# Patient Record
Sex: MALE | Race: WHITE | Employment: OTHER | URBAN - METROPOLITAN AREA
[De-identification: names, ages, dates, MRNs, and addresses within clinical notes are randomized per-mention and may not be internally consistent; named-entity substitution may affect disease eponyms.]

---

## 2020-01-01 ENCOUNTER — APPOINTMENT (OUTPATIENT)
Dept: GENERAL RADIOLOGY | Age: 70
DRG: 207 | End: 2020-01-01
Attending: INTERNAL MEDICINE
Payer: MEDICARE

## 2020-01-01 ENCOUNTER — APPOINTMENT (OUTPATIENT)
Dept: ULTRASOUND IMAGING | Age: 70
DRG: 207 | End: 2020-01-01
Attending: INTERNAL MEDICINE
Payer: MEDICARE

## 2020-01-01 ENCOUNTER — APPOINTMENT (OUTPATIENT)
Dept: CT IMAGING | Age: 70
End: 2020-01-01
Attending: EMERGENCY MEDICINE
Payer: COMMERCIAL

## 2020-01-01 ENCOUNTER — APPOINTMENT (OUTPATIENT)
Dept: GENERAL RADIOLOGY | Age: 70
DRG: 207 | End: 2020-01-01
Attending: EMERGENCY MEDICINE
Payer: MEDICARE

## 2020-01-01 ENCOUNTER — HOSPITAL ENCOUNTER (INPATIENT)
Age: 70
LOS: 13 days | DRG: 207 | End: 2020-12-15
Attending: EMERGENCY MEDICINE | Admitting: INTERNAL MEDICINE
Payer: MEDICARE

## 2020-01-01 ENCOUNTER — HOSPITAL ENCOUNTER (EMERGENCY)
Age: 70
Discharge: HOME | End: 2020-11-27
Attending: EMERGENCY MEDICINE
Payer: COMMERCIAL

## 2020-01-01 VITALS
TEMPERATURE: 99.6 F | BODY MASS INDEX: 29.12 KG/M2 | DIASTOLIC BLOOD PRESSURE: 81 MMHG | HEIGHT: 72 IN | SYSTOLIC BLOOD PRESSURE: 154 MMHG | OXYGEN SATURATION: 94 % | RESPIRATION RATE: 18 BRPM | HEART RATE: 78 BPM | WEIGHT: 215 LBS

## 2020-01-01 VITALS
OXYGEN SATURATION: 23 % | TEMPERATURE: 102.1 F | DIASTOLIC BLOOD PRESSURE: 10 MMHG | BODY MASS INDEX: 29.5 KG/M2 | SYSTOLIC BLOOD PRESSURE: 26 MMHG | WEIGHT: 217.81 LBS | RESPIRATION RATE: 125 BRPM | HEIGHT: 72 IN

## 2020-01-01 DIAGNOSIS — U07.1 ACUTE RESPIRATORY DISTRESS SYNDROME (ARDS) DUE TO COVID-19 VIRUS (HCC): ICD-10-CM

## 2020-01-01 DIAGNOSIS — E87.0 HYPERNATREMIA: ICD-10-CM

## 2020-01-01 DIAGNOSIS — J98.2 PNEUMOMEDIASTINUM (HCC): ICD-10-CM

## 2020-01-01 DIAGNOSIS — N17.9 AKI (ACUTE KIDNEY INJURY) (HCC): ICD-10-CM

## 2020-01-01 DIAGNOSIS — J96.01 ACUTE HYPOXEMIC RESPIRATORY FAILURE DUE TO COVID-19 (HCC): ICD-10-CM

## 2020-01-01 DIAGNOSIS — D69.6 THROMBOCYTOPENIA (HCC): ICD-10-CM

## 2020-01-01 DIAGNOSIS — U07.1 COVID-19: ICD-10-CM

## 2020-01-01 DIAGNOSIS — R91.8 MASS OF LEFT LUNG: ICD-10-CM

## 2020-01-01 DIAGNOSIS — T79.7XXD SUBCUTANEOUS EMPHYSEMA, SUBSEQUENT ENCOUNTER: ICD-10-CM

## 2020-01-01 DIAGNOSIS — J80 ACUTE RESPIRATORY DISTRESS SYNDROME (ARDS) DUE TO COVID-19 VIRUS (HCC): ICD-10-CM

## 2020-01-01 DIAGNOSIS — J93.9 PNEUMOTHORAX ON LEFT: ICD-10-CM

## 2020-01-01 DIAGNOSIS — J12.82 PNEUMONIA DUE TO COVID-19 VIRUS: ICD-10-CM

## 2020-01-01 DIAGNOSIS — R26.9 ABNORMAL GAIT: Primary | ICD-10-CM

## 2020-01-01 DIAGNOSIS — U07.1 PNEUMONIA DUE TO COVID-19 VIRUS: ICD-10-CM

## 2020-01-01 DIAGNOSIS — R41.0 DELIRIUM: ICD-10-CM

## 2020-01-01 DIAGNOSIS — J96.01 ACUTE RESPIRATORY FAILURE WITH HYPOXIA (HCC): Primary | ICD-10-CM

## 2020-01-01 DIAGNOSIS — J93.9 PNEUMOTHORAX ON RIGHT: ICD-10-CM

## 2020-01-01 DIAGNOSIS — J90 PLEURAL EFFUSION, LEFT: ICD-10-CM

## 2020-01-01 DIAGNOSIS — U07.1 ACUTE HYPOXEMIC RESPIRATORY FAILURE DUE TO COVID-19 (HCC): ICD-10-CM

## 2020-01-01 LAB
ABO + RH BLD: NORMAL
ALBUMIN SERPL-MCNC: 1.3 G/DL (ref 3.2–4.6)
ALBUMIN SERPL-MCNC: 1.6 G/DL (ref 3.2–4.6)
ALBUMIN SERPL-MCNC: 2 G/DL (ref 3.2–4.6)
ALBUMIN SERPL-MCNC: 2.8 G/DL (ref 3.2–4.6)
ALBUMIN SERPL-MCNC: 3.6 G/DL (ref 3.2–4.6)
ALBUMIN/GLOB SERPL: 0.4 {RATIO} (ref 1.2–3.5)
ALBUMIN/GLOB SERPL: 0.7 {RATIO} (ref 1.2–3.5)
ALBUMIN/GLOB SERPL: 0.9 {RATIO} (ref 1.2–3.5)
ALP SERPL-CCNC: 57 U/L (ref 50–136)
ALP SERPL-CCNC: 61 U/L (ref 50–136)
ALP SERPL-CCNC: 77 U/L (ref 50–136)
ALT SERPL-CCNC: 19 U/L (ref 12–65)
ALT SERPL-CCNC: 28 U/L (ref 12–65)
ALT SERPL-CCNC: 30 U/L (ref 12–65)
AMORPH CRY URNS QL MICRO: ABNORMAL
ANION GAP SERPL CALC-SCNC: 11 MMOL/L (ref 7–16)
ANION GAP SERPL CALC-SCNC: 2 MMOL/L (ref 7–16)
ANION GAP SERPL CALC-SCNC: 5 MMOL/L (ref 7–16)
ANION GAP SERPL CALC-SCNC: 6 MMOL/L (ref 7–16)
ANION GAP SERPL CALC-SCNC: 7 MMOL/L (ref 7–16)
ANION GAP SERPL CALC-SCNC: 8 MMOL/L (ref 7–16)
ANION GAP SERPL CALC-SCNC: 9 MMOL/L (ref 7–16)
APPEARANCE UR: ABNORMAL
APTT PPP: 32.7 SEC (ref 24.1–35.1)
APTT PPP: 59.2 SEC (ref 24.1–35.1)
APTT PPP: 64.6 SEC (ref 24.1–35.1)
APTT PPP: 65.9 SEC (ref 24.1–35.1)
APTT PPP: 69.5 SEC (ref 24.1–35.1)
APTT PPP: 77.8 SEC (ref 24.1–35.1)
ARTERIAL PATENCY WRIST A: ABNORMAL
ARTERIAL PATENCY WRIST A: YES
AST SERPL-CCNC: 23 U/L (ref 15–37)
AST SERPL-CCNC: 53 U/L (ref 15–37)
AST SERPL-CCNC: 75 U/L (ref 15–37)
ATRIAL RATE: 66 BPM
BACTERIA SPEC CULT: NORMAL
BACTERIA SPEC CULT: NORMAL
BACTERIA URNS QL MICRO: 0 /HPF
BACTERIA URNS QL MICRO: ABNORMAL /HPF
BASE DEFICIT BLD-SCNC: 1 MMOL/L
BASE DEFICIT BLD-SCNC: 2 MMOL/L
BASE DEFICIT BLD-SCNC: 3 MMOL/L
BASE DEFICIT BLD-SCNC: 4 MMOL/L
BASE DEFICIT BLD-SCNC: 4 MMOL/L
BASE DEFICIT BLD-SCNC: 6 MMOL/L
BASE DEFICIT BLD-SCNC: 7 MMOL/L
BASE DEFICIT BLD-SCNC: 8 MMOL/L
BASE DEFICIT BLD-SCNC: 8 MMOL/L
BASE EXCESS BLD CALC-SCNC: 1 MMOL/L
BASOPHILS # BLD: 0 K/UL (ref 0–0.2)
BASOPHILS NFR BLD: 0 % (ref 0–2)
BASOPHILS NFR BLD: 1 % (ref 0–2)
BDY SITE: ABNORMAL
BILIRUB DIRECT SERPL-MCNC: 0.4 MG/DL
BILIRUB SERPL-MCNC: 0.5 MG/DL (ref 0.2–1.1)
BILIRUB SERPL-MCNC: 0.6 MG/DL (ref 0.2–1.1)
BILIRUB SERPL-MCNC: 0.6 MG/DL (ref 0.2–1.1)
BILIRUB UR QL: NEGATIVE
BLD PROD TYP BPU: NORMAL
BLOOD GROUP ANTIBODIES SERPL: NORMAL
BNP SERPL-MCNC: 525 PG/ML (ref 5–125)
BPU ID: NORMAL
BUN SERPL-MCNC: 110 MG/DL (ref 8–23)
BUN SERPL-MCNC: 120 MG/DL (ref 8–23)
BUN SERPL-MCNC: 142 MG/DL (ref 8–23)
BUN SERPL-MCNC: 24 MG/DL (ref 8–23)
BUN SERPL-MCNC: 28 MG/DL (ref 8–23)
BUN SERPL-MCNC: 28 MG/DL (ref 8–23)
BUN SERPL-MCNC: 30 MG/DL (ref 8–23)
BUN SERPL-MCNC: 32 MG/DL (ref 8–23)
BUN SERPL-MCNC: 33 MG/DL (ref 8–23)
BUN SERPL-MCNC: 36 MG/DL (ref 8–23)
BUN SERPL-MCNC: 37 MG/DL (ref 8–23)
BUN SERPL-MCNC: 64 MG/DL (ref 8–23)
BUN SERPL-MCNC: 73 MG/DL (ref 8–23)
BUN SERPL-MCNC: 85 MG/DL (ref 8–23)
BUN SERPL-MCNC: 88 MG/DL (ref 8–23)
BUN SERPL-MCNC: 98 MG/DL (ref 8–23)
CALCIUM SERPL-MCNC: 6.1 MG/DL (ref 8.3–10.4)
CALCIUM SERPL-MCNC: 7.1 MG/DL (ref 8.3–10.4)
CALCIUM SERPL-MCNC: 7.7 MG/DL (ref 8.3–10.4)
CALCIUM SERPL-MCNC: 7.7 MG/DL (ref 8.3–10.4)
CALCIUM SERPL-MCNC: 7.8 MG/DL (ref 8.3–10.4)
CALCIUM SERPL-MCNC: 7.9 MG/DL (ref 8.3–10.4)
CALCIUM SERPL-MCNC: 8 MG/DL (ref 8.3–10.4)
CALCIUM SERPL-MCNC: 8 MG/DL (ref 8.3–10.4)
CALCIUM SERPL-MCNC: 8.2 MG/DL (ref 8.3–10.4)
CALCIUM SERPL-MCNC: 8.2 MG/DL (ref 8.3–10.4)
CALCIUM SERPL-MCNC: 8.3 MG/DL (ref 8.3–10.4)
CALCIUM SERPL-MCNC: 8.4 MG/DL (ref 8.3–10.4)
CALCIUM SERPL-MCNC: 8.4 MG/DL (ref 8.3–10.4)
CALCIUM SERPL-MCNC: 8.6 MG/DL (ref 8.3–10.4)
CALCULATED P AXIS, ECG09: 57 DEGREES
CALCULATED R AXIS, ECG10: 53 DEGREES
CALCULATED T AXIS, ECG11: 58 DEGREES
CASTS URNS QL MICRO: ABNORMAL /LPF
CASTS URNS QL MICRO: NORMAL /LPF
CHLORIDE SERPL-SCNC: 100 MMOL/L (ref 98–107)
CHLORIDE SERPL-SCNC: 103 MMOL/L (ref 98–107)
CHLORIDE SERPL-SCNC: 105 MMOL/L (ref 98–107)
CHLORIDE SERPL-SCNC: 107 MMOL/L (ref 98–107)
CHLORIDE SERPL-SCNC: 111 MMOL/L (ref 98–107)
CHLORIDE SERPL-SCNC: 115 MMOL/L (ref 98–107)
CHLORIDE SERPL-SCNC: 117 MMOL/L (ref 98–107)
CHLORIDE SERPL-SCNC: 120 MMOL/L (ref 98–107)
CHLORIDE SERPL-SCNC: 122 MMOL/L (ref 98–107)
CHLORIDE SERPL-SCNC: 123 MMOL/L (ref 98–107)
CHLORIDE SERPL-SCNC: 123 MMOL/L (ref 98–107)
CHLORIDE SERPL-SCNC: 125 MMOL/L (ref 98–107)
CHLORIDE SERPL-SCNC: 126 MMOL/L (ref 98–107)
CHLORIDE SERPL-SCNC: 97 MMOL/L (ref 98–107)
CO2 BLD-SCNC: 20 MMOL/L
CO2 BLD-SCNC: 20 MMOL/L
CO2 BLD-SCNC: 21 MMOL/L
CO2 BLD-SCNC: 22 MMOL/L
CO2 BLD-SCNC: 23 MMOL/L
CO2 BLD-SCNC: 24 MMOL/L
CO2 BLD-SCNC: 24 MMOL/L
CO2 BLD-SCNC: 25 MMOL/L
CO2 BLD-SCNC: 26 MMOL/L
CO2 BLD-SCNC: 27 MMOL/L
CO2 BLD-SCNC: 27 MMOL/L
CO2 BLD-SCNC: 29 MMOL/L
CO2 BLD-SCNC: 29 MMOL/L
CO2 BLD-SCNC: 30 MMOL/L
CO2 BLD-SCNC: 31 MMOL/L
CO2 BLD-SCNC: 32 MMOL/L
CO2 BLD-SCNC: 32 MMOL/L
CO2 SERPL-SCNC: 20 MMOL/L (ref 21–32)
CO2 SERPL-SCNC: 21 MMOL/L (ref 21–32)
CO2 SERPL-SCNC: 23 MMOL/L (ref 21–32)
CO2 SERPL-SCNC: 23 MMOL/L (ref 21–32)
CO2 SERPL-SCNC: 24 MMOL/L (ref 21–32)
CO2 SERPL-SCNC: 25 MMOL/L (ref 21–32)
CO2 SERPL-SCNC: 28 MMOL/L (ref 21–32)
CO2 SERPL-SCNC: 29 MMOL/L (ref 21–32)
CO2 SERPL-SCNC: 29 MMOL/L (ref 21–32)
CO2 SERPL-SCNC: 31 MMOL/L (ref 21–32)
COLLECT TIME,HTIME: 1045
COLLECT TIME,HTIME: 1045
COLLECT TIME,HTIME: 1109
COLLECT TIME,HTIME: 1203
COLLECT TIME,HTIME: 1210
COLLECT TIME,HTIME: 140
COLLECT TIME,HTIME: 145
COLLECT TIME,HTIME: 1458
COLLECT TIME,HTIME: 200
COLLECT TIME,HTIME: 2004
COLLECT TIME,HTIME: 210
COLLECT TIME,HTIME: 233
COLLECT TIME,HTIME: 250
COLLECT TIME,HTIME: 250
COLLECT TIME,HTIME: 305
COLLECT TIME,HTIME: 306
COLLECT TIME,HTIME: 320
COLLECT TIME,HTIME: 340
COLLECT TIME,HTIME: 345
COLLECT TIME,HTIME: 400
COLLECT TIME,HTIME: 400
COLLECT TIME,HTIME: 405
COLLECT TIME,HTIME: 410
COLOR UR: YELLOW
COVID-19 RAPID TEST, COVR: DETECTED
CREAT SERPL-MCNC: 0.78 MG/DL (ref 0.8–1.5)
CREAT SERPL-MCNC: 0.93 MG/DL (ref 0.8–1.5)
CREAT SERPL-MCNC: 0.94 MG/DL (ref 0.8–1.5)
CREAT SERPL-MCNC: 0.94 MG/DL (ref 0.8–1.5)
CREAT SERPL-MCNC: 0.97 MG/DL (ref 0.8–1.5)
CREAT SERPL-MCNC: 1.03 MG/DL (ref 0.8–1.5)
CREAT SERPL-MCNC: 1.39 MG/DL (ref 0.8–1.5)
CREAT SERPL-MCNC: 2.23 MG/DL (ref 0.8–1.5)
CREAT SERPL-MCNC: 2.45 MG/DL (ref 0.8–1.5)
CREAT SERPL-MCNC: 2.49 MG/DL (ref 0.8–1.5)
CREAT SERPL-MCNC: 2.73 MG/DL (ref 0.8–1.5)
CREAT SERPL-MCNC: 3.06 MG/DL (ref 0.8–1.5)
CREAT SERPL-MCNC: 3.64 MG/DL (ref 0.8–1.5)
CREAT SERPL-MCNC: 3.7 MG/DL (ref 0.8–1.5)
CREAT SERPL-MCNC: 3.99 MG/DL (ref 0.8–1.5)
CREAT SERPL-MCNC: 4.41 MG/DL (ref 0.8–1.5)
CREAT UR-MCNC: 66 MG/DL
D DIMER PPP FEU-MCNC: 6.14 UG/ML(FEU)
DIAGNOSIS, 93000: NORMAL
DIFFERENTIAL METHOD BLD: ABNORMAL
EOSINOPHIL # BLD: 0 K/UL (ref 0–0.8)
EOSINOPHIL # BLD: 0.2 K/UL (ref 0–0.8)
EOSINOPHIL NFR BLD: 0 % (ref 0.5–7.8)
EOSINOPHIL NFR BLD: 1 % (ref 0.5–7.8)
EPI CELLS #/AREA URNS HPF: ABNORMAL /HPF
EPI CELLS #/AREA URNS HPF: NORMAL /HPF
ERYTHROCYTE [DISTWIDTH] IN BLOOD BY AUTOMATED COUNT: 14 % (ref 11.9–14.6)
ERYTHROCYTE [DISTWIDTH] IN BLOOD BY AUTOMATED COUNT: 14.3 % (ref 11.9–14.6)
ERYTHROCYTE [DISTWIDTH] IN BLOOD BY AUTOMATED COUNT: 14.4 % (ref 11.9–14.6)
ERYTHROCYTE [DISTWIDTH] IN BLOOD BY AUTOMATED COUNT: 14.6 % (ref 11.9–14.6)
ERYTHROCYTE [DISTWIDTH] IN BLOOD BY AUTOMATED COUNT: 14.6 % (ref 11.9–14.6)
ERYTHROCYTE [DISTWIDTH] IN BLOOD BY AUTOMATED COUNT: 15.3 % (ref 11.9–14.6)
ERYTHROCYTE [DISTWIDTH] IN BLOOD BY AUTOMATED COUNT: 15.5 % (ref 11.9–14.6)
ERYTHROCYTE [DISTWIDTH] IN BLOOD BY AUTOMATED COUNT: 15.5 % (ref 11.9–14.6)
ERYTHROCYTE [DISTWIDTH] IN BLOOD BY AUTOMATED COUNT: 15.6 % (ref 11.9–14.6)
ERYTHROCYTE [DISTWIDTH] IN BLOOD BY AUTOMATED COUNT: 15.8 % (ref 11.9–14.6)
ERYTHROCYTE [DISTWIDTH] IN BLOOD BY AUTOMATED COUNT: 17 % (ref 11.9–14.6)
ERYTHROCYTE [DISTWIDTH] IN BLOOD BY AUTOMATED COUNT: 17.3 % (ref 11.9–14.6)
ERYTHROCYTE [DISTWIDTH] IN BLOOD BY AUTOMATED COUNT: 17.5 % (ref 11.9–14.6)
ERYTHROCYTE [DISTWIDTH] IN BLOOD BY AUTOMATED COUNT: 17.5 % (ref 11.9–14.6)
EXHALED MINUTE VOLUME, VE: 12.8 L/MIN
EXHALED MINUTE VOLUME, VE: 12.9 L/MIN
EXHALED MINUTE VOLUME, VE: 13.6 L/MIN
EXHALED MINUTE VOLUME, VE: 14 L/MIN
EXHALED MINUTE VOLUME, VE: 15 L/MIN
EXHALED MINUTE VOLUME, VE: 15.5 L/MIN
EXHALED MINUTE VOLUME, VE: 15.7 L/MIN
EXHALED MINUTE VOLUME, VE: 16.3 L/MIN
EXHALED MINUTE VOLUME, VE: 16.5 L/MIN
EXHALED MINUTE VOLUME, VE: 17 L/MIN
EXHALED MINUTE VOLUME, VE: 17 L/MIN
EXHALED MINUTE VOLUME, VE: 17.3 L/MIN
EXHALED MINUTE VOLUME, VE: 17.7 L/MIN
EXHALED MINUTE VOLUME, VE: 20.8 L/MIN
EXHALED MINUTE VOLUME, VE: 21.2 L/MIN
EXHALED MINUTE VOLUME, VE: 28.2 L/MIN
EXHALED MINUTE VOLUME, VE: 30 L/MIN
EXHALED MINUTE VOLUME, VE: 35 L/MIN
FLOW RATE ISTAT,IFRATE: 15 L/MIN
GAS FLOW.O2 O2 DELIVERY SYS: ABNORMAL L/MIN
GAS FLOW.O2 SETTING OXYMISER: 12 BPM
GAS FLOW.O2 SETTING OXYMISER: 14 BPM
GAS FLOW.O2 SETTING OXYMISER: 24 BPM
GAS FLOW.O2 SETTING OXYMISER: 25 BPM
GAS FLOW.O2 SETTING OXYMISER: 26 BPM
GAS FLOW.O2 SETTING OXYMISER: 28 BPM
GAS FLOW.O2 SETTING OXYMISER: 30 BPM
GAS FLOW.O2 SETTING OXYMISER: 30 BPM
GAS FLOW.O2 SETTING OXYMISER: 31 BPM
GAS FLOW.O2 SETTING OXYMISER: 32 BPM
GLOBULIN SER CALC-MCNC: 4 G/DL (ref 2.3–3.5)
GLOBULIN SER CALC-MCNC: 4.2 G/DL (ref 2.3–3.5)
GLOBULIN SER CALC-MCNC: 4.4 G/DL (ref 2.3–3.5)
GLUCOSE BLD STRIP.AUTO-MCNC: 101 MG/DL (ref 65–100)
GLUCOSE BLD STRIP.AUTO-MCNC: 104 MG/DL (ref 65–100)
GLUCOSE BLD STRIP.AUTO-MCNC: 105 MG/DL (ref 65–100)
GLUCOSE BLD STRIP.AUTO-MCNC: 106 MG/DL (ref 65–100)
GLUCOSE BLD STRIP.AUTO-MCNC: 108 MG/DL (ref 65–100)
GLUCOSE BLD STRIP.AUTO-MCNC: 110 MG/DL (ref 65–100)
GLUCOSE BLD STRIP.AUTO-MCNC: 110 MG/DL (ref 65–100)
GLUCOSE BLD STRIP.AUTO-MCNC: 111 MG/DL (ref 65–100)
GLUCOSE BLD STRIP.AUTO-MCNC: 112 MG/DL (ref 65–100)
GLUCOSE BLD STRIP.AUTO-MCNC: 113 MG/DL (ref 65–100)
GLUCOSE BLD STRIP.AUTO-MCNC: 116 MG/DL (ref 65–100)
GLUCOSE BLD STRIP.AUTO-MCNC: 118 MG/DL (ref 65–100)
GLUCOSE BLD STRIP.AUTO-MCNC: 122 MG/DL (ref 65–100)
GLUCOSE BLD STRIP.AUTO-MCNC: 124 MG/DL (ref 65–100)
GLUCOSE BLD STRIP.AUTO-MCNC: 125 MG/DL (ref 65–100)
GLUCOSE BLD STRIP.AUTO-MCNC: 126 MG/DL (ref 65–100)
GLUCOSE BLD STRIP.AUTO-MCNC: 126 MG/DL (ref 65–100)
GLUCOSE BLD STRIP.AUTO-MCNC: 128 MG/DL (ref 65–100)
GLUCOSE BLD STRIP.AUTO-MCNC: 128 MG/DL (ref 65–100)
GLUCOSE BLD STRIP.AUTO-MCNC: 129 MG/DL (ref 65–100)
GLUCOSE BLD STRIP.AUTO-MCNC: 133 MG/DL (ref 65–100)
GLUCOSE BLD STRIP.AUTO-MCNC: 134 MG/DL (ref 65–100)
GLUCOSE BLD STRIP.AUTO-MCNC: 134 MG/DL (ref 65–100)
GLUCOSE BLD STRIP.AUTO-MCNC: 135 MG/DL (ref 65–100)
GLUCOSE BLD STRIP.AUTO-MCNC: 137 MG/DL (ref 65–100)
GLUCOSE BLD STRIP.AUTO-MCNC: 138 MG/DL (ref 65–100)
GLUCOSE BLD STRIP.AUTO-MCNC: 139 MG/DL (ref 65–100)
GLUCOSE BLD STRIP.AUTO-MCNC: 139 MG/DL (ref 65–100)
GLUCOSE BLD STRIP.AUTO-MCNC: 143 MG/DL (ref 65–100)
GLUCOSE BLD STRIP.AUTO-MCNC: 143 MG/DL (ref 65–100)
GLUCOSE BLD STRIP.AUTO-MCNC: 144 MG/DL (ref 65–100)
GLUCOSE BLD STRIP.AUTO-MCNC: 145 MG/DL (ref 65–100)
GLUCOSE BLD STRIP.AUTO-MCNC: 149 MG/DL (ref 65–100)
GLUCOSE BLD STRIP.AUTO-MCNC: 150 MG/DL (ref 65–100)
GLUCOSE BLD STRIP.AUTO-MCNC: 151 MG/DL (ref 65–100)
GLUCOSE BLD STRIP.AUTO-MCNC: 154 MG/DL (ref 65–100)
GLUCOSE BLD STRIP.AUTO-MCNC: 156 MG/DL (ref 65–100)
GLUCOSE BLD STRIP.AUTO-MCNC: 157 MG/DL (ref 65–100)
GLUCOSE BLD STRIP.AUTO-MCNC: 159 MG/DL (ref 65–100)
GLUCOSE BLD STRIP.AUTO-MCNC: 159 MG/DL (ref 65–100)
GLUCOSE BLD STRIP.AUTO-MCNC: 196 MG/DL (ref 65–100)
GLUCOSE BLD STRIP.AUTO-MCNC: 196 MG/DL (ref 65–100)
GLUCOSE BLD STRIP.AUTO-MCNC: 200 MG/DL (ref 65–100)
GLUCOSE BLD STRIP.AUTO-MCNC: 21 MG/DL (ref 65–100)
GLUCOSE BLD STRIP.AUTO-MCNC: 24 MG/DL (ref 65–100)
GLUCOSE BLD STRIP.AUTO-MCNC: 76 MG/DL (ref 65–100)
GLUCOSE BLD STRIP.AUTO-MCNC: 78 MG/DL (ref 65–100)
GLUCOSE BLD STRIP.AUTO-MCNC: 92 MG/DL (ref 65–100)
GLUCOSE BLD STRIP.AUTO-MCNC: 95 MG/DL (ref 65–100)
GLUCOSE BLD STRIP.AUTO-MCNC: 95 MG/DL (ref 65–100)
GLUCOSE BLD STRIP.AUTO-MCNC: 99 MG/DL (ref 65–100)
GLUCOSE SERPL-MCNC: 106 MG/DL (ref 65–100)
GLUCOSE SERPL-MCNC: 107 MG/DL (ref 65–100)
GLUCOSE SERPL-MCNC: 109 MG/DL (ref 65–100)
GLUCOSE SERPL-MCNC: 116 MG/DL (ref 65–100)
GLUCOSE SERPL-MCNC: 116 MG/DL (ref 65–100)
GLUCOSE SERPL-MCNC: 118 MG/DL (ref 65–100)
GLUCOSE SERPL-MCNC: 133 MG/DL (ref 65–100)
GLUCOSE SERPL-MCNC: 143 MG/DL (ref 65–100)
GLUCOSE SERPL-MCNC: 147 MG/DL (ref 65–100)
GLUCOSE SERPL-MCNC: 154 MG/DL (ref 65–100)
GLUCOSE SERPL-MCNC: 156 MG/DL (ref 65–100)
GLUCOSE SERPL-MCNC: 178 MG/DL (ref 65–100)
GLUCOSE SERPL-MCNC: 686 MG/DL (ref 65–100)
GLUCOSE SERPL-MCNC: 90 MG/DL (ref 65–100)
GLUCOSE SERPL-MCNC: 917 MG/DL (ref 65–100)
GLUCOSE SERPL-MCNC: 95 MG/DL (ref 65–100)
GLUCOSE UR STRIP.AUTO-MCNC: NEGATIVE MG/DL
HAV IGM SER QL: NONREACTIVE
HBV CORE IGM SER QL: NONREACTIVE
HBV SURFACE AG SER QL: NONREACTIVE
HCO3 BLD-SCNC: 18.5 MMOL/L (ref 22–26)
HCO3 BLD-SCNC: 18.6 MMOL/L (ref 22–26)
HCO3 BLD-SCNC: 20 MMOL/L (ref 22–26)
HCO3 BLD-SCNC: 20.9 MMOL/L (ref 22–26)
HCO3 BLD-SCNC: 21.4 MMOL/L (ref 22–26)
HCO3 BLD-SCNC: 21.6 MMOL/L (ref 22–26)
HCO3 BLD-SCNC: 21.7 MMOL/L (ref 22–26)
HCO3 BLD-SCNC: 22.3 MMOL/L (ref 22–26)
HCO3 BLD-SCNC: 22.4 MMOL/L (ref 22–26)
HCO3 BLD-SCNC: 22.5 MMOL/L (ref 22–26)
HCO3 BLD-SCNC: 23.4 MMOL/L (ref 22–26)
HCO3 BLD-SCNC: 23.5 MMOL/L (ref 22–26)
HCO3 BLD-SCNC: 23.6 MMOL/L (ref 22–26)
HCO3 BLD-SCNC: 24 MMOL/L (ref 22–26)
HCO3 BLD-SCNC: 24.4 MMOL/L (ref 22–26)
HCO3 BLD-SCNC: 25.2 MMOL/L (ref 22–26)
HCO3 BLD-SCNC: 25.5 MMOL/L (ref 22–26)
HCO3 BLD-SCNC: 26.7 MMOL/L (ref 22–26)
HCO3 BLD-SCNC: 27.3 MMOL/L (ref 22–26)
HCO3 BLD-SCNC: 27.4 MMOL/L (ref 22–26)
HCO3 BLD-SCNC: 29.2 MMOL/L (ref 22–26)
HCO3 BLD-SCNC: 29.2 MMOL/L (ref 22–26)
HCO3 BLD-SCNC: 29.6 MMOL/L (ref 22–26)
HCT VFR BLD AUTO: 22.5 % (ref 41.1–50.3)
HCT VFR BLD AUTO: 24.3 % (ref 41.1–50.3)
HCT VFR BLD AUTO: 26.4 % (ref 41.1–50.3)
HCT VFR BLD AUTO: 27.2 % (ref 41.1–50.3)
HCT VFR BLD AUTO: 29.9 % (ref 41.1–50.3)
HCT VFR BLD AUTO: 30.9 % (ref 41.1–50.3)
HCT VFR BLD AUTO: 33.4 % (ref 41.1–50.3)
HCT VFR BLD AUTO: 33.6 % (ref 41.1–50.3)
HCT VFR BLD AUTO: 34.1 % (ref 41.1–50.3)
HCT VFR BLD AUTO: 34.1 % (ref 41.1–50.3)
HCT VFR BLD AUTO: 34.4 % (ref 41.1–50.3)
HCT VFR BLD AUTO: 35.9 % (ref 41.1–50.3)
HCT VFR BLD AUTO: 39.1 % (ref 41.1–50.3)
HCT VFR BLD AUTO: 39.8 % (ref 41.1–50.3)
HCT VFR BLD AUTO: 41 % (ref 41.1–50.3)
HCT VFR BLD AUTO: 42.4 % (ref 41.1–50.3)
HCV AB SER QL: NONREACTIVE
HEMOCCULT STL QL: POSITIVE
HEPARIN INDUCED PLT,XHIPA: NEGATIVE
HGB BLD-MCNC: 10.5 G/DL (ref 13.6–17.2)
HGB BLD-MCNC: 10.6 G/DL (ref 13.6–17.2)
HGB BLD-MCNC: 10.8 G/DL (ref 13.6–17.2)
HGB BLD-MCNC: 10.9 G/DL (ref 13.6–17.2)
HGB BLD-MCNC: 11 G/DL (ref 13.6–17.2)
HGB BLD-MCNC: 11.4 G/DL (ref 13.6–17.2)
HGB BLD-MCNC: 12 G/DL (ref 13.6–17.2)
HGB BLD-MCNC: 12.6 G/DL (ref 13.6–17.2)
HGB BLD-MCNC: 13.1 G/DL (ref 13.6–17.2)
HGB BLD-MCNC: 13.6 G/DL (ref 13.6–17.2)
HGB BLD-MCNC: 13.9 G/DL (ref 13.6–17.2)
HGB BLD-MCNC: 7.1 G/DL (ref 13.6–17.2)
HGB BLD-MCNC: 7.4 G/DL (ref 13.6–17.2)
HGB BLD-MCNC: 8.1 G/DL (ref 13.6–17.2)
HGB BLD-MCNC: 8.7 G/DL (ref 13.6–17.2)
HGB BLD-MCNC: 9.9 G/DL (ref 13.6–17.2)
HGB UR QL STRIP: ABNORMAL
HISTORY CHECKED?,CKHIST: NORMAL
HIT INTERPRETATION,XINTPR: NEGATIVE
HIT PROFILE,XHITT: NORMAL
IMM GRANULOCYTES # BLD AUTO: 0 K/UL (ref 0–0.5)
IMM GRANULOCYTES # BLD AUTO: 0.1 K/UL (ref 0–0.5)
IMM GRANULOCYTES # BLD AUTO: 0.1 K/UL (ref 0–0.5)
IMM GRANULOCYTES # BLD AUTO: 0.2 K/UL (ref 0–0.5)
IMM GRANULOCYTES # BLD AUTO: 0.3 K/UL (ref 0–0.5)
IMM GRANULOCYTES # BLD AUTO: 0.4 K/UL (ref 0–0.5)
IMM GRANULOCYTES NFR BLD AUTO: 0 % (ref 0–5)
IMM GRANULOCYTES NFR BLD AUTO: 0 % (ref 0–5)
IMM GRANULOCYTES NFR BLD AUTO: 1 % (ref 0–5)
IMM GRANULOCYTES NFR BLD AUTO: 2 % (ref 0–5)
IMM GRANULOCYTES NFR BLD AUTO: 3 % (ref 0–5)
INR PPP: 1.5
INSPIRATION.DURATION SETTING TIME VENT: 0.8 SEC
INSPIRATION.DURATION SETTING TIME VENT: 0.8 SEC
INSPIRATION.DURATION SETTING TIME VENT: 0.9 SEC
INSPIRATION.DURATION SETTING TIME VENT: 1 SEC
INSPIRATION.DURATION SETTING TIME VENT: 1.05 SEC
INSPIRATION.DURATION SETTING TIME VENT: 1.05 SEC
INSPIRATION.DURATION SETTING TIME VENT: 1.1 SEC
INSPIRATION.DURATION SETTING TIME VENT: 1.1 SEC
KETONES UR QL STRIP.AUTO: NEGATIVE MG/DL
LACTATE SERPL-SCNC: 1 MMOL/L (ref 0.4–2)
LACTATE SERPL-SCNC: 1.3 MMOL/L (ref 0.4–2)
LEUKOCYTE ESTERASE UR QL STRIP.AUTO: NEGATIVE
LYMPHOCYTES # BLD: 0.3 K/UL (ref 0.5–4.6)
LYMPHOCYTES # BLD: 0.4 K/UL (ref 0.5–4.6)
LYMPHOCYTES # BLD: 0.5 K/UL (ref 0.5–4.6)
LYMPHOCYTES # BLD: 0.6 K/UL (ref 0.5–4.6)
LYMPHOCYTES # BLD: 0.7 K/UL (ref 0.5–4.6)
LYMPHOCYTES # BLD: 0.8 K/UL (ref 0.5–4.6)
LYMPHOCYTES # BLD: 0.8 K/UL (ref 0.5–4.6)
LYMPHOCYTES # BLD: 0.9 K/UL (ref 0.5–4.6)
LYMPHOCYTES NFR BLD: 10 % (ref 13–44)
LYMPHOCYTES NFR BLD: 12 % (ref 13–44)
LYMPHOCYTES NFR BLD: 18 % (ref 13–44)
LYMPHOCYTES NFR BLD: 2 % (ref 13–44)
LYMPHOCYTES NFR BLD: 3 % (ref 13–44)
LYMPHOCYTES NFR BLD: 3 % (ref 13–44)
LYMPHOCYTES NFR BLD: 4 % (ref 13–44)
LYMPHOCYTES NFR BLD: 6 % (ref 13–44)
LYMPHOCYTES NFR BLD: 7 % (ref 13–44)
MAGNESIUM SERPL-MCNC: 2.1 MG/DL (ref 1.8–2.4)
MAGNESIUM SERPL-MCNC: 2.2 MG/DL (ref 1.8–2.4)
MAGNESIUM SERPL-MCNC: 2.5 MG/DL (ref 1.8–2.4)
MAGNESIUM SERPL-MCNC: 3 MG/DL (ref 1.8–2.4)
MAGNESIUM SERPL-MCNC: 3.1 MG/DL (ref 1.8–2.4)
MAGNESIUM SERPL-MCNC: 3.7 MG/DL (ref 1.8–2.4)
MAGNESIUM SERPL-MCNC: 3.8 MG/DL (ref 1.8–2.4)
MCH RBC QN AUTO: 28.9 PG (ref 26.1–32.9)
MCH RBC QN AUTO: 29.4 PG (ref 26.1–32.9)
MCH RBC QN AUTO: 29.6 PG (ref 26.1–32.9)
MCH RBC QN AUTO: 29.6 PG (ref 26.1–32.9)
MCH RBC QN AUTO: 29.7 PG (ref 26.1–32.9)
MCH RBC QN AUTO: 29.9 PG (ref 26.1–32.9)
MCH RBC QN AUTO: 30 PG (ref 26.1–32.9)
MCH RBC QN AUTO: 30.1 PG (ref 26.1–32.9)
MCH RBC QN AUTO: 30.3 PG (ref 26.1–32.9)
MCH RBC QN AUTO: 30.5 PG (ref 26.1–32.9)
MCH RBC QN AUTO: 31.1 PG (ref 26.1–32.9)
MCH RBC QN AUTO: 32.7 PG (ref 26.1–32.9)
MCH RBC QN AUTO: 34.8 PG (ref 26.1–32.9)
MCH RBC QN AUTO: 36.6 PG (ref 26.1–32.9)
MCHC RBC AUTO-ENTMCNC: 30.5 G/DL (ref 31.4–35)
MCHC RBC AUTO-ENTMCNC: 30.8 G/DL (ref 31.4–35)
MCHC RBC AUTO-ENTMCNC: 31.5 G/DL (ref 31.4–35)
MCHC RBC AUTO-ENTMCNC: 31.7 G/DL (ref 31.4–35)
MCHC RBC AUTO-ENTMCNC: 31.8 G/DL (ref 31.4–35)
MCHC RBC AUTO-ENTMCNC: 32 G/DL (ref 31.4–35)
MCHC RBC AUTO-ENTMCNC: 32 G/DL (ref 31.4–35)
MCHC RBC AUTO-ENTMCNC: 32.2 G/DL (ref 31.4–35)
MCHC RBC AUTO-ENTMCNC: 32.8 G/DL (ref 31.4–35)
MCHC RBC AUTO-ENTMCNC: 32.9 G/DL (ref 31.4–35)
MCHC RBC AUTO-ENTMCNC: 32.9 G/DL (ref 31.4–35)
MCHC RBC AUTO-ENTMCNC: 33.2 G/DL (ref 31.4–35)
MCHC RBC AUTO-ENTMCNC: 34.9 G/DL (ref 31.4–35)
MCHC RBC AUTO-ENTMCNC: 36.5 G/DL (ref 31.4–35)
MCV RBC AUTO: 100.3 FL (ref 79.6–97.8)
MCV RBC AUTO: 89.5 FL (ref 79.6–97.8)
MCV RBC AUTO: 89.8 FL (ref 79.6–97.8)
MCV RBC AUTO: 91.8 FL (ref 79.6–97.8)
MCV RBC AUTO: 93.2 FL (ref 79.6–97.8)
MCV RBC AUTO: 93.8 FL (ref 79.6–97.8)
MCV RBC AUTO: 94.5 FL (ref 79.6–97.8)
MCV RBC AUTO: 94.6 FL (ref 79.6–97.8)
MCV RBC AUTO: 94.9 FL (ref 79.6–97.8)
MCV RBC AUTO: 95 FL (ref 79.6–97.8)
MCV RBC AUTO: 95.1 FL (ref 79.6–97.8)
MCV RBC AUTO: 98.3 FL (ref 79.6–97.8)
MCV RBC AUTO: 99.4 FL (ref 79.6–97.8)
MCV RBC AUTO: 99.7 FL (ref 79.6–97.8)
MONOCYTES # BLD: 0.1 K/UL (ref 0.1–1.3)
MONOCYTES # BLD: 0.2 K/UL (ref 0.1–1.3)
MONOCYTES # BLD: 0.2 K/UL (ref 0.1–1.3)
MONOCYTES # BLD: 0.3 K/UL (ref 0.1–1.3)
MONOCYTES # BLD: 0.4 K/UL (ref 0.1–1.3)
MONOCYTES # BLD: 0.4 K/UL (ref 0.1–1.3)
MONOCYTES # BLD: 0.5 K/UL (ref 0.1–1.3)
MONOCYTES # BLD: 0.6 K/UL (ref 0.1–1.3)
MONOCYTES NFR BLD: 1 % (ref 4–12)
MONOCYTES NFR BLD: 1 % (ref 4–12)
MONOCYTES NFR BLD: 3 % (ref 4–12)
MONOCYTES NFR BLD: 4 % (ref 4–12)
MONOCYTES NFR BLD: 9 % (ref 4–12)
MUCOUS THREADS URNS QL MICRO: ABNORMAL /LPF
NEUTS SEG # BLD: 10.7 K/UL (ref 1.7–8.2)
NEUTS SEG # BLD: 11.3 K/UL (ref 1.7–8.2)
NEUTS SEG # BLD: 11.5 K/UL (ref 1.7–8.2)
NEUTS SEG # BLD: 12.7 K/UL (ref 1.7–8.2)
NEUTS SEG # BLD: 13.1 K/UL (ref 1.7–8.2)
NEUTS SEG # BLD: 14.5 K/UL (ref 1.7–8.2)
NEUTS SEG # BLD: 15.9 K/UL (ref 1.7–8.2)
NEUTS SEG # BLD: 16.6 K/UL (ref 1.7–8.2)
NEUTS SEG # BLD: 16.8 K/UL (ref 1.7–8.2)
NEUTS SEG # BLD: 3.1 K/UL (ref 1.7–8.2)
NEUTS SEG # BLD: 3.8 K/UL (ref 1.7–8.2)
NEUTS SEG # BLD: 6 K/UL (ref 1.7–8.2)
NEUTS SEG # BLD: 8.6 K/UL (ref 1.7–8.2)
NEUTS SEG NFR BLD: 73 % (ref 43–78)
NEUTS SEG NFR BLD: 83 % (ref 43–78)
NEUTS SEG NFR BLD: 85 % (ref 43–78)
NEUTS SEG NFR BLD: 90 % (ref 43–78)
NEUTS SEG NFR BLD: 90 % (ref 43–78)
NEUTS SEG NFR BLD: 91 % (ref 43–78)
NEUTS SEG NFR BLD: 91 % (ref 43–78)
NEUTS SEG NFR BLD: 92 % (ref 43–78)
NEUTS SEG NFR BLD: 93 % (ref 43–78)
NITRITE UR QL STRIP.AUTO: NEGATIVE
NRBC # BLD: 0 K/UL (ref 0–0.2)
NRBC # BLD: 0.02 K/UL (ref 0–0.2)
NRBC # BLD: 0.02 K/UL (ref 0–0.2)
NRBC # BLD: 0.07 K/UL (ref 0–0.2)
O2/TOTAL GAS SETTING VFR VENT: 100 %
O2/TOTAL GAS SETTING VFR VENT: 60 %
O2/TOTAL GAS SETTING VFR VENT: 70 %
O2/TOTAL GAS SETTING VFR VENT: 70 %
O2/TOTAL GAS SETTING VFR VENT: 85 %
O2/TOTAL GAS SETTING VFR VENT: 90 %
O2/TOTAL GAS SETTING VFR VENT: 90 %
OPTICAL DENSITY READ,XHITAO: 0.21 ABS
OTHER OBSERVATIONS,UCOM: ABNORMAL
P-R INTERVAL, ECG05: 130 MS
PCO2 BLD: 31 MMHG (ref 35–45)
PCO2 BLD: 31.6 MMHG (ref 35–45)
PCO2 BLD: 32 MMHG (ref 35–45)
PCO2 BLD: 33.4 MMHG (ref 35–45)
PCO2 BLD: 33.6 MMHG (ref 35–45)
PCO2 BLD: 37.9 MMHG (ref 35–45)
PCO2 BLD: 40.4 MMHG (ref 35–45)
PCO2 BLD: 43.7 MMHG (ref 35–45)
PCO2 BLD: 43.8 MMHG (ref 35–45)
PCO2 BLD: 43.8 MMHG (ref 35–45)
PCO2 BLD: 44 MMHG (ref 35–45)
PCO2 BLD: 45.8 MMHG (ref 35–45)
PCO2 BLD: 46.9 MMHG (ref 35–45)
PCO2 BLD: 54.8 MMHG (ref 35–45)
PCO2 BLD: 56.3 MMHG (ref 35–45)
PCO2 BLD: 56.9 MMHG (ref 35–45)
PCO2 BLD: 58.7 MMHG (ref 35–45)
PCO2 BLD: 59.8 MMHG (ref 35–45)
PCO2 BLD: 66.4 MMHG (ref 35–45)
PCO2 BLD: 69.7 MMHG (ref 35–45)
PCO2 BLD: 86.5 MMHG (ref 35–45)
PCO2 BLD: 91.4 MMHG (ref 35–45)
PCO2 BLD: 94.3 MMHG (ref 35–45)
PEEP RESPIRATORY: 10 CMH2O
PEEP RESPIRATORY: 11 CMH2O
PEEP RESPIRATORY: 12 CMH2O
PEEP RESPIRATORY: 14 CMH2O
PEEP RESPIRATORY: 8 CMH2O
PH BLD: 7.11 [PH] (ref 7.35–7.45)
PH BLD: 7.19 [PH] (ref 7.35–7.45)
PH BLD: 7.21 [PH] (ref 7.35–7.45)
PH BLD: 7.24 [PH] (ref 7.35–7.45)
PH BLD: 7.25 [PH] (ref 7.35–7.45)
PH BLD: 7.25 [PH] (ref 7.35–7.45)
PH BLD: 7.26 [PH] (ref 7.35–7.45)
PH BLD: 7.27 [PH] (ref 7.35–7.45)
PH BLD: 7.33 [PH] (ref 7.35–7.45)
PH BLD: 7.33 [PH] (ref 7.35–7.45)
PH BLD: 7.34 [PH] (ref 7.35–7.45)
PH BLD: 7.35 [PH] (ref 7.35–7.45)
PH BLD: 7.4 [PH] (ref 7.35–7.45)
PH BLD: 7.4 [PH] (ref 7.35–7.45)
PH BLD: 7.44 [PH] (ref 7.35–7.45)
PH BLD: 7.44 [PH] (ref 7.35–7.45)
PH BLD: 7.45 [PH] (ref 7.35–7.45)
PH BLD: 7.46 [PH] (ref 7.35–7.45)
PH UR STRIP: 5 [PH] (ref 5–9)
PHOSPHATE SERPL-MCNC: 2.1 MG/DL (ref 2.3–3.7)
PHOSPHATE SERPL-MCNC: 2.6 MG/DL (ref 2.3–3.7)
PHOSPHATE SERPL-MCNC: 3.5 MG/DL (ref 2.3–3.7)
PHOSPHATE SERPL-MCNC: 4.6 MG/DL (ref 2.3–3.7)
PHOSPHATE SERPL-MCNC: 6.4 MG/DL (ref 2.3–3.7)
PHOSPHATE SERPL-MCNC: 7.1 MG/DL (ref 2.3–3.7)
PHOSPHATE SERPL-MCNC: >9 MG/DL (ref 2.3–3.7)
PIP ISTAT,IPIP: 12
PIP ISTAT,IPIP: 16
PIP ISTAT,IPIP: 16
PIP ISTAT,IPIP: 19
PLATELET # BLD AUTO: 109 K/UL (ref 150–450)
PLATELET # BLD AUTO: 110 K/UL (ref 150–450)
PLATELET # BLD AUTO: 119 K/UL (ref 150–450)
PLATELET # BLD AUTO: 120 K/UL (ref 150–450)
PLATELET # BLD AUTO: 120 K/UL (ref 150–450)
PLATELET # BLD AUTO: 138 K/UL (ref 150–450)
PLATELET # BLD AUTO: 155 K/UL (ref 150–450)
PLATELET # BLD AUTO: 164 K/UL (ref 150–450)
PLATELET # BLD AUTO: 169 K/UL (ref 150–450)
PLATELET # BLD AUTO: 194 K/UL (ref 150–450)
PLATELET # BLD AUTO: 209 K/UL (ref 150–450)
PLATELET # BLD AUTO: 84 K/UL (ref 150–450)
PLATELET # BLD AUTO: 97 K/UL (ref 150–450)
PLATELET # BLD AUTO: 97 K/UL (ref 150–450)
PLATELET COMMENTS,PCOM: ABNORMAL
PMV BLD AUTO: 10.8 FL (ref 9.4–12.3)
PMV BLD AUTO: 10.9 FL (ref 9.4–12.3)
PMV BLD AUTO: 11 FL (ref 9.4–12.3)
PMV BLD AUTO: 11.1 FL (ref 9.4–12.3)
PMV BLD AUTO: 12 FL (ref 9.4–12.3)
PMV BLD AUTO: 12.1 FL (ref 9.4–12.3)
PMV BLD AUTO: 12.4 FL (ref 9.4–12.3)
PMV BLD AUTO: 12.6 FL (ref 9.4–12.3)
PMV BLD AUTO: 12.9 FL (ref 9.4–12.3)
PMV BLD AUTO: 13.3 FL (ref 9.4–12.3)
PO2 BLD: 104 MMHG (ref 75–100)
PO2 BLD: 110 MMHG (ref 75–100)
PO2 BLD: 117 MMHG (ref 75–100)
PO2 BLD: 139 MMHG (ref 75–100)
PO2 BLD: 53 MMHG (ref 75–100)
PO2 BLD: 58 MMHG (ref 75–100)
PO2 BLD: 59 MMHG (ref 75–100)
PO2 BLD: 61 MMHG (ref 75–100)
PO2 BLD: 61 MMHG (ref 75–100)
PO2 BLD: 62 MMHG (ref 75–100)
PO2 BLD: 63 MMHG (ref 75–100)
PO2 BLD: 63 MMHG (ref 75–100)
PO2 BLD: 64 MMHG (ref 75–100)
PO2 BLD: 65 MMHG (ref 75–100)
PO2 BLD: 66 MMHG (ref 75–100)
PO2 BLD: 72 MMHG (ref 75–100)
PO2 BLD: 80 MMHG (ref 75–100)
PO2 BLD: 82 MMHG (ref 75–100)
PO2 BLD: 87 MMHG (ref 75–100)
PO2 BLD: 89 MMHG (ref 75–100)
PO2 BLD: 92 MMHG (ref 75–100)
PO2 BLD: 93 MMHG (ref 75–100)
PO2 BLD: 95 MMHG (ref 75–100)
POTASSIUM SERPL-SCNC: 2.9 MMOL/L (ref 3.5–5.1)
POTASSIUM SERPL-SCNC: 3.5 MMOL/L (ref 3.5–5.1)
POTASSIUM SERPL-SCNC: 4.1 MMOL/L (ref 3.5–5.1)
POTASSIUM SERPL-SCNC: 4.1 MMOL/L (ref 3.5–5.1)
POTASSIUM SERPL-SCNC: 4.3 MMOL/L (ref 3.5–5.1)
POTASSIUM SERPL-SCNC: 4.3 MMOL/L (ref 3.5–5.1)
POTASSIUM SERPL-SCNC: 4.7 MMOL/L (ref 3.5–5.1)
POTASSIUM SERPL-SCNC: 4.8 MMOL/L (ref 3.5–5.1)
POTASSIUM SERPL-SCNC: 4.9 MMOL/L (ref 3.5–5.1)
POTASSIUM SERPL-SCNC: 5.1 MMOL/L (ref 3.5–5.1)
POTASSIUM SERPL-SCNC: 5.1 MMOL/L (ref 3.5–5.1)
POTASSIUM SERPL-SCNC: 5.2 MMOL/L (ref 3.5–5.1)
POTASSIUM SERPL-SCNC: 5.3 MMOL/L (ref 3.5–5.1)
POTASSIUM SERPL-SCNC: 5.5 MMOL/L (ref 3.5–5.1)
POTASSIUM SERPL-SCNC: 5.6 MMOL/L (ref 3.5–5.1)
POTASSIUM SERPL-SCNC: 6.2 MMOL/L (ref 3.5–5.1)
PRESSURE CONTROL, IPC: 16
PRESSURE CONTROL, IPC: 18
PRESSURE CONTROL, IPC: 19
PRESSURE CONTROL, IPC: 20
PROCALCITONIN SERPL-MCNC: 0.28 NG/ML
PROT SERPL-MCNC: 6 G/DL (ref 6.3–8.2)
PROT SERPL-MCNC: 7 G/DL (ref 6.3–8.2)
PROT SERPL-MCNC: 7.6 G/DL (ref 6.3–8.2)
PROT UR STRIP-MCNC: 30 MG/DL
PROT UR-MCNC: 103 MG/DL
PROT/CREAT UR-RTO: 1.6
PROTHROMBIN TIME: 18.6 SEC (ref 12.5–14.7)
Q-T INTERVAL, ECG07: 414 MS
QRS DURATION, ECG06: 90 MS
QTC CALCULATION (BEZET), ECG08: 434 MS
RBC # BLD AUTO: 2.56 M/UL (ref 4.23–5.6)
RBC # BLD AUTO: 2.9 M/UL (ref 4.23–5.6)
RBC # BLD AUTO: 2.98 M/UL (ref 4.23–5.6)
RBC # BLD AUTO: 3.25 M/UL (ref 4.23–5.6)
RBC # BLD AUTO: 3.36 M/UL (ref 4.23–5.6)
RBC # BLD AUTO: 3.45 M/UL (ref 4.23–5.6)
RBC # BLD AUTO: 3.47 M/UL (ref 4.23–5.6)
RBC # BLD AUTO: 3.55 M/UL (ref 4.23–5.6)
RBC # BLD AUTO: 3.59 M/UL (ref 4.23–5.6)
RBC # BLD AUTO: 3.85 M/UL (ref 4.23–5.6)
RBC # BLD AUTO: 4.21 M/UL (ref 4.23–5.6)
RBC # BLD AUTO: 4.26 M/UL (ref 4.23–5.6)
RBC # BLD AUTO: 4.58 M/UL (ref 4.23–5.6)
RBC # BLD AUTO: 4.72 M/UL (ref 4.23–5.6)
RBC #/AREA URNS HPF: ABNORMAL /HPF
RBC #/AREA URNS HPF: NORMAL /HPF
RBC MORPH BLD: ABNORMAL
SAO2 % BLD: 79 % (ref 95–98)
SAO2 % BLD: 80 % (ref 95–98)
SAO2 % BLD: 81 % (ref 95–98)
SAO2 % BLD: 89 % (ref 95–98)
SAO2 % BLD: 89 % (ref 95–98)
SAO2 % BLD: 90 % (ref 95–98)
SAO2 % BLD: 90 % (ref 95–98)
SAO2 % BLD: 91 % (ref 95–98)
SAO2 % BLD: 91 % (ref 95–98)
SAO2 % BLD: 92 % (ref 95–98)
SAO2 % BLD: 93 % (ref 95–98)
SAO2 % BLD: 94 % (ref 95–98)
SAO2 % BLD: 95 % (ref 95–98)
SAO2 % BLD: 95 % (ref 95–98)
SAO2 % BLD: 96 % (ref 95–98)
SAO2 % BLD: 97 % (ref 95–98)
SAO2 % BLD: 97 % (ref 95–98)
SAO2 % BLD: 98 % (ref 95–98)
SAO2 % BLD: 99 % (ref 95–98)
SERVICE CMNT-IMP: ABNORMAL
SERVICE CMNT-IMP: NORMAL
SERVICE CMNT-IMP: NORMAL
SODIUM SERPL-SCNC: 131 MMOL/L (ref 136–145)
SODIUM SERPL-SCNC: 137 MMOL/L (ref 136–145)
SODIUM SERPL-SCNC: 137 MMOL/L (ref 136–145)
SODIUM SERPL-SCNC: 139 MMOL/L (ref 136–145)
SODIUM SERPL-SCNC: 141 MMOL/L (ref 136–145)
SODIUM SERPL-SCNC: 144 MMOL/L (ref 136–145)
SODIUM SERPL-SCNC: 146 MMOL/L (ref 136–145)
SODIUM SERPL-SCNC: 147 MMOL/L (ref 136–145)
SODIUM SERPL-SCNC: 150 MMOL/L (ref 138–145)
SODIUM SERPL-SCNC: 152 MMOL/L (ref 138–145)
SODIUM SERPL-SCNC: 153 MMOL/L (ref 136–145)
SODIUM SERPL-SCNC: 155 MMOL/L (ref 136–145)
SODIUM SERPL-SCNC: 155 MMOL/L (ref 138–145)
SODIUM SERPL-SCNC: 155 MMOL/L (ref 138–145)
SODIUM SERPL-SCNC: 159 MMOL/L (ref 136–145)
SODIUM UR-SCNC: 54 MMOL/L
SOURCE, COVRS: ABNORMAL
SP GR UR REFRACTOMETRY: 1.01 (ref 1–1.02)
SPECIMEN EXP DATE BLD: NORMAL
SPECIMEN TYPE: ABNORMAL
SPONTANEOUS TIMED, IST: 12
STATUS OF UNIT,%ST: NORMAL
UFH PPP CHRO-ACNC: 0.14 IU/ML (ref 0.3–0.7)
UFH PPP CHRO-ACNC: 0.24 IU/ML (ref 0.3–0.7)
UFH PPP CHRO-ACNC: <0 IU/ML (ref 0.3–0.7)
UNIT DIVISION, %UDIV: NORMAL
UROBILINOGEN UR QL STRIP.AUTO: 0.2 EU/DL (ref 0.2–1)
VENTILATION MODE VENT: ABNORMAL
VENTRICULAR RATE, ECG03: 66 BPM
VT SETTING VENT: 502 ML
VT SETTING VENT: 540 ML
VT SETTING VENT: 620 ML
VT SETTING VENT: 620 ML
VT SETTING VENT: 749 ML
VT SETTING VENT: 840 ML
WBC # BLD AUTO: 10.5 K/UL (ref 4.3–11.1)
WBC # BLD AUTO: 11.9 K/UL (ref 4.3–11.1)
WBC # BLD AUTO: 12.3 K/UL (ref 4.3–11.1)
WBC # BLD AUTO: 12.5 K/UL (ref 4.3–11.1)
WBC # BLD AUTO: 14 K/UL (ref 4.3–11.1)
WBC # BLD AUTO: 14.4 K/UL (ref 4.3–11.1)
WBC # BLD AUTO: 15.6 K/UL (ref 4.3–11.1)
WBC # BLD AUTO: 17.3 K/UL (ref 4.3–11.1)
WBC # BLD AUTO: 17.8 K/UL (ref 4.3–11.1)
WBC # BLD AUTO: 18.1 K/UL (ref 4.3–11.1)
WBC # BLD AUTO: 4.2 K/UL (ref 4.3–11.1)
WBC # BLD AUTO: 4.5 K/UL (ref 4.3–11.1)
WBC # BLD AUTO: 7.3 K/UL (ref 4.3–11.1)
WBC # BLD AUTO: 9.6 K/UL (ref 4.3–11.1)
WBC MORPH BLD: ABNORMAL
WBC URNS QL MICRO: ABNORMAL /HPF
WBC URNS QL MICRO: NORMAL /HPF

## 2020-01-01 PROCEDURE — 80053 COMPREHEN METABOLIC PANEL: CPT

## 2020-01-01 PROCEDURE — 74011250636 HC RX REV CODE- 250/636: Performed by: INTERNAL MEDICINE

## 2020-01-01 PROCEDURE — 90945 DIALYSIS ONE EVALUATION: CPT

## 2020-01-01 PROCEDURE — 85025 COMPLETE CBC W/AUTO DIFF WBC: CPT

## 2020-01-01 PROCEDURE — 36600 WITHDRAWAL OF ARTERIAL BLOOD: CPT

## 2020-01-01 PROCEDURE — 85520 HEPARIN ASSAY: CPT

## 2020-01-01 PROCEDURE — 74011000258 HC RX REV CODE- 258: Performed by: INTERNAL MEDICINE

## 2020-01-01 PROCEDURE — 99291 CRITICAL CARE FIRST HOUR: CPT | Performed by: INTERNAL MEDICINE

## 2020-01-01 PROCEDURE — 82803 BLOOD GASES ANY COMBINATION: CPT

## 2020-01-01 PROCEDURE — 74011636637 HC RX REV CODE- 636/637: Performed by: INTERNAL MEDICINE

## 2020-01-01 PROCEDURE — 71045 X-RAY EXAM CHEST 1 VIEW: CPT

## 2020-01-01 PROCEDURE — 94003 VENT MGMT INPAT SUBQ DAY: CPT

## 2020-01-01 PROCEDURE — 36415 COLL VENOUS BLD VENIPUNCTURE: CPT

## 2020-01-01 PROCEDURE — 85730 THROMBOPLASTIN TIME PARTIAL: CPT

## 2020-01-01 PROCEDURE — 0BH17EZ INSERTION OF ENDOTRACHEAL AIRWAY INTO TRACHEA, VIA NATURAL OR ARTIFICIAL OPENING: ICD-10-PCS | Performed by: INTERNAL MEDICINE

## 2020-01-01 PROCEDURE — 84295 ASSAY OF SERUM SODIUM: CPT

## 2020-01-01 PROCEDURE — C1729 CATH, DRAINAGE: HCPCS

## 2020-01-01 PROCEDURE — 5A09357 ASSISTANCE WITH RESPIRATORY VENTILATION, LESS THAN 24 CONSECUTIVE HOURS, CONTINUOUS POSITIVE AIRWAY PRESSURE: ICD-10-PCS | Performed by: INTERNAL MEDICINE

## 2020-01-01 PROCEDURE — 2709999900 HC NON-CHARGEABLE SUPPLY

## 2020-01-01 PROCEDURE — 32551 INSERTION OF CHEST TUBE: CPT | Performed by: INTERNAL MEDICINE

## 2020-01-01 PROCEDURE — 94660 CPAP INITIATION&MGMT: CPT

## 2020-01-01 PROCEDURE — 82962 GLUCOSE BLOOD TEST: CPT

## 2020-01-01 PROCEDURE — P9016 RBC LEUKOCYTES REDUCED: HCPCS

## 2020-01-01 PROCEDURE — 74011000250 HC RX REV CODE- 250: Performed by: INTERNAL MEDICINE

## 2020-01-01 PROCEDURE — 81003 URINALYSIS AUTO W/O SCOPE: CPT

## 2020-01-01 PROCEDURE — 36620 INSERTION CATHETER ARTERY: CPT

## 2020-01-01 PROCEDURE — 84145 PROCALCITONIN (PCT): CPT

## 2020-01-01 PROCEDURE — 77030012390 HC DRN CHST BTL GTNG -B

## 2020-01-01 PROCEDURE — 80048 BASIC METABOLIC PNL TOTAL CA: CPT

## 2020-01-01 PROCEDURE — 99284 EMERGENCY DEPT VISIT MOD MDM: CPT

## 2020-01-01 PROCEDURE — 84100 ASSAY OF PHOSPHORUS: CPT

## 2020-01-01 PROCEDURE — 83880 ASSAY OF NATRIURETIC PEPTIDE: CPT

## 2020-01-01 PROCEDURE — 36430 TRANSFUSION BLD/BLD COMPNT: CPT

## 2020-01-01 PROCEDURE — 77030018798 HC PMP KT ENTRL FED COVD -A

## 2020-01-01 PROCEDURE — 83735 ASSAY OF MAGNESIUM: CPT

## 2020-01-01 PROCEDURE — 77030041175 HC BAG DIGNSHLD BARD -A

## 2020-01-01 PROCEDURE — 77030005402 HC CATH RAD ART LN KT TELE -B

## 2020-01-01 PROCEDURE — 77030034850

## 2020-01-01 PROCEDURE — 99232 SBSQ HOSP IP/OBS MODERATE 35: CPT | Performed by: INTERNAL MEDICINE

## 2020-01-01 PROCEDURE — 81001 URINALYSIS AUTO W/SCOPE: CPT

## 2020-01-01 PROCEDURE — 80069 RENAL FUNCTION PANEL: CPT

## 2020-01-01 PROCEDURE — 77030040361 HC SLV COMPR DVT MDII -B

## 2020-01-01 PROCEDURE — 74018 RADEX ABDOMEN 1 VIEW: CPT

## 2020-01-01 PROCEDURE — 77030005513 HC CATH URETH FOL11 MDII -B

## 2020-01-01 PROCEDURE — 74011250636 HC RX REV CODE- 250/636: Performed by: EMERGENCY MEDICINE

## 2020-01-01 PROCEDURE — 74011250637 HC RX REV CODE- 250/637: Performed by: INTERNAL MEDICINE

## 2020-01-01 PROCEDURE — P9047 ALBUMIN (HUMAN), 25%, 50ML: HCPCS | Performed by: INTERNAL MEDICINE

## 2020-01-01 PROCEDURE — 82272 OCCULT BLD FECES 1-3 TESTS: CPT

## 2020-01-01 PROCEDURE — 5A12012 PERFORMANCE OF CARDIAC OUTPUT, SINGLE, MANUAL: ICD-10-PCS | Performed by: INTERNAL MEDICINE

## 2020-01-01 PROCEDURE — 87635 SARS-COV-2 COVID-19 AMP PRB: CPT

## 2020-01-01 PROCEDURE — C9113 INJ PANTOPRAZOLE SODIUM, VIA: HCPCS | Performed by: INTERNAL MEDICINE

## 2020-01-01 PROCEDURE — 83605 ASSAY OF LACTIC ACID: CPT

## 2020-01-01 PROCEDURE — C1752 CATH,HEMODIALYSIS,SHORT-TERM: HCPCS

## 2020-01-01 PROCEDURE — C1751 CATH, INF, PER/CENT/MIDLINE: HCPCS

## 2020-01-01 PROCEDURE — 77030041174 HC STOOL COL SYS DIGNSHLD BARD -C

## 2020-01-01 PROCEDURE — 99285 EMERGENCY DEPT VISIT HI MDM: CPT

## 2020-01-01 PROCEDURE — 86900 BLOOD TYPING SEROLOGIC ABO: CPT

## 2020-01-01 PROCEDURE — 65620000000 HC RM CCU GENERAL

## 2020-01-01 PROCEDURE — 31500 INSERT EMERGENCY AIRWAY: CPT | Performed by: INTERNAL MEDICINE

## 2020-01-01 PROCEDURE — 77030040393 HC DRSG OPTIFOAM GENT MDII -B

## 2020-01-01 PROCEDURE — 36573 INSJ PICC RS&I 5 YR+: CPT | Performed by: INTERNAL MEDICINE

## 2020-01-01 PROCEDURE — 85610 PROTHROMBIN TIME: CPT

## 2020-01-01 PROCEDURE — 70450 CT HEAD/BRAIN W/O DYE: CPT

## 2020-01-01 PROCEDURE — 77030031476 HC EXCH HEAT MOISTW FLTR HALY -A

## 2020-01-01 PROCEDURE — 74011000258 HC RX REV CODE- 258: Performed by: EMERGENCY MEDICINE

## 2020-01-01 PROCEDURE — 5A1D80Z PERFORMANCE OF URINARY FILTRATION, PROLONGED INTERMITTENT, 6-18 HOURS PER DAY: ICD-10-PCS | Performed by: INTERNAL MEDICINE

## 2020-01-01 PROCEDURE — XW13325 TRANSFUSION OF CONVALESCENT PLASMA (NONAUTOLOGOUS) INTO PERIPHERAL VEIN, PERCUTANEOUS APPROACH, NEW TECHNOLOGY GROUP 5: ICD-10-PCS | Performed by: INTERNAL MEDICINE

## 2020-01-01 PROCEDURE — 36556 INSERT NON-TUNNEL CV CATH: CPT

## 2020-01-01 PROCEDURE — 80076 HEPATIC FUNCTION PANEL: CPT

## 2020-01-01 PROCEDURE — 5A09457 ASSISTANCE WITH RESPIRATORY VENTILATION, 24-96 CONSECUTIVE HOURS, CONTINUOUS POSITIVE AIRWAY PRESSURE: ICD-10-PCS | Performed by: INTERNAL MEDICINE

## 2020-01-01 PROCEDURE — 96365 THER/PROPH/DIAG IV INF INIT: CPT

## 2020-01-01 PROCEDURE — 02HV33Z INSERTION OF INFUSION DEVICE INTO SUPERIOR VENA CAVA, PERCUTANEOUS APPROACH: ICD-10-PCS | Performed by: INTERNAL MEDICINE

## 2020-01-01 PROCEDURE — 77010033711 HC HIGH FLOW OXYGEN

## 2020-01-01 PROCEDURE — 99223 1ST HOSP IP/OBS HIGH 75: CPT | Performed by: INTERNAL MEDICINE

## 2020-01-01 PROCEDURE — 0W9930Z DRAINAGE OF RIGHT PLEURAL CAVITY WITH DRAINAGE DEVICE, PERCUTANEOUS APPROACH: ICD-10-PCS | Performed by: INTERNAL MEDICINE

## 2020-01-01 PROCEDURE — 36556 INSERT NON-TUNNEL CV CATH: CPT | Performed by: INTERNAL MEDICINE

## 2020-01-01 PROCEDURE — XW033E5 INTRODUCTION OF REMDESIVIR ANTI-INFECTIVE INTO PERIPHERAL VEIN, PERCUTANEOUS APPROACH, NEW TECHNOLOGY GROUP 5: ICD-10-PCS | Performed by: INTERNAL MEDICINE

## 2020-01-01 PROCEDURE — 65270000029 HC RM PRIVATE

## 2020-01-01 PROCEDURE — 74011000250 HC RX REV CODE- 250

## 2020-01-01 PROCEDURE — 84300 ASSAY OF URINE SODIUM: CPT

## 2020-01-01 PROCEDURE — 77030041247 HC PROTECTOR HEEL HEELMEDIX MDII -B

## 2020-01-01 PROCEDURE — 85018 HEMOGLOBIN: CPT

## 2020-01-01 PROCEDURE — 32551 INSERTION OF CHEST TUBE: CPT

## 2020-01-01 PROCEDURE — 96375 TX/PRO/DX INJ NEW DRUG ADDON: CPT

## 2020-01-01 PROCEDURE — 36620 INSERTION CATHETER ARTERY: CPT | Performed by: INTERNAL MEDICINE

## 2020-01-01 PROCEDURE — 36592 COLLECT BLOOD FROM PICC: CPT

## 2020-01-01 PROCEDURE — 93005 ELECTROCARDIOGRAM TRACING: CPT | Performed by: EMERGENCY MEDICINE

## 2020-01-01 PROCEDURE — 94761 N-INVAS EAR/PLS OXIMETRY MLT: CPT

## 2020-01-01 PROCEDURE — 77030013794 HC KT TRNSDUC BLD EDWD -B

## 2020-01-01 PROCEDURE — 85379 FIBRIN DEGRADATION QUANT: CPT

## 2020-01-01 PROCEDURE — 87040 BLOOD CULTURE FOR BACTERIA: CPT

## 2020-01-01 PROCEDURE — 86923 COMPATIBILITY TEST ELECTRIC: CPT

## 2020-01-01 PROCEDURE — 94002 VENT MGMT INPAT INIT DAY: CPT

## 2020-01-01 PROCEDURE — 5A1955Z RESPIRATORY VENTILATION, GREATER THAN 96 CONSECUTIVE HOURS: ICD-10-PCS | Performed by: INTERNAL MEDICINE

## 2020-01-01 PROCEDURE — 85027 COMPLETE CBC AUTOMATED: CPT

## 2020-01-01 PROCEDURE — 76775 US EXAM ABDO BACK WALL LIM: CPT

## 2020-01-01 PROCEDURE — 80074 ACUTE HEPATITIS PANEL: CPT

## 2020-01-01 PROCEDURE — 0W9B30Z DRAINAGE OF LEFT PLEURAL CAVITY WITH DRAINAGE DEVICE, PERCUTANEOUS APPROACH: ICD-10-PCS | Performed by: INTERNAL MEDICINE

## 2020-01-01 PROCEDURE — 84156 ASSAY OF PROTEIN URINE: CPT

## 2020-01-01 PROCEDURE — 81015 MICROSCOPIC EXAM OF URINE: CPT

## 2020-01-01 PROCEDURE — 86022 PLATELET ANTIBODIES: CPT

## 2020-01-01 PROCEDURE — 74011250636 HC RX REV CODE- 250/636

## 2020-01-01 RX ORDER — EPINEPHRINE 0.1 MG/ML
INJECTION INTRACARDIAC; INTRAVENOUS
Status: COMPLETED | OUTPATIENT
Start: 2020-01-01 | End: 2020-01-01

## 2020-01-01 RX ORDER — MORPHINE SULFATE 10 MG/ML
5 INJECTION, SOLUTION INTRAMUSCULAR; INTRAVENOUS
Status: DISCONTINUED | OUTPATIENT
Start: 2020-01-01 | End: 2020-12-16 | Stop reason: HOSPADM

## 2020-01-01 RX ORDER — AMIODARONE HYDROCHLORIDE 150 MG/3ML
INJECTION, SOLUTION INTRAVENOUS
Status: COMPLETED | OUTPATIENT
Start: 2020-01-01 | End: 2020-01-01

## 2020-01-01 RX ORDER — ACETAMINOPHEN 650 MG/1
650 SUPPOSITORY RECTAL
Status: DISCONTINUED | OUTPATIENT
Start: 2020-01-01 | End: 2020-01-01

## 2020-01-01 RX ORDER — SODIUM CHLORIDE 9 MG/ML
250 INJECTION, SOLUTION INTRAVENOUS AS NEEDED
Status: DISCONTINUED | OUTPATIENT
Start: 2020-01-01 | End: 2020-12-16 | Stop reason: HOSPADM

## 2020-01-01 RX ORDER — METOCLOPRAMIDE HYDROCHLORIDE 5 MG/ML
10 INJECTION INTRAMUSCULAR; INTRAVENOUS EVERY 6 HOURS
Status: DISCONTINUED | OUTPATIENT
Start: 2020-01-01 | End: 2020-12-16 | Stop reason: HOSPADM

## 2020-01-01 RX ORDER — DEXTROSE MONOHYDRATE AND SODIUM CHLORIDE 5; .9 G/100ML; G/100ML
50 INJECTION, SOLUTION INTRAVENOUS CONTINUOUS
Status: DISCONTINUED | OUTPATIENT
Start: 2020-01-01 | End: 2020-01-01

## 2020-01-01 RX ORDER — ENOXAPARIN SODIUM 100 MG/ML
30 INJECTION SUBCUTANEOUS EVERY 12 HOURS
Status: DISCONTINUED | OUTPATIENT
Start: 2020-01-01 | End: 2020-01-01

## 2020-01-01 RX ORDER — HYDRALAZINE HYDROCHLORIDE 20 MG/ML
20 INJECTION INTRAMUSCULAR; INTRAVENOUS
Status: DISPENSED | OUTPATIENT
Start: 2020-01-01 | End: 2020-01-01

## 2020-01-01 RX ORDER — SODIUM CHLORIDE 9 MG/ML
125 INJECTION, SOLUTION INTRAVENOUS CONTINUOUS
Status: DISCONTINUED | OUTPATIENT
Start: 2020-01-01 | End: 2020-01-01

## 2020-01-01 RX ORDER — DOCUSATE SODIUM 50 MG/5ML
100 LIQUID ORAL DAILY
Status: DISCONTINUED | OUTPATIENT
Start: 2020-01-01 | End: 2020-12-16 | Stop reason: HOSPADM

## 2020-01-01 RX ORDER — NOREPINEPHRINE BITARTRATE/D5W 4MG/250ML
PLASTIC BAG, INJECTION (ML) INTRAVENOUS
Status: COMPLETED
Start: 2020-01-01 | End: 2020-01-01

## 2020-01-01 RX ORDER — POLYETHYLENE GLYCOL 3350 17 G/17G
17 POWDER, FOR SOLUTION ORAL DAILY
Status: DISCONTINUED | OUTPATIENT
Start: 2020-01-01 | End: 2020-12-16 | Stop reason: HOSPADM

## 2020-01-01 RX ORDER — SUCCINYLCHOLINE CHLORIDE 20 MG/ML INJECTION SOLUTION
90 SOLUTION
Status: COMPLETED | OUTPATIENT
Start: 2020-01-01 | End: 2020-01-01

## 2020-01-01 RX ORDER — CALCIUM CHLORIDE INJECTION 100 MG/ML
INJECTION, SOLUTION INTRAVENOUS
Status: COMPLETED | OUTPATIENT
Start: 2020-01-01 | End: 2020-01-01

## 2020-01-01 RX ORDER — DEXTROSE 50 % IN WATER (D50W) INTRAVENOUS SYRINGE
Status: COMPLETED
Start: 2020-01-01 | End: 2020-01-01

## 2020-01-01 RX ORDER — METOCLOPRAMIDE HYDROCHLORIDE 5 MG/ML
5 INJECTION INTRAMUSCULAR; INTRAVENOUS EVERY 6 HOURS
Status: DISCONTINUED | OUTPATIENT
Start: 2020-01-01 | End: 2020-01-01

## 2020-01-01 RX ORDER — ALBUMIN HUMAN 250 G/1000ML
25 SOLUTION INTRAVENOUS EVERY 6 HOURS
Status: DISCONTINUED | OUTPATIENT
Start: 2020-01-01 | End: 2020-01-01 | Stop reason: HOSPADM

## 2020-01-01 RX ORDER — ETOMIDATE 2 MG/ML
20 INJECTION INTRAVENOUS ONCE
Status: COMPLETED | OUTPATIENT
Start: 2020-01-01 | End: 2020-01-01

## 2020-01-01 RX ORDER — HEPARIN SODIUM 1000 [USP'U]/ML
40 INJECTION, SOLUTION INTRAVENOUS; SUBCUTANEOUS ONCE
Status: COMPLETED | OUTPATIENT
Start: 2020-01-01 | End: 2020-01-01

## 2020-01-01 RX ORDER — HEPARIN SODIUM 5000 [USP'U]/ML
5000 INJECTION, SOLUTION INTRAVENOUS; SUBCUTANEOUS EVERY 8 HOURS
Status: DISCONTINUED | OUTPATIENT
Start: 2020-01-01 | End: 2020-01-01

## 2020-01-01 RX ORDER — SODIUM CHLORIDE 0.9 % (FLUSH) 0.9 %
5-40 SYRINGE (ML) INJECTION AS NEEDED
Status: DISCONTINUED | OUTPATIENT
Start: 2020-01-01 | End: 2020-12-16 | Stop reason: HOSPADM

## 2020-01-01 RX ORDER — ACETAMINOPHEN 325 MG/1
650 TABLET ORAL
Status: DISCONTINUED | OUTPATIENT
Start: 2020-01-01 | End: 2020-01-01

## 2020-01-01 RX ORDER — MORPHINE SULFATE 10 MG/ML
INJECTION, SOLUTION INTRAMUSCULAR; INTRAVENOUS
Status: COMPLETED
Start: 2020-01-01 | End: 2020-01-01

## 2020-01-01 RX ORDER — DEXAMETHASONE SODIUM PHOSPHATE 4 MG/ML
6 INJECTION, SOLUTION INTRA-ARTICULAR; INTRALESIONAL; INTRAMUSCULAR; INTRAVENOUS; SOFT TISSUE DAILY
Status: COMPLETED | OUTPATIENT
Start: 2020-01-01 | End: 2020-01-01

## 2020-01-01 RX ORDER — ATRACURIUM BESYLATE 10 MG/ML
0.4 INJECTION, SOLUTION INTRAVENOUS ONCE
Status: COMPLETED | OUTPATIENT
Start: 2020-01-01 | End: 2020-01-01

## 2020-01-01 RX ORDER — ACETAMINOPHEN 325 MG/1
650 TABLET ORAL
Status: DISCONTINUED | OUTPATIENT
Start: 2020-01-01 | End: 2020-12-16 | Stop reason: HOSPADM

## 2020-01-01 RX ORDER — SODIUM CHLORIDE 0.9 % (FLUSH) 0.9 %
10 SYRINGE (ML) INJECTION EVERY 8 HOURS
Status: DISCONTINUED | OUTPATIENT
Start: 2020-01-01 | End: 2020-01-01

## 2020-01-01 RX ORDER — HEPARIN SODIUM 5000 [USP'U]/ML
40 INJECTION, SOLUTION INTRAVENOUS; SUBCUTANEOUS ONCE
Status: COMPLETED | OUTPATIENT
Start: 2020-01-01 | End: 2020-01-01

## 2020-01-01 RX ORDER — PROPOFOL 10 MG/ML
0-50 VIAL (ML) INTRAVENOUS
Status: DISCONTINUED | OUTPATIENT
Start: 2020-01-01 | End: 2020-12-16 | Stop reason: HOSPADM

## 2020-01-01 RX ORDER — SODIUM CHLORIDE 9 MG/ML
0-8 INJECTION, SOLUTION INTRAVENOUS
Status: DISCONTINUED | OUTPATIENT
Start: 2020-01-01 | End: 2020-12-16 | Stop reason: HOSPADM

## 2020-01-01 RX ORDER — DEXTROSE 50 % IN WATER (D50W) INTRAVENOUS SYRINGE
Status: COMPLETED | OUTPATIENT
Start: 2020-01-01 | End: 2020-01-01

## 2020-01-01 RX ORDER — SODIUM BICARBONATE 1 MEQ/ML
SYRINGE (ML) INTRAVENOUS
Status: COMPLETED | OUTPATIENT
Start: 2020-01-01 | End: 2020-01-01

## 2020-01-01 RX ORDER — DEXAMETHASONE SODIUM PHOSPHATE 100 MG/10ML
6 INJECTION INTRAMUSCULAR; INTRAVENOUS
Status: COMPLETED | OUTPATIENT
Start: 2020-01-01 | End: 2020-01-01

## 2020-01-01 RX ORDER — SODIUM CHLORIDE 0.9 % (FLUSH) 0.9 %
5-40 SYRINGE (ML) INJECTION EVERY 8 HOURS
Status: DISCONTINUED | OUTPATIENT
Start: 2020-01-01 | End: 2020-12-16 | Stop reason: HOSPADM

## 2020-01-01 RX ORDER — SODIUM BICARBONATE 84 MG/ML
100 INJECTION, SOLUTION INTRAVENOUS ONCE
Status: COMPLETED | OUTPATIENT
Start: 2020-01-01 | End: 2020-01-01

## 2020-01-01 RX ORDER — HEPARIN SODIUM 5000 [USP'U]/100ML
12-25 INJECTION, SOLUTION INTRAVENOUS
Status: DISCONTINUED | OUTPATIENT
Start: 2020-01-01 | End: 2020-12-16 | Stop reason: HOSPADM

## 2020-01-01 RX ORDER — DEXTROSE 50 % IN WATER (D50W) INTRAVENOUS SYRINGE
50 ONCE
Status: COMPLETED | OUTPATIENT
Start: 2020-01-01 | End: 2020-01-01

## 2020-01-01 RX ORDER — NOREPINEPHRINE BITARTRATE/D5W 4MG/250ML
.5-3 PLASTIC BAG, INJECTION (ML) INTRAVENOUS
Status: DISCONTINUED | OUTPATIENT
Start: 2020-01-01 | End: 2020-01-01

## 2020-01-01 RX ORDER — DEXAMETHASONE SODIUM PHOSPHATE 4 MG/ML
6 INJECTION, SOLUTION INTRA-ARTICULAR; INTRALESIONAL; INTRAMUSCULAR; INTRAVENOUS; SOFT TISSUE DAILY
Status: DISCONTINUED | OUTPATIENT
Start: 2020-01-01 | End: 2020-01-01

## 2020-01-01 RX ORDER — FENTANYL CITRATE 50 UG/ML
100 INJECTION, SOLUTION INTRAMUSCULAR; INTRAVENOUS ONCE
Status: COMPLETED | OUTPATIENT
Start: 2020-01-01 | End: 2020-01-01

## 2020-01-01 RX ORDER — DOCUSATE SODIUM 100 MG/1
100 CAPSULE, LIQUID FILLED ORAL DAILY
Status: DISCONTINUED | OUTPATIENT
Start: 2020-01-01 | End: 2020-01-01

## 2020-01-01 RX ORDER — FENTANYL CITRATE-0.9 % NACL/PF 25 MCG/ML
0-200 PLASTIC BAG, INJECTION (ML) INJECTION
Status: DISCONTINUED | OUTPATIENT
Start: 2020-01-01 | End: 2020-12-16 | Stop reason: HOSPADM

## 2020-01-01 RX ORDER — INSULIN LISPRO 100 [IU]/ML
INJECTION, SOLUTION INTRAVENOUS; SUBCUTANEOUS EVERY 6 HOURS
Status: DISCONTINUED | OUTPATIENT
Start: 2020-01-01 | End: 2020-12-16 | Stop reason: HOSPADM

## 2020-01-01 RX ORDER — HEPARIN SODIUM 5000 [USP'U]/ML
20 INJECTION, SOLUTION INTRAVENOUS; SUBCUTANEOUS ONCE
Status: COMPLETED | OUTPATIENT
Start: 2020-01-01 | End: 2020-01-01

## 2020-01-01 RX ADMIN — DEXMEDETOMIDINE HYDROCHLORIDE 1.5 MCG/KG/HR: 100 INJECTION, SOLUTION INTRAVENOUS at 23:00

## 2020-01-01 RX ADMIN — SODIUM CHLORIDE 125 ML/HR: 900 INJECTION, SOLUTION INTRAVENOUS at 12:42

## 2020-01-01 RX ADMIN — Medication 10 ML: at 22:57

## 2020-01-01 RX ADMIN — ACETAMINOPHEN 650 MG: 325 TABLET, FILM COATED ORAL at 08:34

## 2020-01-01 RX ADMIN — NOREPINEPHRINE-DEXTROSE IV SOLUTION 4 MG/250ML-5% 12 MCG/MIN: 4-5/250 SOLUTION at 14:42

## 2020-01-01 RX ADMIN — HEPARIN SODIUM 5000 UNITS: 5000 INJECTION INTRAVENOUS; SUBCUTANEOUS at 04:31

## 2020-01-01 RX ADMIN — CEFTRIAXONE SODIUM 1 G: 1 INJECTION, POWDER, FOR SOLUTION INTRAMUSCULAR; INTRAVENOUS at 09:04

## 2020-01-01 RX ADMIN — VASOPRESSIN 0.01 UNITS/MIN: 20 INJECTION INTRAVENOUS at 00:50

## 2020-01-01 RX ADMIN — DEXMEDETOMIDINE HYDROCHLORIDE 1.5 MCG/KG/HR: 100 INJECTION, SOLUTION INTRAVENOUS at 07:55

## 2020-01-01 RX ADMIN — POLYETHYLENE GLYCOL 3350 17 G: 17 POWDER, FOR SOLUTION ORAL at 15:00

## 2020-01-01 RX ADMIN — Medication 40 ML: at 05:47

## 2020-01-01 RX ADMIN — Medication 10 ML: at 22:00

## 2020-01-01 RX ADMIN — SODIUM CHLORIDE 5 MCG/KG/MIN: 900 INJECTION, SOLUTION INTRAVENOUS at 10:38

## 2020-01-01 RX ADMIN — PROPOFOL 5 MCG/KG/MIN: 10 INJECTION, EMULSION INTRAVENOUS at 14:05

## 2020-01-01 RX ADMIN — DEXMEDETOMIDINE HYDROCHLORIDE 1.4 MCG/KG/HR: 100 INJECTION, SOLUTION INTRAVENOUS at 03:39

## 2020-01-01 RX ADMIN — PROPOFOL 30 MCG/KG/MIN: 10 INJECTION, EMULSION INTRAVENOUS at 02:40

## 2020-01-01 RX ADMIN — HEPARIN SODIUM 1950 UNITS: 5000 INJECTION INTRAVENOUS; SUBCUTANEOUS at 01:24

## 2020-01-01 RX ADMIN — DEXTROSE MONOHYDRATE 25 G: 25 INJECTION, SOLUTION INTRAVENOUS at 12:07

## 2020-01-01 RX ADMIN — REMDESIVIR 100 MG: 100 INJECTION, POWDER, LYOPHILIZED, FOR SOLUTION INTRAVENOUS at 10:31

## 2020-01-01 RX ADMIN — METOCLOPRAMIDE HYDROCHLORIDE 10 MG: 5 INJECTION INTRAMUSCULAR; INTRAVENOUS at 21:38

## 2020-01-01 RX ADMIN — NOREPINEPHRINE-DEXTROSE IV SOLUTION 4 MG/250ML-5% 14 MCG/MIN: 4-5/250 SOLUTION at 20:19

## 2020-01-01 RX ADMIN — DEXAMETHASONE SODIUM PHOSPHATE 6 MG: 4 INJECTION, SOLUTION INTRAMUSCULAR; INTRAVENOUS at 08:46

## 2020-01-01 RX ADMIN — HYDRALAZINE HYDROCHLORIDE 20 MG: 20 INJECTION, SOLUTION INTRAMUSCULAR; INTRAVENOUS at 00:58

## 2020-01-01 RX ADMIN — DEXTROSE MONOHYDRATE 25 G: 25 INJECTION, SOLUTION INTRAVENOUS at 12:17

## 2020-01-01 RX ADMIN — ENOXAPARIN SODIUM 30 MG: 30 INJECTION SUBCUTANEOUS at 11:14

## 2020-01-01 RX ADMIN — INSULIN LISPRO 2 UNITS: 100 INJECTION, SOLUTION INTRAVENOUS; SUBCUTANEOUS at 17:09

## 2020-01-01 RX ADMIN — DEXAMETHASONE SODIUM PHOSPHATE 6 MG: 4 INJECTION, SOLUTION INTRAMUSCULAR; INTRAVENOUS at 09:48

## 2020-01-01 RX ADMIN — HYDRALAZINE HYDROCHLORIDE 20 MG: 20 INJECTION, SOLUTION INTRAMUSCULAR; INTRAVENOUS at 05:47

## 2020-01-01 RX ADMIN — METOCLOPRAMIDE HYDROCHLORIDE 10 MG: 5 INJECTION INTRAMUSCULAR; INTRAVENOUS at 10:20

## 2020-01-01 RX ADMIN — Medication 10 ML: at 06:00

## 2020-01-01 RX ADMIN — METOCLOPRAMIDE HYDROCHLORIDE 10 MG: 5 INJECTION INTRAMUSCULAR; INTRAVENOUS at 21:13

## 2020-01-01 RX ADMIN — MORPHINE SULFATE 5 MG: 10 INJECTION, SOLUTION INTRAMUSCULAR; INTRAVENOUS at 21:49

## 2020-01-01 RX ADMIN — ALBUMIN (HUMAN) 25 G: 0.25 INJECTION, SOLUTION INTRAVENOUS at 07:15

## 2020-01-01 RX ADMIN — CEFEPIME HYDROCHLORIDE 1 G: 1 INJECTION, POWDER, FOR SOLUTION INTRAMUSCULAR; INTRAVENOUS at 14:48

## 2020-01-01 RX ADMIN — METOCLOPRAMIDE HYDROCHLORIDE 10 MG: 5 INJECTION INTRAMUSCULAR; INTRAVENOUS at 22:11

## 2020-01-01 RX ADMIN — PROPOFOL 30 MCG/KG/MIN: 10 INJECTION, EMULSION INTRAVENOUS at 16:07

## 2020-01-01 RX ADMIN — Medication 40 ML: at 05:37

## 2020-01-01 RX ADMIN — CEFTRIAXONE SODIUM 1 G: 1 INJECTION, POWDER, FOR SOLUTION INTRAMUSCULAR; INTRAVENOUS at 08:46

## 2020-01-01 RX ADMIN — WATER 50 NG/KG/MIN: 1 SOLUTION INTRAVENOUS at 16:05

## 2020-01-01 RX ADMIN — METOCLOPRAMIDE HYDROCHLORIDE 10 MG: 5 INJECTION INTRAMUSCULAR; INTRAVENOUS at 08:34

## 2020-01-01 RX ADMIN — NOREPINEPHRINE-DEXTROSE IV SOLUTION 4 MG/250ML-5% 30 MCG/MIN: 4-5/250 SOLUTION at 08:33

## 2020-01-01 RX ADMIN — SODIUM CHLORIDE 0.5 MCG/KG/MIN: 9 INJECTION, SOLUTION INTRAVENOUS at 04:46

## 2020-01-01 RX ADMIN — DEXTROSE MONOHYDRATE AND SODIUM CHLORIDE 50 ML/HR: 5; .9 INJECTION, SOLUTION INTRAVENOUS at 16:27

## 2020-01-01 RX ADMIN — SODIUM CHLORIDE 0.5 MCG/KG/MIN: 9 INJECTION, SOLUTION INTRAVENOUS at 10:00

## 2020-01-01 RX ADMIN — METOCLOPRAMIDE HYDROCHLORIDE 10 MG: 5 INJECTION INTRAMUSCULAR; INTRAVENOUS at 21:45

## 2020-01-01 RX ADMIN — DEXMEDETOMIDINE HYDROCHLORIDE 0.8 MCG/KG/HR: 100 INJECTION, SOLUTION INTRAVENOUS at 16:24

## 2020-01-01 RX ADMIN — Medication 25 MCG/HR: at 06:54

## 2020-01-01 RX ADMIN — POLYETHYLENE GLYCOL 3350 17 G: 17 POWDER, FOR SOLUTION ORAL at 10:20

## 2020-01-01 RX ADMIN — PROPOFOL 30 MCG/KG/MIN: 10 INJECTION, EMULSION INTRAVENOUS at 21:38

## 2020-01-01 RX ADMIN — Medication 10 ML: at 01:41

## 2020-01-01 RX ADMIN — Medication 10 ML: at 14:00

## 2020-01-01 RX ADMIN — SODIUM CHLORIDE 4.9 ML/HR: 900 INJECTION, SOLUTION INTRAVENOUS at 11:02

## 2020-01-01 RX ADMIN — AZITHROMYCIN 500 MG: 500 INJECTION, POWDER, LYOPHILIZED, FOR SOLUTION INTRAVENOUS at 19:38

## 2020-01-01 RX ADMIN — METOCLOPRAMIDE HYDROCHLORIDE 10 MG: 5 INJECTION INTRAMUSCULAR; INTRAVENOUS at 15:22

## 2020-01-01 RX ADMIN — Medication 40 ML: at 21:07

## 2020-01-01 RX ADMIN — PROPOFOL 20 MCG/KG/MIN: 10 INJECTION, EMULSION INTRAVENOUS at 13:28

## 2020-01-01 RX ADMIN — FAMOTIDINE 20 MG: 10 INJECTION INTRAVENOUS at 09:54

## 2020-01-01 RX ADMIN — Medication 30 ML: at 14:00

## 2020-01-01 RX ADMIN — METOCLOPRAMIDE HYDROCHLORIDE 10 MG: 5 INJECTION INTRAMUSCULAR; INTRAVENOUS at 16:43

## 2020-01-01 RX ADMIN — REMDESIVIR 200 MG: 100 INJECTION, POWDER, LYOPHILIZED, FOR SOLUTION INTRAVENOUS at 12:56

## 2020-01-01 RX ADMIN — DEXAMETHASONE SODIUM PHOSPHATE 6 MG: 4 INJECTION, SOLUTION INTRAMUSCULAR; INTRAVENOUS at 08:49

## 2020-01-01 RX ADMIN — EPINEPHRINE 1 MG: 0.1 INJECTION, SOLUTION ENDOTRACHEAL; INTRACARDIAC; INTRAVENOUS at 11:54

## 2020-01-01 RX ADMIN — HEPARIN SODIUM 3900 UNITS: 5000 INJECTION INTRAVENOUS; SUBCUTANEOUS at 21:37

## 2020-01-01 RX ADMIN — METOCLOPRAMIDE HYDROCHLORIDE 10 MG: 5 INJECTION INTRAMUSCULAR; INTRAVENOUS at 17:09

## 2020-01-01 RX ADMIN — DEXMEDETOMIDINE HYDROCHLORIDE 1 MCG/KG/HR: 100 INJECTION, SOLUTION INTRAVENOUS at 14:00

## 2020-01-01 RX ADMIN — DEXTROSE: 50 INJECTION, SOLUTION INTRAVENOUS at 12:07

## 2020-01-01 RX ADMIN — Medication 10 ML: at 21:39

## 2020-01-01 RX ADMIN — METOCLOPRAMIDE HYDROCHLORIDE 10 MG: 5 INJECTION INTRAMUSCULAR; INTRAVENOUS at 04:03

## 2020-01-01 RX ADMIN — EPINEPHRINE 1 MG: 0.1 INJECTION, SOLUTION ENDOTRACHEAL; INTRACARDIAC; INTRAVENOUS at 12:14

## 2020-01-01 RX ADMIN — DOCUSATE SODIUM 100 MG: 50 LIQUID ORAL at 10:20

## 2020-01-01 RX ADMIN — ALBUMIN (HUMAN) 25 G: 0.25 INJECTION, SOLUTION INTRAVENOUS at 01:05

## 2020-01-01 RX ADMIN — FAMOTIDINE 20 MG: 10 INJECTION INTRAVENOUS at 08:27

## 2020-01-01 RX ADMIN — HEPARIN SODIUM 22 UNITS/KG/HR: 5000 INJECTION, SOLUTION INTRAVENOUS at 06:12

## 2020-01-01 RX ADMIN — ENOXAPARIN SODIUM 30 MG: 30 INJECTION SUBCUTANEOUS at 23:24

## 2020-01-01 RX ADMIN — Medication 40 ML: at 21:11

## 2020-01-01 RX ADMIN — METOCLOPRAMIDE HYDROCHLORIDE 10 MG: 5 INJECTION INTRAMUSCULAR; INTRAVENOUS at 08:27

## 2020-01-01 RX ADMIN — SODIUM CHLORIDE 125 ML/HR: 900 INJECTION, SOLUTION INTRAVENOUS at 21:45

## 2020-01-01 RX ADMIN — METOCLOPRAMIDE HYDROCHLORIDE 10 MG: 5 INJECTION INTRAMUSCULAR; INTRAVENOUS at 16:00

## 2020-01-01 RX ADMIN — DEXTROSE MONOHYDRATE 30 MCG/MIN: 50 INJECTION, SOLUTION INTRAVENOUS at 11:12

## 2020-01-01 RX ADMIN — METOCLOPRAMIDE HYDROCHLORIDE 10 MG: 5 INJECTION INTRAMUSCULAR; INTRAVENOUS at 21:06

## 2020-01-01 RX ADMIN — SODIUM BICARBONATE 100 MEQ: 84 INJECTION, SOLUTION INTRAVENOUS at 06:42

## 2020-01-01 RX ADMIN — DEXMEDETOMIDINE HYDROCHLORIDE 0.9 MCG/KG/HR: 100 INJECTION, SOLUTION INTRAVENOUS at 23:48

## 2020-01-01 RX ADMIN — FAMOTIDINE 20 MG: 10 INJECTION INTRAVENOUS at 08:49

## 2020-01-01 RX ADMIN — HEPARIN SODIUM 3910 UNITS: 1000 INJECTION INTRAVENOUS; SUBCUTANEOUS at 05:53

## 2020-01-01 RX ADMIN — DEXAMETHASONE SODIUM PHOSPHATE 6 MG: 4 INJECTION, SOLUTION INTRAMUSCULAR; INTRAVENOUS at 08:29

## 2020-01-01 RX ADMIN — MORPHINE SULFATE 5 MG: 10 INJECTION, SOLUTION INTRAMUSCULAR; INTRAVENOUS at 04:45

## 2020-01-01 RX ADMIN — METOCLOPRAMIDE HYDROCHLORIDE 10 MG: 5 INJECTION INTRAMUSCULAR; INTRAVENOUS at 21:26

## 2020-01-01 RX ADMIN — DEXAMETHASONE SODIUM PHOSPHATE 6 MG: 4 INJECTION, SOLUTION INTRAMUSCULAR; INTRAVENOUS at 09:16

## 2020-01-01 RX ADMIN — INSULIN LISPRO 2 UNITS: 100 INJECTION, SOLUTION INTRAVENOUS; SUBCUTANEOUS at 12:15

## 2020-01-01 RX ADMIN — DEXAMETHASONE SODIUM PHOSPHATE 6 MG: 4 INJECTION, SOLUTION INTRAMUSCULAR; INTRAVENOUS at 09:03

## 2020-01-01 RX ADMIN — Medication 10 ML: at 13:08

## 2020-01-01 RX ADMIN — CEFTRIAXONE SODIUM 1 G: 1 INJECTION, POWDER, FOR SOLUTION INTRAMUSCULAR; INTRAVENOUS at 08:48

## 2020-01-01 RX ADMIN — MORPHINE SULFATE 5 MG: 10 INJECTION, SOLUTION INTRAMUSCULAR; INTRAVENOUS at 01:23

## 2020-01-01 RX ADMIN — INSULIN LISPRO 4 UNITS: 100 INJECTION, SOLUTION INTRAVENOUS; SUBCUTANEOUS at 00:00

## 2020-01-01 RX ADMIN — PROPOFOL 20 MCG/KG/MIN: 10 INJECTION, EMULSION INTRAVENOUS at 03:55

## 2020-01-01 RX ADMIN — ENOXAPARIN SODIUM 30 MG: 30 INJECTION SUBCUTANEOUS at 23:00

## 2020-01-01 RX ADMIN — WATER 50 NG/KG/MIN: 1 SOLUTION INTRAVENOUS at 22:11

## 2020-01-01 RX ADMIN — Medication 150 MCG/HR: at 04:40

## 2020-01-01 RX ADMIN — Medication 10 ML: at 21:26

## 2020-01-01 RX ADMIN — AMIODARONE HYDROCHLORIDE 150 MG: 50 INJECTION, SOLUTION INTRAVENOUS at 11:58

## 2020-01-01 RX ADMIN — POLYETHYLENE GLYCOL 3350 17 G: 17 POWDER, FOR SOLUTION ORAL at 08:27

## 2020-01-01 RX ADMIN — HYDRALAZINE HYDROCHLORIDE 20 MG: 20 INJECTION, SOLUTION INTRAMUSCULAR; INTRAVENOUS at 09:30

## 2020-01-01 RX ADMIN — REMDESIVIR 100 MG: 100 INJECTION, POWDER, LYOPHILIZED, FOR SOLUTION INTRAVENOUS at 11:23

## 2020-01-01 RX ADMIN — METOCLOPRAMIDE HYDROCHLORIDE 10 MG: 5 INJECTION INTRAMUSCULAR; INTRAVENOUS at 12:42

## 2020-01-01 RX ADMIN — AZITHROMYCIN 500 MG: 500 INJECTION, POWDER, LYOPHILIZED, FOR SOLUTION INTRAVENOUS at 20:34

## 2020-01-01 RX ADMIN — ALBUMIN (HUMAN) 25 G: 0.25 INJECTION, SOLUTION INTRAVENOUS at 17:09

## 2020-01-01 RX ADMIN — ENOXAPARIN SODIUM 30 MG: 30 INJECTION SUBCUTANEOUS at 10:31

## 2020-01-01 RX ADMIN — PROPOFOL 20 MCG/KG/MIN: 10 INJECTION, EMULSION INTRAVENOUS at 18:13

## 2020-01-01 RX ADMIN — SODIUM CHLORIDE 0.5 MCG/KG/MIN: 9 INJECTION, SOLUTION INTRAVENOUS at 23:10

## 2020-01-01 RX ADMIN — Medication 125 MCG/HR: at 01:20

## 2020-01-01 RX ADMIN — ALBUMIN (HUMAN) 25 G: 0.25 INJECTION, SOLUTION INTRAVENOUS at 05:50

## 2020-01-01 RX ADMIN — AMIODARONE HYDROCHLORIDE 1 MG/MIN: 50 INJECTION, SOLUTION INTRAVENOUS at 11:59

## 2020-01-01 RX ADMIN — MINERAL OIL AND WHITE PETROLATUM: 150; 830 OINTMENT OPHTHALMIC at 11:43

## 2020-01-01 RX ADMIN — CALCIUM CHLORIDE 1 G: 100 INJECTION, SOLUTION INTRAVENOUS; INTRAVENTRICULAR at 11:56

## 2020-01-01 RX ADMIN — DEXAMETHASONE SODIUM PHOSPHATE 6 MG: 10 INJECTION INTRAMUSCULAR; INTRAVENOUS at 16:12

## 2020-01-01 RX ADMIN — WATER 40 NG/KG/MIN: 1 SOLUTION INTRAVENOUS at 09:51

## 2020-01-01 RX ADMIN — PROPOFOL 20 MCG/KG/MIN: 10 INJECTION, EMULSION INTRAVENOUS at 05:56

## 2020-01-01 RX ADMIN — Medication 150 MCG/HR: at 13:22

## 2020-01-01 RX ADMIN — DEXMEDETOMIDINE HYDROCHLORIDE 1 MCG/KG/HR: 100 INJECTION, SOLUTION INTRAVENOUS at 21:50

## 2020-01-01 RX ADMIN — DEXAMETHASONE SODIUM PHOSPHATE 6 MG: 4 INJECTION, SOLUTION INTRAMUSCULAR; INTRAVENOUS at 08:45

## 2020-01-01 RX ADMIN — Medication 10 ML: at 05:51

## 2020-01-01 RX ADMIN — DEXTROSE MONOHYDRATE AND SODIUM CHLORIDE 50 ML/HR: 5; .9 INJECTION, SOLUTION INTRAVENOUS at 16:28

## 2020-01-01 RX ADMIN — Medication 40 ML: at 21:56

## 2020-01-01 RX ADMIN — WATER 50 NG/KG/MIN: 1 SOLUTION INTRAVENOUS at 20:15

## 2020-01-01 RX ADMIN — PROPOFOL 25 MCG/KG/MIN: 10 INJECTION, EMULSION INTRAVENOUS at 08:45

## 2020-01-01 RX ADMIN — SODIUM CHLORIDE 125 ML/HR: 900 INJECTION, SOLUTION INTRAVENOUS at 03:30

## 2020-01-01 RX ADMIN — DOCUSATE SODIUM 100 MG: 50 LIQUID ORAL at 08:27

## 2020-01-01 RX ADMIN — PHENYLEPHRINE HYDROCHLORIDE 30 MCG/MIN: 10 INJECTION INTRAVENOUS at 07:40

## 2020-01-01 RX ADMIN — PROPOFOL 20 MCG/KG/MIN: 10 INJECTION, EMULSION INTRAVENOUS at 03:32

## 2020-01-01 RX ADMIN — SODIUM CHLORIDE 125 ML/HR: 900 INJECTION, SOLUTION INTRAVENOUS at 12:05

## 2020-01-01 RX ADMIN — VASOPRESSIN 0.04 UNITS/MIN: 20 INJECTION INTRAVENOUS at 09:51

## 2020-01-01 RX ADMIN — HYDRALAZINE HYDROCHLORIDE 20 MG: 20 INJECTION, SOLUTION INTRAMUSCULAR; INTRAVENOUS at 04:52

## 2020-01-01 RX ADMIN — HEPARIN SODIUM 5000 UNITS: 5000 INJECTION INTRAVENOUS; SUBCUTANEOUS at 21:11

## 2020-01-01 RX ADMIN — PROPOFOL 30 MCG/KG/MIN: 10 INJECTION, EMULSION INTRAVENOUS at 04:20

## 2020-01-01 RX ADMIN — DOCUSATE SODIUM 100 MG: 100 CAPSULE ORAL at 12:37

## 2020-01-01 RX ADMIN — CEFTRIAXONE SODIUM 1 G: 1 INJECTION, POWDER, FOR SOLUTION INTRAMUSCULAR; INTRAVENOUS at 07:55

## 2020-01-01 RX ADMIN — Medication 10 ML: at 05:48

## 2020-01-01 RX ADMIN — Medication 10 ML: at 13:29

## 2020-01-01 RX ADMIN — METOCLOPRAMIDE HYDROCHLORIDE 10 MG: 5 INJECTION INTRAMUSCULAR; INTRAVENOUS at 08:51

## 2020-01-01 RX ADMIN — Medication 10 ML: at 21:49

## 2020-01-01 RX ADMIN — ALBUMIN (HUMAN) 25 G: 0.25 INJECTION, SOLUTION INTRAVENOUS at 11:06

## 2020-01-01 RX ADMIN — METOCLOPRAMIDE HYDROCHLORIDE 10 MG: 5 INJECTION INTRAMUSCULAR; INTRAVENOUS at 04:14

## 2020-01-01 RX ADMIN — SODIUM BICARBONATE 50 MEQ: 84 INJECTION, SOLUTION INTRAVENOUS at 12:15

## 2020-01-01 RX ADMIN — Medication 20 ML: at 01:01

## 2020-01-01 RX ADMIN — CEFTRIAXONE SODIUM 1 G: 1 INJECTION, POWDER, FOR SOLUTION INTRAMUSCULAR; INTRAVENOUS at 08:29

## 2020-01-01 RX ADMIN — Medication 10 ML: at 00:54

## 2020-01-01 RX ADMIN — FENTANYL CITRATE 100 MCG: 50 INJECTION, SOLUTION INTRAMUSCULAR; INTRAVENOUS at 09:32

## 2020-01-01 RX ADMIN — ENOXAPARIN SODIUM 30 MG: 30 INJECTION SUBCUTANEOUS at 11:37

## 2020-01-01 RX ADMIN — AZITHROMYCIN 500 MG: 500 INJECTION, POWDER, LYOPHILIZED, FOR SOLUTION INTRAVENOUS at 20:05

## 2020-01-01 RX ADMIN — DEXTROSE MONOHYDRATE AND SODIUM CHLORIDE 50 ML/HR: 5; .9 INJECTION, SOLUTION INTRAVENOUS at 11:31

## 2020-01-01 RX ADMIN — Medication 30 ML: at 16:22

## 2020-01-01 RX ADMIN — PROPOFOL 20 MCG/KG/MIN: 10 INJECTION, EMULSION INTRAVENOUS at 00:35

## 2020-01-01 RX ADMIN — PROPOFOL 10 MCG/KG/MIN: 10 INJECTION, EMULSION INTRAVENOUS at 12:52

## 2020-01-01 RX ADMIN — SODIUM CHLORIDE 500 ML: 900 INJECTION, SOLUTION INTRAVENOUS at 03:35

## 2020-01-01 RX ADMIN — DEXMEDETOMIDINE HYDROCHLORIDE 1 MCG/KG/HR: 100 INJECTION, SOLUTION INTRAVENOUS at 08:48

## 2020-01-01 RX ADMIN — PROPOFOL 25 MCG/KG/MIN: 10 INJECTION, EMULSION INTRAVENOUS at 11:00

## 2020-01-01 RX ADMIN — Medication 50 MCG/HR: at 16:43

## 2020-01-01 RX ADMIN — HYDRALAZINE HYDROCHLORIDE 20 MG: 20 INJECTION, SOLUTION INTRAMUSCULAR; INTRAVENOUS at 21:38

## 2020-01-01 RX ADMIN — DEXAMETHASONE SODIUM PHOSPHATE 6 MG: 4 INJECTION, SOLUTION INTRAMUSCULAR; INTRAVENOUS at 08:01

## 2020-01-01 RX ADMIN — DEXTROSE MONOHYDRATE AND SODIUM CHLORIDE 50 ML/HR: 5; .9 INJECTION, SOLUTION INTRAVENOUS at 17:45

## 2020-01-01 RX ADMIN — Medication 150 MCG/HR: at 19:21

## 2020-01-01 RX ADMIN — AZITHROMYCIN 500 MG: 500 INJECTION, POWDER, LYOPHILIZED, FOR SOLUTION INTRAVENOUS at 20:15

## 2020-01-01 RX ADMIN — FAMOTIDINE 20 MG: 10 INJECTION INTRAVENOUS at 10:20

## 2020-01-01 RX ADMIN — METOCLOPRAMIDE HYDROCHLORIDE 10 MG: 5 INJECTION INTRAMUSCULAR; INTRAVENOUS at 04:45

## 2020-01-01 RX ADMIN — METOCLOPRAMIDE HYDROCHLORIDE 10 MG: 5 INJECTION INTRAMUSCULAR; INTRAVENOUS at 16:28

## 2020-01-01 RX ADMIN — PROPOFOL 30 MCG/KG/MIN: 10 INJECTION, EMULSION INTRAVENOUS at 11:42

## 2020-01-01 RX ADMIN — Medication 20 ML: at 21:17

## 2020-01-01 RX ADMIN — ENOXAPARIN SODIUM 30 MG: 30 INJECTION SUBCUTANEOUS at 23:41

## 2020-01-01 RX ADMIN — Medication 10 ML: at 05:23

## 2020-01-01 RX ADMIN — DEXAMETHASONE SODIUM PHOSPHATE 6 MG: 4 INJECTION, SOLUTION INTRAMUSCULAR; INTRAVENOUS at 08:48

## 2020-01-01 RX ADMIN — Medication 10 ML: at 13:05

## 2020-01-01 RX ADMIN — DEXTROSE MONOHYDRATE AND SODIUM CHLORIDE 50 ML/HR: 5; .9 INJECTION, SOLUTION INTRAVENOUS at 06:02

## 2020-01-01 RX ADMIN — MORPHINE SULFATE 5 MG: 10 INJECTION, SOLUTION INTRAMUSCULAR; INTRAVENOUS at 06:09

## 2020-01-01 RX ADMIN — INSULIN HUMAN 10 UNITS: 100 INJECTION, SOLUTION PARENTERAL at 05:52

## 2020-01-01 RX ADMIN — METOCLOPRAMIDE HYDROCHLORIDE 10 MG: 5 INJECTION INTRAMUSCULAR; INTRAVENOUS at 17:19

## 2020-01-01 RX ADMIN — DEXMEDETOMIDINE HYDROCHLORIDE 1.1 MCG/KG/HR: 100 INJECTION, SOLUTION INTRAVENOUS at 11:32

## 2020-01-01 RX ADMIN — HEPARIN SODIUM 12 UNITS/KG/HR: 5000 INJECTION, SOLUTION INTRAVENOUS at 12:20

## 2020-01-01 RX ADMIN — REMDESIVIR 100 MG: 100 INJECTION, POWDER, LYOPHILIZED, FOR SOLUTION INTRAVENOUS at 10:52

## 2020-01-01 RX ADMIN — WATER 50 NG/KG/MIN: 1 SOLUTION INTRAVENOUS at 09:15

## 2020-01-01 RX ADMIN — Medication 30 ML: at 16:28

## 2020-01-01 RX ADMIN — WATER 50 NG/KG/MIN: 1 SOLUTION INTRAVENOUS at 02:39

## 2020-01-01 RX ADMIN — ENOXAPARIN SODIUM 30 MG: 30 INJECTION SUBCUTANEOUS at 21:51

## 2020-01-01 RX ADMIN — PROPOFOL 30 MCG/KG/MIN: 10 INJECTION, EMULSION INTRAVENOUS at 19:21

## 2020-01-01 RX ADMIN — EPINEPHRINE 10 MCG/MIN: 1 INJECTION INTRAMUSCULAR; INTRAVENOUS; SUBCUTANEOUS at 12:00

## 2020-01-01 RX ADMIN — INSULIN LISPRO 2 UNITS: 100 INJECTION, SOLUTION INTRAVENOUS; SUBCUTANEOUS at 12:00

## 2020-01-01 RX ADMIN — NOREPINEPHRINE-DEXTROSE IV SOLUTION 4 MG/250ML-5% 4 MCG/MIN: 4-5/250 SOLUTION at 06:43

## 2020-01-01 RX ADMIN — Medication 10 ML: at 05:35

## 2020-01-01 RX ADMIN — REMDESIVIR 100 MG: 100 INJECTION, POWDER, LYOPHILIZED, FOR SOLUTION INTRAVENOUS at 10:14

## 2020-01-01 RX ADMIN — NOREPINEPHRINE-DEXTROSE IV SOLUTION 4 MG/250ML-5% 30 MCG/MIN: 4-5/250 SOLUTION at 06:47

## 2020-01-01 RX ADMIN — HUMAN INSULIN 2 UNITS: 100 INJECTION, SOLUTION SUBCUTANEOUS at 07:30

## 2020-01-01 RX ADMIN — Medication 50 MCG/HR: at 09:38

## 2020-01-01 RX ADMIN — METOCLOPRAMIDE HYDROCHLORIDE 10 MG: 5 INJECTION INTRAMUSCULAR; INTRAVENOUS at 10:00

## 2020-01-01 RX ADMIN — ENOXAPARIN SODIUM 30 MG: 30 INJECTION SUBCUTANEOUS at 22:40

## 2020-01-01 RX ADMIN — WATER 50 NG/KG/MIN: 1 SOLUTION INTRAVENOUS at 15:37

## 2020-01-01 RX ADMIN — PROPOFOL 20 MCG/KG/MIN: 10 INJECTION, EMULSION INTRAVENOUS at 21:12

## 2020-01-01 RX ADMIN — DEXTROSE MONOHYDRATE 25 G: 25 INJECTION, SOLUTION INTRAVENOUS at 05:52

## 2020-01-01 RX ADMIN — FAMOTIDINE 20 MG: 10 INJECTION INTRAVENOUS at 09:16

## 2020-01-01 RX ADMIN — ENOXAPARIN SODIUM 30 MG: 30 INJECTION SUBCUTANEOUS at 10:14

## 2020-01-01 RX ADMIN — ENOXAPARIN SODIUM 30 MG: 30 INJECTION SUBCUTANEOUS at 00:12

## 2020-01-01 RX ADMIN — PANTOPRAZOLE SODIUM 40 MG: 40 INJECTION, POWDER, FOR SOLUTION INTRAVENOUS at 05:51

## 2020-01-01 RX ADMIN — Medication 10 ML: at 21:03

## 2020-01-01 RX ADMIN — SODIUM BICARBONATE 50 MEQ: 84 INJECTION, SOLUTION INTRAVENOUS at 11:55

## 2020-01-01 RX ADMIN — SODIUM BICARBONATE 50 MEQ: 84 INJECTION, SOLUTION INTRAVENOUS at 11:59

## 2020-01-01 RX ADMIN — PROPOFOL 30 MCG/KG/MIN: 10 INJECTION, EMULSION INTRAVENOUS at 14:36

## 2020-01-01 RX ADMIN — DEXMEDETOMIDINE HYDROCHLORIDE 1.5 MCG/KG/HR: 100 INJECTION, SOLUTION INTRAVENOUS at 04:25

## 2020-01-01 RX ADMIN — PROPOFOL 30 MCG/KG/MIN: 10 INJECTION, EMULSION INTRAVENOUS at 21:50

## 2020-01-01 RX ADMIN — PROPOFOL 20 MCG/KG/MIN: 10 INJECTION, EMULSION INTRAVENOUS at 01:22

## 2020-01-01 RX ADMIN — PROPOFOL 20 MCG/KG/MIN: 10 INJECTION, EMULSION INTRAVENOUS at 16:29

## 2020-01-01 RX ADMIN — Medication 90 MG: at 09:35

## 2020-01-01 RX ADMIN — AZITHROMYCIN 500 MG: 500 INJECTION, POWDER, LYOPHILIZED, FOR SOLUTION INTRAVENOUS at 21:02

## 2020-01-01 RX ADMIN — WATER 20 NG/KG/MIN: 1 SOLUTION INTRAVENOUS at 11:02

## 2020-01-01 RX ADMIN — MORPHINE SULFATE 5 MG: 10 INJECTION, SOLUTION INTRAMUSCULAR; INTRAVENOUS at 04:55

## 2020-01-01 RX ADMIN — Medication 50 MCG/HR: at 14:35

## 2020-01-01 RX ADMIN — SODIUM CHLORIDE 0.5 MCG/KG/MIN: 9 INJECTION, SOLUTION INTRAVENOUS at 22:41

## 2020-01-01 RX ADMIN — METOCLOPRAMIDE HYDROCHLORIDE 10 MG: 5 INJECTION INTRAMUSCULAR; INTRAVENOUS at 04:17

## 2020-01-01 RX ADMIN — NOREPINEPHRINE-DEXTROSE IV SOLUTION 4 MG/250ML-5% 20 MCG/MIN: 4-5/250 SOLUTION at 00:21

## 2020-01-01 RX ADMIN — NOREPINEPHRINE-DEXTROSE IV SOLUTION 4 MG/250ML-5% 30 MCG/MIN: 4-5/250 SOLUTION at 03:35

## 2020-01-01 RX ADMIN — METOCLOPRAMIDE HYDROCHLORIDE 10 MG: 5 INJECTION INTRAMUSCULAR; INTRAVENOUS at 04:20

## 2020-01-01 RX ADMIN — CEFTRIAXONE SODIUM 1 G: 1 INJECTION, POWDER, FOR SOLUTION INTRAMUSCULAR; INTRAVENOUS at 08:45

## 2020-01-01 RX ADMIN — ENOXAPARIN SODIUM 30 MG: 30 INJECTION SUBCUTANEOUS at 11:34

## 2020-01-01 RX ADMIN — DEXMEDETOMIDINE HYDROCHLORIDE 0.3 MCG/KG/HR: 100 INJECTION, SOLUTION INTRAVENOUS at 21:48

## 2020-01-01 RX ADMIN — Medication 20 ML: at 21:07

## 2020-01-01 RX ADMIN — DEXTROSE MONOHYDRATE AND SODIUM CHLORIDE 50 ML/HR: 5; .9 INJECTION, SOLUTION INTRAVENOUS at 20:22

## 2020-01-01 RX ADMIN — ATRACURIUM BESYLATE 39.4 MG: 10 INJECTION, SOLUTION INTRAVENOUS at 10:38

## 2020-01-01 RX ADMIN — MORPHINE SULFATE 5 MG: 10 INJECTION, SOLUTION INTRAMUSCULAR; INTRAVENOUS at 21:07

## 2020-01-01 RX ADMIN — DEXMEDETOMIDINE HYDROCHLORIDE 1.5 MCG/KG/HR: 100 INJECTION, SOLUTION INTRAVENOUS at 11:12

## 2020-01-01 RX ADMIN — ETOMIDATE 20 MG: 2 INJECTION INTRAVENOUS at 09:34

## 2020-01-01 RX ADMIN — METOCLOPRAMIDE HYDROCHLORIDE 10 MG: 5 INJECTION INTRAMUSCULAR; INTRAVENOUS at 09:54

## 2020-01-01 RX ADMIN — NOREPINEPHRINE-DEXTROSE IV SOLUTION 4 MG/250ML-5% 25 MCG/MIN: 4-5/250 SOLUTION at 00:31

## 2020-01-01 RX ADMIN — SODIUM CHLORIDE 4 MCG/KG/MIN: 900 INJECTION, SOLUTION INTRAVENOUS at 04:45

## 2020-01-01 RX ADMIN — METOCLOPRAMIDE HYDROCHLORIDE 10 MG: 5 INJECTION INTRAMUSCULAR; INTRAVENOUS at 03:55

## 2020-01-01 RX ADMIN — HUMAN INSULIN 2 UNITS: 100 INJECTION, SOLUTION SUBCUTANEOUS at 16:30

## 2020-01-01 RX ADMIN — INSULIN LISPRO 2 UNITS: 100 INJECTION, SOLUTION INTRAVENOUS; SUBCUTANEOUS at 18:00

## 2020-01-01 RX ADMIN — SODIUM CHLORIDE 125 ML/HR: 900 INJECTION, SOLUTION INTRAVENOUS at 03:55

## 2020-01-01 RX ADMIN — FAMOTIDINE 20 MG: 10 INJECTION INTRAVENOUS at 09:48

## 2020-01-01 RX ADMIN — SODIUM BICARBONATE 50 MEQ: 84 INJECTION, SOLUTION INTRAVENOUS at 12:07

## 2020-01-01 RX ADMIN — Medication 40 ML: at 04:46

## 2020-01-01 RX ADMIN — WATER 40 NG/KG/MIN: 1 SOLUTION INTRAVENOUS at 04:52

## 2020-01-01 RX ADMIN — SODIUM CHLORIDE 125 ML/HR: 900 INJECTION, SOLUTION INTRAVENOUS at 19:21

## 2020-01-01 RX ADMIN — ENOXAPARIN SODIUM 30 MG: 30 INJECTION SUBCUTANEOUS at 11:51

## 2020-11-27 NOTE — ED TRIAGE NOTES
Pt moved to triage in wheelchair. Pt is wearing a mask. Pt accompanied by his son. Pt's son reports he went to the doctor for the first time in about 30 years. Pt's son reports pt hasn't been able to walk for the past 2 weeks and has been falling. Pt has appointment with Neuro on 20. Pt's son reports today pt has been confused, not sleeping, not eating or drinking right. Pt is able to state his name,  and place. Pt is not able to state month or year. Pt is slow to speak but can follow commands. NIH 0. Pt's son states, Maris Charles still have some more tests to do, but they think he might be in the early stages of Parkinson's disease. \" Pt's son reports pt has been urinating a lot and had increased thirst. Pt's BGL in triage 95.

## 2020-11-28 NOTE — ED NOTES
I have reviewed discharge instructions with the patient. The patient verbalized understanding. Patient left ED via Discharge Method: wheelchair to Home with son Opportunity for questions and clarification provided. Patient given 0 scripts. To continue your aftercare when you leave the hospital, you may receive an automated call from our care team to check in on how you are doing. This is a free service and part of our promise to provide the best care and service to meet your aftercare needs.  If you have questions, or wish to unsubscribe from this service please call [phone]. Thank you for Choosing our [facility] Emergency Department.

## 2020-11-28 NOTE — ED PROVIDER NOTES
70-year-old gentleman presents with concerns about difficulty walking. He says that he went to the doctor for the first time in 30 years a couple of months ago because of the symptoms. Family says that since then is gotten significantly worse and he had several episodes where he has fallen. Episode outside where he could not get his legs to walk and he fell. An episode where he was in the bathtub and was unable to get out of the tub because he could not get his legs to move properly. He denies any current arm symptoms. He has had no speech or language problems. As any headache, back pain, bowel or bladder problems, weakness, or numbness. He has an appointment with a neurologist in about 10 days. Says he has not had any brain imaging. He has no known medical problems and does not take any medications. Elements of this note were created using speech recognition software. As such, errors of speech recognition may be present. No past medical history on file. Past Surgical History:  
Procedure Laterality Date  HX APPENDECTOMY Family History:  
Problem Relation Age of Onset  COPD Father  Other Sister Social History Socioeconomic History  Marital status:  Spouse name: Not on file  Number of children: Not on file  Years of education: Not on file  Highest education level: Not on file Occupational History  Not on file Social Needs  Financial resource strain: Not on file  Food insecurity Worry: Not on file Inability: Not on file  Transportation needs Medical: Not on file Non-medical: Not on file Tobacco Use  Smoking status: Former Smoker Last attempt to quit: 10/8/2007 Years since quittin.1  Smokeless tobacco: Never Used Substance and Sexual Activity  Alcohol use: Yes Comment: Occasionally  Drug use: Not Currently  Sexual activity: Not on file Lifestyle  Physical activity Days per week: Not on file Minutes per session: Not on file  Stress: Not on file Relationships  Social connections Talks on phone: Not on file Gets together: Not on file Attends [employer] service: Not on file Active member of club or organization: Not on file Attends meetings of clubs or organizations: Not on file Relationship status: Not on file  Intimate partner violence Fear of current or ex partner: Not on file Emotionally abused: Not on file Physically abused: Not on file Forced sexual activity: Not on file Other Topics Concern  Not on file Social History Narrative  Not on file ALLERGIES: Patient has no known allergies. Review of Systems Constitutional: Negative for chills, diaphoresis and fever. HENT: Negative for congestion, rhinorrhea and sore throat. Eyes: Negative for redness and visual disturbance. Respiratory: Negative for cough, chest tightness, shortness of breath and wheezing. Cardiovascular: Negative for chest pain and palpitations. Gastrointestinal: Negative for abdominal pain, blood in stool, diarrhea, nausea and vomiting. Endocrine: Negative for polydipsia and polyuria. Genitourinary: Negative for dysuria and hematuria. Musculoskeletal: Negative for arthralgias, myalgias and neck stiffness. Skin: Negative for rash. Allergic/Immunologic: Negative for environmental allergies and food allergies. Neurological: Negative for dizziness, weakness and headaches. Difficulty walking Hematological: Negative for adenopathy. Does not bruise/bleed easily. Psychiatric/Behavioral: Negative for confusion and sleep disturbance. The patient is not nervous/anxious. Vitals:  
 11/27/20 1859 BP: 126/78 Pulse: 87 Resp: 18 Temp: 99.6 °F (37.6 °C) SpO2: 94% Weight: 97.5 kg (215 lb) Height: 6' (1.829 m) Physical Exam 
 Vitals signs and nursing note reviewed. Constitutional:   
   General: He is not in acute distress. Appearance: He is well-developed. He is not toxic-appearing. HENT:  
   Head: Normocephalic and atraumatic. Eyes:  
   General: No scleral icterus. Right eye: No discharge. Left eye: No discharge. Conjunctiva/sclera: Conjunctivae normal.  
   Pupils: Pupils are equal, round, and reactive to light. Neck: Musculoskeletal: Normal range of motion. No neck rigidity. Cardiovascular:  
   Rate and Rhythm: Normal rate and regular rhythm. Heart sounds: Normal heart sounds. Pulmonary:  
   Effort: Pulmonary effort is normal. No respiratory distress. Breath sounds: Normal breath sounds. No wheezing or rales. Chest:  
   Chest wall: No tenderness. Abdominal:  
   General: Bowel sounds are normal. There is no distension. Palpations: Abdomen is soft. Tenderness: There is no guarding or rebound. Musculoskeletal: Normal range of motion. General: No tenderness. Lymphadenopathy:  
   Cervical: No cervical adenopathy. Skin: 
   General: Skin is warm and dry. Neurological:  
   General: No focal deficit present. Mental Status: He is alert and oriented to person, place, and time. Comments: Patient has 5 out of 5 strength in all 4 extremities. He does not appear to have any coordination abnormalities. He has no pronator drift. Have any pill-rolling movements nor does he have any facial difficulties. Psychiatric:     
   Mood and Affect: Mood normal.     
   Behavior: Behavior normal.  
 
  
 
MDM Number of Diagnoses or Management Options Diagnosis management comments: We will get a head CT scan and check his basic blood work. ED Course as of Nov 27 2145 Fri Nov 27, 2020 2139 CT shows no acute abnormalities. Given his symptoms I did discuss with him the possibility of NPH.   He has follow-up with a neurologist already. I do not think he is anything urgent that he needs to be admitted to the hospital for and I will discharge him home.  
 [AC] ED Course User Index [AC] Evelyne Garza MD  
 
 
Procedures

## 2020-11-28 NOTE — DISCHARGE INSTRUCTIONS
As we discussed, we did not have the exact answer for your symptoms today in the emergency department. We discussed the possibility of a diagnosis of normal pressure hydrocephalus (NPH). Therefore, it is important for you to continue to follow-up as planned with the neurologist for further evaluation. You need to be extra careful when you are starting and initiating movements and you may want to consider either using a walker or ensuring that you have someone who can assist you to prevent falls that could lead to significant injury. Please return with any fevers, vomiting, difficulty breathing, speech problems, worsening symptoms, or additional concerns.

## 2020-12-02 PROBLEM — J96.01 ACUTE HYPOXEMIC RESPIRATORY FAILURE DUE TO COVID-19 (HCC): Status: ACTIVE | Noted: 2020-01-01

## 2020-12-02 PROBLEM — J90 PLEURAL EFFUSION: Status: ACTIVE | Noted: 2020-01-01

## 2020-12-02 PROBLEM — J96.01 ACUTE RESPIRATORY FAILURE WITH HYPOXIA (HCC): Status: ACTIVE | Noted: 2020-01-01

## 2020-12-02 PROBLEM — J80 ACUTE RESPIRATORY DISTRESS SYNDROME (ARDS) DUE TO COVID-19 VIRUS (HCC): Status: ACTIVE | Noted: 2020-01-01

## 2020-12-02 PROBLEM — U07.1 ACUTE RESPIRATORY DISTRESS SYNDROME (ARDS) DUE TO COVID-19 VIRUS (HCC): Status: ACTIVE | Noted: 2020-01-01

## 2020-12-02 PROBLEM — U07.1 COVID-19: Status: ACTIVE | Noted: 2020-01-01

## 2020-12-02 PROBLEM — U07.1 ACUTE HYPOXEMIC RESPIRATORY FAILURE DUE TO COVID-19 (HCC): Status: ACTIVE | Noted: 2020-01-01

## 2020-12-02 NOTE — ED PROVIDER NOTES
[facility]       HPI:    69-year-old male presents to the ED in respiratory distress. Patient states he has had ongoing issues with generalized weakness for over a month. He started having shortness of breath today. No cough. No chest pain. No known history of CAD CHF VHD or cardiac arrhythmias. No known history of asthma or COPD. Patient is a former smoker who quit in . There was report that she was having weakness in his lower legs. No recent GI illness nausea vomiting or diarrhea. No known history of Guillain-Barré. Denies abdominal pain. No prior history of venous thromboembolism. No hemoptysis. No lower extremity edema. No recent surgery travel or immobilization. No known Covid exposures. Room air saturations were 54% with a good Pleth on the monitor. He was immediately placed on nasal cannula with improvement of sats to 89% and then he was subsequently placed on nonrebreather. REVIEW OF SYSTEMS     ROS Negative Except as Listed in HPI    PAST MEDICAL HISTORY     No past medical history on file. Past Surgical History:   Procedure Laterality Date    HX APPENDECTOMY       Social History     Socioeconomic History    Marital status:      Spouse name: Not on file    Number of children: Not on file    Years of education: Not on file    Highest education level: Not on file   Tobacco Use    Smoking status: Former Smoker     Last attempt to quit: 10/8/2007     Years since quittin.1    Smokeless tobacco: Never Used   Substance and Sexual Activity    Alcohol use: Yes     Comment: Occasionally    Drug use: Not Currently     None     No Known Allergies     PHYSICAL EXAM       Vitals:    20 1401 20 1402 20 1411 20 1431   BP: 103/70   103/67   Pulse:       Resp:       Temp:       SpO2:  92% 98% 99%   I have ordered type and screen in case the patient gets convalescent serum. Vital signs were reviewed.      Physical Exam  Vitals signs and nursing note reviewed. Constitutional:       Comments: Ill-appearing   HENT:      Mouth/Throat:      Mouth: Mucous membranes are moist.   Eyes:      General: No scleral icterus. Neck:      Vascular: No JVD. Cardiovascular:      Rate and Rhythm: Regular rhythm. Tachycardia present. Pulmonary:      Comments: Labored breathing. Coarse rales bilaterally  Abdominal:      Palpations: Abdomen is soft. Tenderness: There is no abdominal tenderness. Musculoskeletal:      Comments: No lower extremity edema. No palpable cords in lower extremities. Skin:     General: Skin is warm and dry. Neurological:      Comments: Awake, alert. GCS 15. CN II-XII grossly intact. Speech clear. No gross lateralizing neuro deficits. Psychiatric:         Behavior: Behavior normal.          MEDICAL DECISION MAKING       Initial Impression and Treatment Plan  Ill-appearing 70-year-old male with hypoxemic respiratory failure. He has rales bilaterally although he has no lower extremity edema or JVD. He is improved on a nonrebreather and is satting 100%. No complaints of chest pain. Regarding his reported lower extremity weakness. He has no drift in the lower extremities on exam.  DTRs are 1+ bilaterally. Guillain-Barré seems less likely and I do not think he needs an emergent LP at this point. Plan for labs, EKG, chest x-ray, BiPAP, close observation of respiratory status, admission. We will give a dose of empiric cefepime for possible Pneumonia    EKG performed at 1417. Normal sinus rhythm at 66. Normal axis. No ST elevations or depressions. No significant T wave abnormalities.   Intervals normal.    Recent Results (from the past 8 hour(s))   CBC WITH AUTOMATED DIFF    Collection Time: 12/02/20  1:48 PM   Result Value Ref Range    WBC 7.3 4.3 - 11.1 K/uL    RBC 4.58 4.23 - 5.6 M/uL    HGB 13.6 13.6 - 17.2 g/dL    HCT 41.0 (L) 41.1 - 50.3 %    MCV 89.5 79.6 - 97.8 FL    MCH 29.7 26.1 - 32.9 PG MCHC 33.2 31.4 - 35.0 g/dL    RDW 14.3 11.9 - 14.6 %    PLATELET 819 782 - 556 K/uL    MPV 11.0 9.4 - 12.3 FL    ABSOLUTE NRBC 0.00 0.0 - 0.2 K/uL    DF AUTOMATED      NEUTROPHILS 83 (H) 43 - 78 %    LYMPHOCYTES 12 (L) 13 - 44 %    MONOCYTES 4 4.0 - 12.0 %    EOSINOPHILS 0 (L) 0.5 - 7.8 %    BASOPHILS 0 0.0 - 2.0 %    IMMATURE GRANULOCYTES 0 0.0 - 5.0 %    ABS. NEUTROPHILS 6.0 1.7 - 8.2 K/UL    ABS. LYMPHOCYTES 0.9 0.5 - 4.6 K/UL    ABS. MONOCYTES 0.3 0.1 - 1.3 K/UL    ABS. EOSINOPHILS 0.0 0.0 - 0.8 K/UL    ABS. BASOPHILS 0.0 0.0 - 0.2 K/UL    ABS. IMM. GRANS. 0.0 0.0 - 0.5 K/UL   METABOLIC PANEL, COMPREHENSIVE    Collection Time: 12/02/20  1:48 PM   Result Value Ref Range    Sodium 141 136 - 145 mmol/L    Potassium 4.3 3.5 - 5.1 mmol/L    Chloride 107 98 - 107 mmol/L    CO2 29 21 - 32 mmol/L    Anion gap 5 (L) 7 - 16 mmol/L    Glucose 109 (H) 65 - 100 mg/dL    BUN 33 (H) 8 - 23 MG/DL    Creatinine 1.03 0.8 - 1.5 MG/DL    GFR est AA >60 >60 ml/min/1.73m2    GFR est non-AA >60 >60 ml/min/1.73m2    Calcium 8.6 8.3 - 10.4 MG/DL    Bilirubin, total 0.6 0.2 - 1.1 MG/DL    ALT (SGPT) 30 12 - 65 U/L    AST (SGOT) 75 (H) 15 - 37 U/L    Alk.  phosphatase 61 50 - 136 U/L    Protein, total 7.0 6.3 - 8.2 g/dL    Albumin 2.8 (L) 3.2 - 4.6 g/dL    Globulin 4.2 (H) 2.3 - 3.5 g/dL    A-G Ratio 0.7 (L) 1.2 - 3.5     LACTIC ACID    Collection Time: 12/02/20  1:48 PM   Result Value Ref Range    Lactic acid 1.3 0.4 - 2.0 MMOL/L   PROCALCITONIN    Collection Time: 12/02/20  1:48 PM   Result Value Ref Range    Procalcitonin 0.28 ng/mL   POC G3    Collection Time: 12/02/20  1:53 PM   Result Value Ref Range    Device: Non rebreather      pH (POC) 7.46 (H) 7.35 - 7.45      pCO2 (POC) 31.6 (L) 35 - 45 MMHG    pO2 (POC) 53 (L) 75 - 100 MMHG    HCO3 (POC) 22.3 22 - 26 MMOL/L    sO2 (POC) 89 (L) 95 - 98 %    Base deficit (POC) 1 mmol/L    Allens test (POC) YES      Site LEFT RADIAL      Specimen type (POC) ARTERIAL      Performed by [email or web address]     CO2, POC 23 MMOL/L    Flow rate (POC) 15.000 L/min    COLLECT TIME 145     SARS-COV-2    Collection Time: 12/02/20  2:46 PM   Result Value Ref Range    Specimen source Nasopharyngeal      COVID-19 rapid test Detected (AA) NOTD           Xr Chest Port    Result Date: 12/2/2020  Clinical History: The Male patient is 79years old  presenting with symptoms of hypoxia. Comparison:  none Findings:  Frontal view of the chest was obtained. There is diffuse mild pulmonary vascular congestion. No pleural effusions are demonstrated. The cardiomediastinal silhouette is upper limits of normal in size. There are no acute osseous abnormalities. Impression:  1. Diffuse mild pulmonary vascular congestion.  CPT code(s) 63991         Medications   dexamethasone (DECADRON) 10 mg/mL injection 6 mg (has no administration in time range)   cefepime (MAXIPIME) 1 g in 0.9% sodium chloride (MBP/ADV) 50 mL MBP (1 g IntraVENous New Bag 12/2/20 1440)           Critical Care  Performed by: Jenn Soliz MD  Authorized by: Jenn Soliz MD     Critical care provider statement:     Critical care time (minutes):  35    Critical care was necessary to treat or prevent imminent or life-threatening deterioration of the following conditions:  Respiratory failure    Critical care was time spent personally by me on the following activities:  Blood draw for specimens, development of treatment plan with patient or surrogate, discussions with consultants, evaluation of patient's response to treatment, interpretation of cardiac output measurements, obtaining history from patient or surrogate, ordering and performing treatments and interventions, ordering and review of laboratory studies, ordering and review of radiographic studies, pulse oximetry and re-evaluation of patient's condition    I assumed direction of critical care for this patient from another provider in my specialty: no          Update Notes  3:19 PM-I reevaluated the patient. He is Covid positive. Notified him of results. He is on BiPAP right now with improved work of breathing. Saturations 90%. Patient specifically denies any chest pain at this point. Discussed case with intensivist.  Either he or another intensivist will be down to evaluate the patient. I have ordered a type and screen in case the patient needs convalescent serum. IV Decadron ordered. Disposition:    Admit. Diagnosis:     ICD-10-CM ICD-9-CM   1. Acute respiratory failure with hypoxia (HCC)  J96.01 518.81   2. Pneumonia due to COVID-19 virus  U07.1 480.8    J12.89          __________________________________________________________      Please note, this chart was dictated using Dragon dictation, voice recognition software. While efforts were made to correct any transcription errors, some may inadvertently remain. Please forgive punctuation and typographic/voice recognition errors. Please contact me with any questions concerns or for clarification of documentation.

## 2020-12-02 NOTE — ED TRIAGE NOTES
EMS called to home by home health for tachypnea. EMS found pt to be cyanotic lips and 54% on room air. Pt placed on non rebreather sat now 89%.

## 2020-12-02 NOTE — PROCEDURES
US Guided Thoracentesis Procedure Note--UNILATERAL    Time Out -- Correct patient identified and Everyone Agrees. For procedure sterile techniques used including: Sterile gown, gloves, cap, mask, drapes and chlorhexidine to the insertions site/location. Procedure:   Left Thoracentesis with ultrasound guidance    Indication:   Left Pleural effusion     Summary:    After informed consent was obtained, the patient was positioned upright in the usual fashion.  Ultrasound was used to identify the optimal spot for pleural drainage.     The  left  intercostal space was anesthetized with 1% Lidocaine to achieve adequate anesthesia.  The pleural space was entered with cloudy yellow fluid identified.  Lidocaine was instilled within the pleural space as well.  A small stab incision was made at the site of local anesthesia and the thoracentesis catheter was advanced into the pleural space. Approximately 450 ml of fluid was obtained with ease.  The procedure was terminated after pleural drainage stopped. Air was not aspirated during the procedure.  A bandaid was placed over the incision after adequate hemostasis was achieved.  A CXR  is not pending. CXR done and noted normal M-mode showing \"sea-shore\" sign    Did check on Right side and no significant effusion to drain.      EBL --  2 drops    Patient stable post procedure    The pleural fluid will be sent for :protein, LDH, cytology, cell count, AFB, and deaminase.      Signed By: Jamir Vieira MD Private car

## 2020-12-02 NOTE — H&P
HISTORY AND PHYSICAL    [facility]    12/2/2020    Date of Admission:  12/2/2020    The patient's chart is reviewed and the patient is discussed with the staff. Subjective:     Patient is a 79 y.o.  male presents with acute hypoxemia respiratory  failure secondary to COVID-19. The patient arrived via EMS from home with reports on acute onset of dyspnea. He was recently in the ED on 11/27/2020 secondary to gait instability and subsequent fall. He is currently being worked up for Proviation. Upon arrival to the Ed, the patient's SPO2 levels were in the 50's on RA. He was placed on oxygent therapy with escalation to BiPAP. Reports no known COVID exposure. The patient has minimal medical history because he does not routinely see a PCP and only had has first office appointment on 10/8/2020 with Dr. Lucio Lay due to neurological changes. He is a previous smoker but quit in 2007. The patient's son is the primary historian. The family wasn't aware that the patient could have COVID. The son suggested that the patient was having some dyspnea x 1 week. Also suggested he has been bedridden for nearly a week due to neurological changes. The rapid COVID-19 test was completed in the ED and was positive. Lactic acid level is 1.3, procal 0.28, WBC 7.3. Home [employer] company n/a. He was not on oxygen therapy prior to ED visit. Review of Systems  A comprehensive review of systems was negative except for that written in the HPI. Patient Active Problem List   Diagnosis Code    Acute respiratory failure with hypoxia ([facility]) J96.01    COVID-19 U07.1    Acute hypoxemic respiratory failure due to COVID-19 (HCC) U07.1, J96.01    Acute respiratory distress syndrome (ARDS) due to COVID-19 virus (HCC) U07.1, J80     None       No past medical history on file.   Past Surgical History:   Procedure Laterality Date    HX APPENDECTOMY       Social History     Socioeconomic History    Marital status:      Spouse name: Not on file    Number of children: Not on file    Years of education: Not on file    Highest education level: Not on file   Occupational History    Not on file   Social Needs    Financial resource strain: Not on file    Food insecurity     Worry: Not on file     Inability: Not on file    Transportation needs     Medical: Not on file     Non-medical: Not on file   Tobacco Use    Smoking status: Former Smoker     Last attempt to quit: 10/8/2007     Years since quittin.1    Smokeless tobacco: Never Used   Substance and Sexual Activity    Alcohol use: Yes     Comment: Occasionally    Drug use: Not Currently    Sexual activity: Not on file   Lifestyle    Physical activity     Days per week: Not on file     Minutes per session: Not on file    Stress: Not on file   Relationships    Social connections     Talks on phone: Not on file     Gets together: Not on file     Attends Anabaptist service: Not on file     Active member of club or organization: Not on file     Attends meetings of clubs or organizations: Not on file     Relationship status: Not on file    Intimate partner violence     Fear of current or ex partner: Not on file     Emotionally abused: Not on file     Physically abused: Not on file     Forced sexual activity: Not on file   Other Topics Concern    Not on file   Social History Narrative    Not on file     Family History   Problem Relation Age of Onset    COPD Father     Other Sister      No Known Allergies    No current facility-administered medications for this encounter. No current outpatient medications on file.        Objective:     Vitals:    20 1402 20 1411 20 1431 20 1448   BP:   103/67    Pulse:       Resp:       Temp:       SpO2: 92% 98% 99% 93%       PHYSICAL EXAM     Constitutional:  the patient is well developed and currently on BiPAP in no acute distress  EENMT:  Sclera clear, pupils equal, oral mucosa moist  Respiratory: scattered crackles  Cardiovascular:  RRR without M,G,R  Gastrointestinal: soft and non-tender; with positive bowel sounds. Musculoskeletal: warm without cyanosis. There is lower extremity edema. Skin:  no jaundice or rashes, wounds   Neurologic: no gross neuro deficits     Psychiatric:  alert and oriented x 3    CXR:     Imaging: There is diffuse mild pulmonary vascular congestion. No pleural effusions are demonstrated.  The cardiomediastinal silhouette is upper limits of normal in  size.  There are no acute osseous abnormalities.     Impression:       1. Diffuse mild pulmonary vascular congestion        Recent Labs     12/02/20  1348   WBC 7.3   HGB 13.6   HCT 41.0*        Recent Labs     12/02/20  1348      K 4.3      *   CO2 29   BUN 33*   CREA 1.03   CA 8.6   ALB 2.8*   TBILI 0.6   ALT 30     Recent Labs     12/02/20  1353   PHI 7.46*   PCO2I 31.6*   PO2I 53*   HCO3I 22.3     Recent Labs     12/02/20  1348   LAC 1.3       Assessment:  (Medical Decision Making)     Hospital Problems  Date Reviewed: 10/9/2020          Codes Class Noted POA    Acute respiratory failure with hypoxia [facility]) ICD-10-CM: J96.01  ICD-9-CM: 518.81  12/2/2020 Unknown        COVID-19 ICD-10-CM: U07.1  ICD-9-CM: 079.89  12/2/2020 Unknown        Acute hypoxemic respiratory failure due to COVID-19 [facility]) ICD-10-CM: U07.1, J96.01  ICD-9-CM: 518.81, 079.89, 799.02  12/2/2020 Unknown        Acute respiratory distress syndrome (ARDS) due to COVID-19 virus [facility]) ICD-10-CM: U07.1, J80  ICD-9-CM: 518.82, 079.89  12/2/2020 Unknown              Plan:  (Medical Decision Making)     --Will admit to ICU since on BIPAP  --Supplemental O2   --Respiratory nebulizer treatments  --culture  --steroid therapy  --antibiotic therapy    More than 50% of the time documented was spent in face-to-face contact with the patient and in the care of the patient on the floor/unit where the patient is located.     Katya Appiah, NP    Lungs : few crackles on BIPAP At 10/6 and 70%  Heart S1 and S2 audible, no murmers or rubs appreciated  Other     Tolerating BIPAP well. Will need BIPAP and ICU admission to [facility] unit  Given dexa in ER and continue  Ordered convulsant plasma  ID consult for remdesivir  Started abx in ER and change to rocephin/azithro    Adeel Buys informed family of diagnosis and acuity. Family to get tested. I have spoken with and examined the patient. I have reviewed the history, examination, assessment, and plan and agree with the above. Coby Joe MD      This note was signed electronically. Errors are unfortunately her likely due to dictation software.

## 2020-12-03 NOTE — ED NOTES
TRANSFER - OUT REPORT:    Verbal report given to Elizabeth Gil on Laruth Hashimoto  being transferred to [id number] for routine progression of care       Report consisted of patients Situation, Background, Assessment and   Recommendations(SBAR). Information from the following report(s) SBAR, ED Summary and MAR was reviewed with the receiving nurse. Lines:   Peripheral IV 12/02/20 Left Forearm (Active)   Site Assessment Clean, dry, & intact 12/02/20 1428   Phlebitis Assessment 0 12/02/20 1428   Infiltration Assessment 0 12/02/20 1428   Dressing Status Clean, dry, & intact 12/02/20 1428       Peripheral IV 12/02/20 Right Hand (Active)   Site Assessment Clean, dry, & intact 12/02/20 2056   Phlebitis Assessment 0 12/02/20 2056   Infiltration Assessment 0 12/02/20 2056   Dressing Status Clean, dry, & intact 12/02/20 2056        Opportunity for questions and clarification was provided.       Patient transported with:   Monitor  O2 @ Bipap liters  Registered Nurse

## 2020-12-03 NOTE — ROUTINE PROCESS
Respiratory Care Services     Policy Number: -[id number]    Title: Oxygen Protocol    Effective Date: 01/1996    Revised Date: 06/2013, 02/29/2016, 4/2018, 7/2019    Reviewed Date: 05/2014, 03/2015, 06/2017        I. Policy: The Oxygen Protocol will be initiated for all patients upon written order from physician for administration of oxygen therapy or if a patient is found to have an oxygen saturation of 88% or less. Special consideration: the goal of oxygen therapy for COPD patients is to maintain oxygen saturation between 88% - 92% to comply with GOLD Guidelines. II. Purpose: To provide protocol driven respiratory therapy for the administration of oxygen at concentrations greater than that in ambient air with the intent of treating or preventing the symptoms and manifestations of hypoxia. III. Responsibility: Director Respiratory Care Services, all Respiratory Care Practitioners     IV. Indications:   A. Implement this protocol for patients when physician orders oxygen to be administered or when patient is found to have an oxygen saturation of 88% or less. B. To assure routine monitoring of patient's oxygen saturation b.i.d. and to make appropriate adjustments in accordance with ordered oxygen saturation parameters. C. To assure continuity of respiratory care that meets [facility] Clinical Practice Guidelines and GOLD Guidelines. D. Hb < 8  E. Sickle Cell anemia crisis    V. Assessment:  Assess the following parameters to determine the need to adjust oxygen:  A. Measurement of patient's oxygen saturation via pulse oximetry. B. Observation of patient's color, respiratory effort, and responsiveness. C. Measurement of heart rate and respiratory rate. D. Complete a three-step ambulatory oxygen saturation when ordered. VI. Initiation:  Upon receipt of an order for oxygen, the RCP will:   A.  Verify order in the patient's EMR, which should include the desired oxygen saturation to be maintained. B. The patient shall be placed on oxygen with humidity (except for those oxygen delivery devices that do not require humidity, i.e. venturi masks and non-rebreather masks) as ordered by the physician to achieve the prescribed oxygen saturation. C. In the event that no saturation is specified, a saturation of 90% will be maintained. D. Patients, who are found to have a SaO2 of 88% or less, may be started on supplemental oxygen as described above. E. Patients admitted with cardiac problems/disease shall be maintained at 92% per Chest Committee recommendation. F. The patient will be informed of the \"no smoking policy\" and instructed in the proper use of oxygen therapy. G. Once desired oxygen saturation has been achieved, the RCP will document FIO2 and oxygen saturation in the respiratory section of the patient's EMR. VII. Maintenance:   A. 30-second oxygen saturation check will be taken to maintain the saturation ordered by the physician each day. B. Patients will be assessed each shift and as needed by pulse oximetry to determine if oxygen needs to be decreased, increased or discontinued. C. If changes in FIO2 are indicated, all changes will be documented in the respiratory section of the patient's EMR. D. If no changes in FIO2 are required, the patient's oxygen flow rate and saturation will be recorded in the respiratory section of the patient's EMR. E. Per [facility], patients who are receiving oxygen therapy but are not on oxygen at home, should be weaned off oxygen as soon as possible or when anticipated discharge becomes evident. Osei Hui will be discontinued after oxygen saturation has been maintained for 24 hours on room air and documented in the patient's EMR. G. Patients on the Inpatient Rehabilitation area on 9th floor will be exempt from having their oxygen discontinued per protocol.  Oxygen may be weaned but will be changed to prn to meet the needs of the patient when exercising and participating in therapy. H. The goal of oxygen therapy is to maintain patients with a diagnosis of COPD at oxygen saturation between 88% - 92% to comply with GOLD Guidelines. VIII. Safety: RCP will address the following safety issues:  A. Identify patient using the two patient identifiers name and birth date via ID bracelet. B. Perform hand hygiene per hospital policy utilizing Standard Precautions for all patients and following transmission-based isolation as indicated per hospital policy. C. Cardiac patients will be maintained at an oxygen saturation of 92%. D. If a patient's FIO2 requirements necessitate changing oxygen delivery devices to a high concentration of oxygen, documentation indicating the change must be included in the progress notes, as well as in the respiratory flowsheet. E. If a patient has a hemoglobin level <8 mg. RCP will consult physician before discontinuing oxygen. IX. Interventions:   A. RCP will assess patient for signs of respiratory distress or suspicion of CO2 retention. B. An ABG may be obtained for patients exhibiting respiratory distress or sickle cell      crisis. C. An order should be entered into patient's EMR for ABG under per protocol. X. Documentation  A. Document assessment findings in the respiratory section of the patient's EMR. B. Document changes in therapy per protocol in the respiratory orders section and in the care plan section of the patient's EMR. C. Document patient education in the patient education section of the patient's EMR. XI. Reportable Conditions:  Report to the physician immediately:  A. Acute changes in patient's respiratory status. B. An oxygen saturation <85%. C. A change in oxygen delivery device to provide a high concentration of oxygen. XII. Patient Instructions: Review with Patient  A. Purpose of oxygen therapy  B. Proper technique for using oxygen  C.  No smoking policy    Approval: Pulmonary Committee (1-25-96)  Revision: Chest Committee (4-28-05)    L - Respiratory Care Department Policy, Procedure and Protocol Guideline Manual, 1995,         TARYN Euceda. L - Therapist Driven Respiratory Care Protocols  A Practitioner's Guide for Criteria-Based       Respiratory Care by Tyrone Cheek M.D., and TARYN Mustafa RRT. L - The rationale for therapist-driven protocols: an update. Respiratory Care [facility]. N  [facility] Clinical Practice Guidelines. Respiratory Care Services       Policy Number: -[id number]    Title: Patient Care Assessment Protocol    Effective Date: 01/1999    Revised Date: 05/2014, 04/2018, 12/2018, 07/2019    Reviewed Date: 06/2013/ 03/2015, 03/2016, 06/2017        Overview  In an effort to improve quality and reduce costs of respiratory care at [facility], the Respiratory Department has developed a number of Patient Care Protocols. These protocols have been developed according to Nia 3 and are utilized for those patients who are ordered respiratory therapy using therapeutic indications and standardized approaches for accomplishing objectives. Patient Care Protocols are intended to improve care by:   Defining the indications and standards of care agreed upon by the Pulmonary Medicine and [address] [place] [address] of [facility].  Training respiratory care practitioners to apply those criteria to individual patients and modify therapy as indicated by the protocols.  Documenting the indication and care plan as part of the initial ordering process.  Tapering or discontinuing treatments once the indication for therapy changes. The Patient Care Protocols shall be universally applied throughout the hospital as determined by   the Pulmonary Medicine and [address] [place] [address] Ave.     Rationale for Patient Care Assessment Protocols:   Continuous Quality Improvement   Cost containment   Standardization of care   Enhanced continuity of care   Utilization review   Timely intervention    The following patient care assessment protocols have been developed:   Aerosolized Medication Protocol http://[email or web address]/local[employer]/gv/stfrancis/policies/Respiratory%20Care%20Services%20Policies/-[id number]. doc    Bronchial Hygiene Protocol http://spweb/[employer]/gvl/stfrancis/policies/Respiratory%20Care%20Services%20Policies/-[id number]. doc   Oxygen Protocolhttp://sppatb/local9flats/gvl/stfrancis/policies/Respiratory Care Services Policies/-[id number]. doc http://spGenoomb/[employer]/stfrancis/policies/Respiratory%20Care%20Services%20Policies/-[id number]. doc   CVRU Fast Track Weaning Protocol http://spweb/[employer]/gvl/stfrancis/policies/Respiratory%20Care%20Services%20Policies/-[id number]. doc    Asthma Treatment Protocol ERhttp://sppatb/[employer]/gvl/stfrancis/policies/Respiratory Care Services Policies/-[id number]. doc http://elsieGenoomb/[employer]/gvl/stfrancis/policies/Respiratory%20Care%20Services%20Policies/-[id number]. doc   Pediatric Asthma Treatment Protocol ERhttp://sppatb/[employer]/gvl/stfrancis/policies/Respiratory Care Services Policies/-[id number]. doc http://spGenoomb/local9flats/gv/stfrancis/policies/Respiratory%20Care%20Services%20Policies/-[id number]. doc   Alpha-1 Antitrypsin Deficiency Protocolhttp://spweb/[employer]/gvl/stfrancis/policies/Respiratory Care Services Policies/-[id number]. doc http://elsieGenoomb/local9flats/gvl/stfrancis/policies/Respiratory%20Care%20Services%20Policies/-[id number]. doc   Prone Positioning Protocol http://spb/[facility]/stfrancis/policies/Respiratory%20Care%20Services%20Policies/-[id number]. doc   COPD Protocol http://[facility]/localstems/[facility]/stfrancis/policies/Respiratory%20Care%20Services%20Policies/-[id number]. doc   Home Oxygen Assessment Protocolhttp://[facility]/stfrancis/policies/Respiratory Care Services Policies/-[id number]. doc http://spb/[facility]/stfrancis/policies/Respiratory%20Care%20Services%20Policies/-[id number]. doc   Ventilator Weaning Protocol http://spb/[facility]/stfrancis/policies/Respiratory%20Care%20Services%20Policies/-[id number]. doc   Lung Volume Expansion Protocolhttp://spweb/[facility]/stfrancis/policies/Respiratory Care Services Policies/-[id number]. doc http://[facility]/stfrancis/policies/Respiratory%20Care%20Services%20Policies/-[id number]. docm    The Director of [address] [place] [address] oversees the Patient Care Assessment Program. The Respiratory Educator is responsible for protocol development and training. The Supervisor is responsible for implementation and  activities. Each patient with an order for respiratory treatments will receive an evaluation. Respiratory Care Practitioners (RCP's) will perform the evaluations. The same evaluation tool will be utilized for initial and follow-up assessments. If the patient does not meet criteria for ordered therapy, the therapy will be discontinued. If the patient demonstrates an adverse response to initially ordered therapy, the therapy will be discontinued and the physician will be contacted. Specific physician's orders that deviate from protocols and are deemed \"inappropriate\" or \"unsafe\" will be addressed with ordering physician and/or medical director as required. Respiratory Patient Care Assessment Protocols    I. Policy:   In an effort to provide quality patient care and effective utilization of services, physicians who order respiratory therapy will have their patients treated via the protocols established (see attached) Respiratory Care Practitioners (RCP's) will complete the initial assessment which will indicate patient needs,  the care plan developed and will performed within 24 hours of admission. Frequency of the therapy will be set according to the results of the respiratory therapy evaluation and frequency guidelines policy. Reassessment will be continued every 48 hours and more frequently as needed for the individual patient. II. Purpose:     F. To provide a process that will allow for ongoing assessment and care plan modification for patients receiving respiratory services based on both objective and or subjective patient responses to interventions. This process of protocol utilization will assist in patient care progression while eliminating the need for the physician to continually update respiratory therapy orders. G. To assure continuity of respiratory care that meets [facility] Clinical Practice Guidelines. III. Initiation:  Implement Respiratory Care Protocols for patients who are ordered by physician          to receive respiratory therapy procedures or for ventilator management. IV. Protocol:  E. Upon receiving an order for therapy the RCP will review the patient's EMR (electronic medical record) for all pertinent information includin. Physician's order for therapy  2. Patient history and physical examination  3. Physician progress notes  4. Diagnostic. X-rays, PFT's, arterial blood gases etc.  F. The RCP will perform a respiratory assessment in the following manner:  1. General observations: color, pattern and effort of breathing, chest expansion, (symmetrical and bilateral), level of consciousness and the ability to ambulate. 2. The RCP will assess patient's cough ability and determine if bronchial hygiene is needed. If patient is unable to produce sputum, at that time, the RCP should question the patient with regard to their sputum: production, color consistency, frequency and amount.   3. Auscultation: Using a stethoscope, the RCP will listen and note quality of breath sounds and presence or absence of adventitious breath sounds in all lung fields, both anteriorly and posteriorly. 4. Upon completing the EMR review and physical assessment, the RCP will document findings in the RT Assessment section of the EMR. The score level will be provided and will be used to determine the frequency of therapy. V. Indications:   A. Bronchial Hygiene Protocol indications:   1. Potential for or presence of atelectasis. 1. Need for hydration and removal of retained secretions. 1. Need for improvement of cough effectiveness. 1. Presence of conditions associated with disorder of pulmonary clearance:  a. Cystic fibrosis  b. Bronchiectasis  c. Neuromuscular disease  d. Obstructive lung diseases  e. Restrictive lung diseases   Aerosolized Medication(s) Protocol indications:1. Treatment of bronchospasm/wheezing  2. Improvement of mucociliary clearance  3. Treatment of stridor  4. History of Bronchiectasis, Asthma or COPD  C. Oxygen Therapy Protocol indications:   1. Documented hypoxemia  2. Severe trauma  3. Acute myocardial infarction  4. Short-term therapy (e.g. post anesthesia recovery)  D. CVRU Ventilator Weaning Protocol indications:  1. All mechanically ventilated surgical patients unless they have a no wean order. E. Asthma Treatment Protocol ER indications:  1. Patients 15years of age and older that have been triaged or diagnosed with   asthma exacerbation shall be indicated for the ER Asthma Treatment Protocol. A physician order will be required to initiate the protocol. F. Pediatric Asthma protocol in the ER indications:  1. Patients less than 15years old that have been triaged or diagnosed with asthma exacerbation shall be indicated for the Pediatric Asthma protocol. A physician order will be required to initiate the protocol. G. Alpha-1 Antitrypsin Deficiency Testing protocol indications:         1. Patients admitted and diagnosed with COPD. H. Prone Positioning Protocol indications:         1. Acute lung injury         2.  Acute respiratory distress syndrome (ARDS)   I. Respiratory Care COPD Protocol indications:         1. History of COPD in patient's records         2. Smoking history   J. Home Oxygen Assessment Protocol indications:         1. Chronic lung disease         2. Cor pulmonale         3. Unable to wean to room air 48 hours prior to anticipated discharge. K.  Ventilator Weaning Protocol indications:         1. Patient's mechanically ventilated          2. Managed by intensivist  L. Lung Volume Expansion Protocol indications:        1. Any patient at risk for pulmonary complications. VI. Maintenance:    H. Timely patient assessment is an integral part of this protocol therefore the following will be applied:  1. All non- critical care patients will be evaluated upon receiving initial respiratory care orders within 24 hours and re-evaluated within 48 hours (or more as needed). 2. Orders requesting a Respiratory Consult will be responded to in the following manner:  a. In patient emergency situations, the RCP assigned to the floor will respond immediately to the patient, provide an initial respiratory assessment, and contact the patient's physician as necessary for appropriate orders. b. In non-emergent situations, the RCP assigned to the floor will respond to the patient within 90 minutes and provide an initial respiratory assessment and contact patient's physician as necessary for appropriate orders. c. An RCP will provide a comprehensive assessment as soon as possible. 3. Upon completion of an evaluation, the RCP will complete documentation in the patient's EMR in the RT Assessment section. 4. The RCP who completes the assessment will document orders for therapy in the orders section of the patient's EMR selecting new order. Next, per protocol should be selected indicating it is a protocol order and sign orders should be selected to complete the process.  The applicable protocol must be added to the progress note per Joint Commission guidelines. 5. The Pharmacy and Therapeutics (P&T) Committee has mandated that the medication Xopenex may be changed to unit dose albuterol without an order, except for those patients receiving Xopenex due to cardiac arrhythmias. 1. The dosage for these patients should be 0.63 mg. and may be changed from 1.25 mg. to 0.63 mg per P & T Committee by the RCP completing the assessment. 6. Patients who are not experiencing cardiac arrhythmias, and are ordered Xopenex and Atrovent may be changed to Duoneb. VII. Safety:        I. The following safety issues shall be monitored:  1. The RCP will perform hand hygiene per hospital policy utilizing Standard Precautions for all patients and following transmission-based isolation as indicated per hospital policy. 2. The RCP must exercise professional judgment in classifying the patient for frequency of therapy. 3. Appropriate classification of the patient will require an evaluation utilizing the Therapy Assessment Protocol Guidelines. 4. The RCP will confer with the physician concerning the care of the patient at any time questions or problems arise. 5. If during therapy, the patient exhibits no improvement, or deterioration in clinical status the RCP will notify the physician and the patient's nurse. VIII. Interventions:   F. The patient's nurse is responsible concerning all items related to his/her care. Ongoing communication with nursing is essential to successful protocol management. G. The RCP recognizes the value of the team approach in meeting the patient's needs. Nursing input regarding the patient's pulmonary condition will be sought as needed. IX. Reportable conditions: The RCP will inform the physician if:  1. There are acute changes in patient's respiratory status. 2. The therapist is unable to determine appropriate care plan upon assessment. 3. The patient fails to reach therapeutic objective.   4. A change or additional medication is needed. X.  Patient Education:    D. Patient will receive instruction on the followin. The treatment modality, including objectives and proper technique of therapy  2. Respiratory medications  E. Documentation shall occur in the patient education section of the patient's EMR. XI. Documentation: Record all findings as described above in the patient's EMR. Related Protocols: A. Aerosolized Medication Protocol  B. Bronchial Hygiene   C. Oxygen Protocol   D. [facility] Fast Track Weaning Protocol  E. Asthma Treatment protocol ER  F. Alpha-1 antitrypsin Deficiency Protocol  G. Prone Positioning Protocol  H. Respiratory Care COPD Protocol  I. Home Oxygen assessment Protocol  J. Ventilator Weaning Protocols   K. Volume Expansion protocol    Indications      Frequency          Level  A. Aerosol therapy   1.  q4h     Severe SOB, wheezing, unable to sleep 1   2.  qid, q4 wa or q6h   Moderate SOB, wheezing   2   3.  tid      Hx of asthma, or COPD mild wheezing,         or facilitate secretion removal              3   4.  bid      Asthma, or COPD, Intermittent wheezing 4   5. PRN, i.e. tid PRN, qid PRN Asthma, or COPD, occasional wheezing 5       B. Bronchopulmonary Hygiene    1. q4h             Copious secretions, SOB, unable to sleep 1   2. qid & PRN            Moderate amounts of secretions   2   3. tid           Small amounts of secretions and poor cough,               history of secretions    3    4. PRN, i.e. tid PRN, qid PRN     Breathing exercises, encourage cough only 4      C. Oxygen Therapy     Follow hospital approved Oxygen Protocol      Note:  qid treatments are due 0800, 1200, 1600, and 2000. tid treatments are due 0800, 1400, and 2000  Q6h treatments are due 0800, 1400, 2000, 0200  Q4 wa teatments are due 0800, 1200, 1600, and 2000. Q4h treatments are due  0800, 1200, 1600, 2000, 0000, and 0400.         The Level 1-5 rating system is only to be used as criteria for determining appropriate frequency of therapy. References:   N   Joint Commission Consolidated Stevo Standard   L    Respiratory Care Department Policy, Procedure and Protocol Guideline Manual, 1995, TARYN COHEN  Therapist Driven Respiratory Care Protocols  A Practitioner's Guide for Criteria-Based Respiratory Care by Rosanne Barrera M.D., and TARYN Hernandez RRT. L  The rationale for therapist-driven protocols: an update. Respiratory Care 1998; 82:095-242   L Therapist Driven Respiratory Care Protocols  A Practitioner's Guide for Criteria-Based Respiratory Care by Rosanne Barrera M.D., and TARYN Hernandez RRT. L The rationale for therapist-driven protocols: an update. Respiratory Care 1998; [id number]. N   [facility] Clinical Practice Guidelines. D. The RCP will perform a respiratory assessment in the following manner:  1. Perform hand hygiene per hospital policy utilizing Standard Precautions for all patients and following transmission-based isolation as indicated per policy. 2. Identify patient via ID bracelet verifying patient name and birth date. 3. General observations: color, pattern and effort of breathing, chest expansion, (symmetrical and bilateral), level of consciousness and the ability to ambulate. 4. The RCP will assess patients cough ability and determine if Nasotracheal suctioning is needed. If patient is unable to produce sputum, at that time, the RCP should question the patient with regard to their sputum: production, color consistency, frequency and amount. 5. Auscultation: Using a stethoscope, the RCP will listen and note quality of breath sounds and presence or absence of adventitious breath sounds in all lung fields, both anteriorly and posteriorly. 6. Upon completing the EMR review and physical assessment, the RCP will document findings in the RT Assessment section of the EMR. The score level will be provided and will be used to determine the frequency of therapy.     V. Indications:   A. Indications for Bronchial Hygiene Protocol will include:  1. Potential for or presence of atelectasis. 2. Need for hydration and removal of retained secretions. 3. Need for improvement of cough effectiveness. 4. Presence of conditions associated with disorder of pulmonary clearance:  a. Cystic fibrosis  b. Bronchiectasis  B. Indications for Aerosolized Medication(s) Protocol should include:  1. Treatment of bronchospasm/wheezing  2. Improvement of mucociliary clearance  3. Treatment of stridor  4. History of Asthma or COPD             C.  Indications for Oxygen Therapy Protocol should include:  1. Documented hypoxemia  2. Severe trauma  3. Acute myocardial infarction  4. Short-term therapy (e.g. post anesthesia recovery)    VI. Maintenance:    D. Timely patient assessment is an integral part of this protocol therefore the following will be applied:  1. All non- critical care patients will be evaluated upon receiving initial respiratory care orders within 24 hours and re-evaluated within 48 hours (or more as needed). 2. Orders requesting a Respiratory Consult will be responded to in the following manner:  a. In patient emergency situations, the RCP assigned to the floor will respond immediately to the patient, provide an initial respiratory assessment, and contact the patients physician as necessary for appropriate orders. b. In non-emergent situations, the RCP assigned to the floor will respond to the patient within 90 minutes and provide an initial respiratory assessment and contact patients physician as necessary for appropriate orders. c. An RCP will provide a comprehensive assessment as soon as possible. 3. Upon completion of an evaluation, the RCP will complete documentation in the patients EMR in the RT Assessment section. 4. The RCP who completes the assessment will document orders for therapy in the orders section of the patients EMR selecting new order.  Next, per protocol should be selected indicating it is a protocol order and sign orders should be selected to complete the process. 5. The Pharmacy and Therapeutics (P&T) Committee has mandated that the medication Xopenex may be changed to unit dose albuterol without an order, except for those patients receiving Xopenex due to cardiac arrhythmias. The dosage for these patients should be 0.63 mg. and may be changed from 1.25 mg. to 0.63 mg per P & T Committee by the RCP completing the assessment. 6. Patients who are not experiencing cardiac arrhythmias, and are ordered Xopenex and Atrovent may be changed to Duoneb. VII. Safety:        A. The following safety issues shall be monitored:  1. The RCP will perform hand hygiene per hospital policy utilizing Standard Precautions for all patients and following transmission-based isolation as indicated per hospital policy. 2. The RCP must exercise professional judgment in classifying the patient for frequency of therapy. 3. Appropriate classification of the patient will require an evaluation utilizing the Therapy Assessment Protocol Guidelines. 4. The RCP will confer with the physician concerning the care of the patient at any time questions or problems arise. 5. If during therapy, the patient exhibits no improvement or deterioration in clinical status the RCP will notify the physician and the patients nurse. VIII. Interventions:   A. The patients nurse is responsible concerning all items related to his/her care. Ongoing communication with nursing is essential to successful protocol management. B. The RCP recognizes the value of the team approach in meeting the patients needs. Nursing input regarding the patients pulmonary condition will be sought as needed. IX. Reportable conditions: The RCP will inform the physician if:  1. There are acute changes in patients respiratory status.   2. The therapist is unable to determine appropriate care plan upon assessment. 3. The patient fails to reach therapeutic objective. 4. A change or additional medication is needed. X.  Patient Education:    A. Patient will receive instruction on the followin. The treatment modality, including objectives and proper technique of therapy  2. Respiratory medications  B. Documentation shall occur in the patient education section of the patients EMR. XI. Documentation: Record all findings as described above in the patients EMR. Related Protocols: A. Aerosolized Medication Protocol  B. Bronchial Hygiene  C. Oxygen Protocol   D. Volume Expansion/Secretion Clearance  E. Ventilator Weaning Protocols    References:  N   Joint Commission Consolidated Stevo Standard   L    Respiratory Care Department Policy, Procedure and Protocol Guideline Manual, , TARYN Euceda. L  Therapist Driven Respiratory Care Protocols  A Practitioners Guide for Criteria-Based Respiratory Care by Teetee Lyons M.D., and TARYN Cantu, YENNI. L  The rationale for therapist-driven protocols: an update. Respiratory Care 1998; [facility] Guidelines. Respiratory Care Services     Policy Number: -[id number]    Title: Bronchial Hygiene Protocol    Effective Date: 1999    Revised Date: 2014, 2017, 2019    Reviewed Date: 2013, 2014, 2015, 2016, 2017, 2018     I. Purpose: The Respiratory Care Practitioner (RCP) will utilize the following protocol to select and initiate bronchial hygiene therapy to open and maintain obstructed airways when indicated. II. Patients: All patients who are ordered bronchial hygiene therapy. III. Clinical Area: All general patient floors     IV. Protocol: The following conditions or diseases are indications for bronchial hygiene                           therapy. H. Oscillating PEP Therapy        Indications should include:   5. Atelectasis caused by mucus plugging or foreign body  6.  Chronic mucociliary clearance disorders  7. Retained secretions which may be associated with the following conditions:  f. Bronchitis  g. Bronchiectasis  h. Pneumonia  I. PAP- Positive airway pressure therapy  Indications include:  5. Patients with post-operative atelectasis or to prevent post operative atelectasis. 6. Patients who cannot perform deep breathing exercises due to pain. 7. Patients requiring lung expansion therapy who cannot follow instructions. 8. Patients requiring lung expansion therapy with poor inspiratory capacity <10cc/kg. 9. Patients requiring aerosol therapy in conjunction with opening their airways. Josefaen Homme / Acoustical Airway Clearance Therapy (ACT)- i.e. (Vibralung, Vest, or Percussor)  Indications should include  5. Patient conditions  that involve retained secretions, increased mucus production and defective mucociliary clearance such as:  d. Cystic fibrosis  e. Chronic bronchitis  f. Bronchiectasis  g. Pneumonia  h. Asthma  i. Muscular dystrophy  j. Post-operative atelectasis  k. Neuromuscular respiratory impairments  l. ACT may be considered in patients with COPD with  symptomatic secretion retention, guided by patient preference,  toleration, and effectiveness of therapy Haylee Cloud et al., 2013). K. Nasotracheal suctioning indications should include:  5. Inability to cough effectively  6. Excessive secretions  7. Artificial airway      V. Equipment:   A. PEP therapy device   B. Vest therapy equipment   Olga Hankins   D. AccuPap   Amparo Dixon NT suction equipment       VI. Guidelines:   Monitor patient's vital signs and evaluate patient's clinical status. The need to                                change therapy modality may be indicated by:  Tacho Watson in patient's sensorium (patient now confused or obtunded, and unable to follow directions). H. A significant deterioration is evident on patient's chest radiograph or increased sputum production.   I. Increased thickening of secretions (e.g. mucolytic therapy may be indicated.)  J. Development of wheezing  K. Decrease in oxygen saturation  L. Development of chest pain. VII. Clinical Responsibility: The Therapy Assessment Protocol guidelines will be used to                re-evaluate all patients on bronchial hygiene therapy (See Therapy Assessment Protocol). I. RCP's will perform changes in therapy according to protocol. Ralph Drew. Bronchial hygiene therapy may be discontinued when goals of therapy are met, e.g., secretions easily expectorated for 48 hours, atelectasis is resolved, etc.  K. PAP Therapy may be utilized in place of IPPB therapy per discretion of the RCP, as approved by the Pulmonary Medicine and [address] [place] [address]. VIII. Outcome Criteria:  Outcome criteria for bronchial hygiene therapy should include:  J. Decrease in sputum production  K. Improved breath sounds  L. Improved arterial oxygen tension and/or SaO2  M. Improved chest X-ray  N. Subjective response to therapy    IX. Documentation  D. Document assessment findings in the respiratory assessment section of the patient's EMR. E. Document changes in therapy per protocol in the respiratory orders section and in the care plan section of the patient's EMR. F. Document patient education in the patient education section of the patient's EMR. X. Related Protocols:  2. Respiratory Patient Care Assessment Protocols  2. Aerosolized Medication Protocol  2. Oxygen Therapy Protocol        Reference:    L - Respiratory Care Department Policy, Procedure and Protocol Guideline Manual, 1995, TARYN Euceda. L - Therapist Driven Respiratory Care Protocols  A Practitioner's Guide for Criteria-Based         Respiratory Care by Bernardo Casey M.D., and TARYN Snow RRT. N  [facility] Clinical Practice Guidelines. Hannah Rosen., Avis Urrutia., Yanick Hill., Mya Johnson., Michelle De La Paz, . . . FRANCISCO J Sharif (2013, December).  [facility] Clinical Practice Guideline: Effectiveness of Nonpharmacologic Airway Clearance Therapies in Hospitalized Patients. Respiratory Care, 58(95), 5998-6139. Retrieved 2019                      Respiratory Care Services     Policy Number: -[id number]    Title: Noninvasive Positive Pressure                         Ventilation, (BIPAP VISION) and            Respironics (V60)    Effective Date: 2005, 2017    Revised Date: 2012, 2014, 2015, 2016   I. Description: Non Invasive ventilation (NIV) is a ventilatory assist technique used as an alternative to endotracheal intubation. NIV is pressure ventilation delivered as BIPAP (Bi-level Positive Airway Pressure) which is a low pressure electronically driven device intended for use as a ventilatory support system for patients who have an intact respiratory drive. The device provides non-invasive ventilatory assistance through the use of a nasal or full face mask. BiPAP  (two levels of ventilatory support) known as inspired positive airway pressure (IPAP) and  positive airway support (EPAP). One level of support can also be delivered which is delivered as EPAP or CPAP which is delivered throughout the respiratory cycle. The aim is to maintain adequate ventilation and minimize the effort of breathing. The BIPAP Vision System and the Respironics V60 are the two systems available for non-invasive ventilation. These devices are also capable of being used for invasive ventilation in the critical care units only. BIPAP Vision: This device uses an electronic pressure control sensing monitor pressure differential in the patient circuit. This feedback allows for adjustment of the flow and pressure output  to assist in inhalation or exhalation through the administration at two distinct levels of positive pressure. During inspiration, the level is variably positive and is always higher than the expiratory level.  During exhalation, pressure is variably positive or near ambient. In addition, this device has the ability to compensate for leaks through automatic               adjustment of the trigger threshold. This capability allows for the application of BIPAP    for mask-applied ventilation assistance. Respironics V60  The Respironics V60 uses Auto-Trak technology to help ensure patient synchrony and therapy acceptance and is designed to address the specific challenges of NIV. By providing auto-adaptive leak compensation, inspiratory triggering, and expiratory cycling, Auto-Trak delivers optimal synchrony in the face of dynamic leak and changing patient demand. The Respironics V60 is designed to include pediatric use and is equipped with several modes, which allows the practitioner to meet the specific needs of the patients. See Appendix 1 for definitions of settings. II. Policy: The administration of non-invasive positive pressure ventilation (NPPV) will be the responsibility of the Respiratory Care Practitioner (RCP). Upon receipt of a physician order, proper patient instruction, set up and monitoring will be provided to ensure patient understanding, compliance and proper utilization of prescribed therapy. A. A patient may be allowed to use their own NPPV machine after having their equipment checked and approved by [employer]. B. A consent and Release for Home Ventilator form must be signed by the patient and witnessed by a health care provider if the patient chooses to use his home ventilator. C. A patient that is being treated for acute respiratory failure and placed on non-invasive ventilation must be placed in a critical care unit, per Medical Director. D.  A patient who is being treated for sleep apnea may be treated on the floors. E.   A patient has the option of using a hospital owned NPPV unit. F.   A patient may use a hospital unit and their own mask if they choose.   G. Patients may be placed on full face mask with NPPV units on the hospital floors            under the following conditions:  1. Patient has an end stage disease process. 2. Patient was placed on full face mask and NPPV unit in the Critical Care Units and it has been established as safe and effective therapy. 3. Patient is ordered full face mask with NPPV unit for home use. 4. In the event patient is to be set up or transitioned to home on a NPPV unit, the Respironics V60 (in the AVAPS mode) shall be utilized while the patient is in the hospital. If a Farzana Sommers is unavailable, a home NPPV unit may be provided by the DME provider for no more than 48 hours. III. Responsibility: Director, Jazzy Swanson, and all Respiratory Care          Practitioners with documented competency. IV. Indications for non-invasive ventilation:  A. Acute respiratory failure (Patient must be in Critical Care Unit)  B. Chronic respiratory failure  C. Alveolar hypoventilation  D. Documented sleep apnea  V. Contraindications:  A. Patients with severe respiratory failure without a spontaneous respiratory drive  B. Noninvasive ventilation may be contraindicated for patients with the followin. Inability to maintain a patent airway or adequately clear secretions  2. Acute sinusitis or otitis media  3. Risk for aspiration of gastric contents  4. Hypotension  5. Pre-existing pneumothorax or pneumomediastinum  6. Epistaxis  7. Recent facial, oral or skull surgery or trauma  8. history of allergy or sensitivity to mask materials where the risk from allergic reaction outweighs the benefit of ventilatory assistance  C. Potential Complications:  1. Cardiovascular compromise  2. Skin breakdown and discomfort from mask  3. Gastric distention  4. Increased intracranial pressure  5. Pulmonary barotraumas    VI. Mask fit  A. It is essential that the patient be correctly fitted with an appropriate size mask. 1. Choose mask according to sizing template on disposable setups.   2.  The mask should fit comfortably on the patients nose,  not occluding the            nares and the base of the mask should fit comfortably between the chin and        bottom lip see picture on setup guide. 3.  Headgear should fit comfortably not tight. The bottom edge of the headgear        should sit at the base of the nape of the neck with side straps underneath the        earlobes. 4. The face masks are better for patients who are dyspneic and tachypneic as            they tend to mouth breathe with a nasal mask and reduce the effectiveness          of the ventilation. 5. Masks that are too tight are uncomfortable and cause pressure areas and              masks that are too loose leak which reduce the efficiency of the system. 6. When using a nasal mask the patient must keep their mouth closed to obtain       the desired effect of the ventilation. 7. Place an Allevyn Gentle Border dressing over bridge of nose to avoid skin          breakdown. VII. Equipment for BIPAP Vision  A. BIPAP Vision Ventilatory Support Unit  B. BIPAP Vision disposable circuit with proximal pressure and exhalation port. C. Main flow bacterial filter  D. Nasal or face mask and disposable head strap  F. Smooth inner lumen tubing for connecting the humidifier system to the BIPAP unit. G. Pulse oximetry equipment and supplies  H. Oxygen analyzer with circuit adapter  I. Device specific humidification system which must be used when using BIPAP. VIII. Procedure for Non-invasive ventilation via BIPAP Vision:    A. BIPAP Vision Set Up  1. Assemble the circuit with exhalation port proximal to the patient. 2.  A bacterial filter and oxygen analyzer should be place between the                             machines patient interface port and the patient circuit. If using the oxygen               module, connect to a 50 psi oxygen source. 3. Attach humidifier to the system. 4. Plug electrical cord in AC outlet.  Press START on the back of the machine. 5. The Vision will perform a self-test as indicated by the display screen,                        System Self-Test in Progress.   6. Perform the Test Exhalation Port second button from top, left of screen. a) Insure that whistle port and pressure tubing are in line. b) Occlude end of whistle port at end of tubing throughout the test.  c) Press START TEST, top button right screen; this tests the leak of the            circuit. 7. Assess appropriateness of physicians orders and set ventilatory parameters accordingly. Initial settings as well as changes to ventilatory parameters must be accompanied by a physician order. 8. Select the proper mode by first selecting the mode button at the bottom of screen. a) Choose CPAP or ST mode, top button, right side of screen per  order. b) Activate view mode by pressing the [facility] button, bottom right of screen. 9. Select the parameters button below the screen. a) Choose a parameter from the left and right sides of screen. Press the soft button for the parameter of choice. Once it is highlighted, spin knob clockwise to increase value, and counter clockwise to decrease value in the parameter block. Repress the button for that particular parameter to activate the new value. b) Consult the BIPAP Vision Ventilatory Support System Clinical Manual for specific information on the modes of operation and set parameters. 10.   Select alarms button, below screen. Set values for Hi Pressure, Lo Pressure, Lo      Pressure Delay, Apnea, Lo Min Vent, Hi Rate, Lo Rate as appropriate for patient. B. Post Procedure -Cleaning and Sterilization for the BIPAP Vision  1. Before cleaning the unit, turn the Start/Stop switch to the Stop position and unplug the electrical cord from the wall and from the rear of the unit. 2. Clean the front panel with water or 70% Isopropyl Alcohol only. Do not immerse the Vision unit in water.   3. Clean the exterior of the enclosure with a hospital approved disinfectant solution. Do not allow any liquid to enter the inside of the ventilator. 4. Refer to the BIPAP Vision Ventilatory Support System Clinical Manual, Chapter 15 Cleaning and Routine Maintenance and Replacing the [facility]. IX. Procedure for non-invasive ventilation using the V60:  A. Set up machine circuit using disposable mask and circuit setups only. 1.  Ensure that there is always an exhalation port at the base of the mask for C02 to be . 2. Set mode and settings as per physician orders. 3.  See Appendix 1 for definitions of all modes, usual and alarm settings. 4. Set Alarms on machine. See usual alarm settings in appendix. 5.  Turn on machine and gently hold mask over nose/face until patient becomes accustomed to the airflow. 6. Attach the head strap. 7. Stay with patient until the 02 saturations and observations are stable. B. Monitor patients response for:   1. Decreased Heart rate, respiratory rate, and blood pressure. 2. Decreased sweating   3. Decreased work of breathing (as per baseline observations)   4. Patient feels more comfortable  5. Patient finds it easier to breathe     X. Monitoring:  A. While in use, the RCP should check the patient and non-invasive unit no less than every four hours. B. Failure of NIV should be suspected if:   1. The patient is unable to maintain adequate oxygenation, decreasing 02 saturations despite increases in 02.  2. There is a reduction in neurological or conscious state. 3. The patient has excessive secretions or increasing respiratory rate. 4. Failure of PaCO2 or PH to improve on ABG sample. 5. The patient has poorly compliant lungs. XI. Weaning                A. Weaning or cessation of non-invasive ventilation should occur under the following                        conditions:  1.  PaC02 returns to normal and patient maintains O2 saturations with minimal oxygen. 2. Respiratory rate is returned within normal limits. 3. Patient is unable to tolerate non-invasive ventilation. 4. Patients dyspnea is reduced. 5. Patient exhibits normal overnight ventilation. 6. Patient is receiving palliative treatment. XII. Documentation:  A. Documentation should include the followin. Ventilator settings comply with physician order. 2. Ventilator is functioning properly as evidenced by a check of measured volumes, rates, pressures and FIO2. 3. Alarms are set appropriately. 4. Measured inspired gas temperature (invasive mode only)  5. Vital signs (pulse, respiratory rate, oxygen saturation, breath sounds)  6. Patient tolerance to therapy. 7. Manual resuscitator and appropriate size mask at patient bedside      REFERENCES  BIPAP Vision [address] Therapy and Respiratory Care Section. BRAYDON Bautista 2001. Introducing non-invasive positive pressure ventilation. Nursing Standard. 15,26, 42-45. Severiano Gleason. 2003. Using non-invasive ventilation in acute wards: part 2. Nursing Standard. 18,1, 41-44. Musa 47. Use of continuous positive airway pressure (CPAP) in the critically ill-physiological principles. Critical Care 12 ([phone]     KENNETH Gomes2002. Bi-level positive airway pressure (BiPAP) and acute cardiogenicpulmonary edema   ( ACPO) in the emergency department. Critical Care 15 (2):51-63    MISSAEL West2002. Non-invasive BiPap-implementation of a new service. Intensive and Critical Care Nursing [phone]     DomingoS.,et al 2002. Non-invasive ventilation in acute respiratory failure.  [place] 57:192-211       DEFINITIONS AND USUAL SETTINGS                                                            APPENDIX 1                                                             Settings   Settings in  Active   CPAP Settings in  Active   BiPAP Description Range Usual Setting  Used in NIV         CPAP Mode                     Continuous Positive Airway  Pressure   4-24 cmH20 8-14     ST or  BiPAP MODE                           Spontaneous Timed or BiLevel Positive Airway Pressure Ventilation. Two level system of alternating during non- invasive ventilation in sync with breathing-set IPAP and EPAP      __     __   AVAPS Mode    (only available  on V60)          The volume-targeted AVAPS (average volume-assured pressure support) mode combines the attributes of pressure-controlled and volume-targeted ventilation. __     __       EPAP                                       Positive Airway Pressure. Recruits under ventilated alveoli to remain open during expiration by providing a constant pressure throughout resp. cycle. Must be less than or equal to IPAP    4-25 cmH20 5-14                 Settings Settings in Active CPAP Settings in Active BiPAP Description Range Usual Setting Used in NIV     IPAP                           Inspired Positive Airway Pressure provides pressure throughout the inspiratory phase to support patient ventilation  4-40 cmH20 10-20               I-time                    Inspiratory time- time taken to inspire in seconds  0.3  3.0 sec 1:3   FIO2                   Oxygen delivered  21- 100% As ordered         Ramp time                                      The Ramp Time function helps your patient adapt to ventilation by gradually increasing inspiratory and expiratory pressure over a set interval (minutes). This time gradually delivers pressures so to reduce patient anxiety and increases comfort. Off  or  5-45 minutes 10 minutes                   Rate (RR)          Patients respiratory rate 4- 60 BPM 4     Rise time          Speed at which the inspiratory pressure rises to the set pressure.   1-5  (1 is the fastest) Set at 3                   Patient Data  Data Description Range Usual setting in NIV   Breath phase/trigger Indicator  Bar in left hand corner. Colored according to breath trigger. n/a n/a   PIP Positive Inspiratory pressure  0-50 n/a   Patient total leak Est. or unintentional leak  0-200 n/a   Patient trigger Patient triggered breaths as a percentage  0-100% Should be 100%   Respiratory rate Respiratory rate 0-90 n/a   Ti/Ttot Inspiratory duty cycle or inspiratory time divided by total cycle time  0-91% n/a   Minute volume Est. minute ventilation. TV x rate=MV  0-99 LPM n/a   Tidal volume Est.  tidal volume  0-3000 ml n/a     Alarms  Alarm Description Range Set  at   [facility] Rate High respiratory rate 5-90 10 breaths above patients breath rate   Low Rate Low respiratory rate alarm 1-89 BPM 5 breaths below patients breath rate   Hi VT High tidal volume alarm 200-2500 ml 200 ml above patients own   Low VT Low tidal volume alarm OFF  1500 ml OFF   HIP High Inspiratory pressure alarm 5-50 cm H20 10 cm above IPAP   LIP Low inspiratory pressure alarm OFF, 1-40 cmH20 OFF   Lo VE Low minute vent alarm OFF, 0.1 to 99L/min OFF   LIP T Low inspiratory delay time 5-60 seconds 5 seconds       CPAP Settings BiPAP Settings     Set CPAP level as ordered Set breath rate as ordered     Set FIO2 as ordered Set I-time as 1.3     Set Ramp time as ordered Set EPAP as ordered     Set C-Flex at 3 Set IPAP as ordered      Set Rise time as ordered      Set FIO2 as ordered                                      Respiratory Care Services  Consent and Release for Home Ventilator      Patient Name: _________________________________________ Room: ___________    SSN: _______________________________           Account Number:  __________________    Use and care of my personal home ventilator, (invasive or non-invasive) mechanical life support device while at [facility] system has been discussed with me by my physician and I have been provided with an opportunity to ask questions which have been answered to my satisfaction.  I also understand a respiratory care practitioner is not expected to remain in my room or general vicinity at all times. It is understood that every precaution consistent with the best medical practice will be taken and I give Respiratory Care Services permission to monitor my ventilator during my hospital stay. I hereby release [facility] system, its agents, employees and medical staff from liability arising from any and all claims, demands, losses and damages to person and/or property as a result of the above mentioned requests. I certify that I have read and understand this consent agreement and release and that I am legally qualified to regis this authorization. ___________________  Date    _____________________________________        ________________________  Signature of Patient or Parent (of minor patient,                  Relationship (if other than Patient)  nursing home parent) if applicable. Closest Relative,  Guardian or legal Representative    _____________________________________  Witness  Legal representative is defined as person named by the court as a guardian, executor or , or having power of  specifically set forth to authorize this action.     [phone]3/02, 7/14[facility]   Consent and Release for Home Ventilator

## 2020-12-03 NOTE — PROGRESS NOTES
Patient admitted from the ED as COVID positive on bipap, attempted to wean fio2 but failed trail, aspirated on small amount of water while on bipap break, blood glucose elevated in the 600's however patient on a dextrose gtt- POC WNL, resting comfortably in bed, TM

## 2020-12-03 NOTE — PROGRESS NOTES
Bedside shift change report given to NAYAN Hamilton (oncoming nurse) by Sean Smith RN (offgoing nurse).  Report included the following information SBAR, Kardex, Intake/Output, Recent Results, Med Rec Status and Cardiac Rhythm SB.

## 2020-12-03 NOTE — PROGRESS NOTES
CM made contact with patient spouse Marquise Dumont)  via phone. Spouse verified demographic no changes needed. Spouse verified PCP and insurance. States patient has no issue with purchasing medication. Spouse states that they live in a one level home with one step to enter into the home. States that patient needs assistance with ADL's and do has several DME's (RW, cane, wheelchair, grab bars, shower chair, BSW, urinal) in the home. States that patient has had several falls and had an appointment today with neurologist but had to be cancelled. States that patient has been diagnosis with [facility]. Spouse states uncomfortable with caring for patient in the home, states that patient will need rehab before returning back home. CM will continue to follow patient and follow recommendation that will be requested as patient continue progress. Care Management Interventions  PCP Verified by CM: Yes(Marie Ventura MD)  Mode of Transport at Discharge:  Other (see comment)  Transition of Care Consult (CM Consult): Discharge Planning  Current Support Network: Own Home, Lives with Spouse  Confirm Follow Up Transport: Family  The Patient and/or Patient Representative was Provided with a Choice of Provider and Agrees with the Discharge Plan?: No  Freedom of Choice List was Provided with Basic Dialogue that Supports the Patient's Individualized Plan of Care/Goals, Treatment Preferences and Shares the Quality Data Associated with the Providers?: No  [place] Resource Information Provided?: No  Discharge Location  Discharge Placement: Unable to determine at this time

## 2020-12-03 NOTE — PROGRESS NOTES
Critical Care Daily Progress Note: 12/3/2020    Edgardo Matos   Admission Date: 12/2/2020         The patient's chart is reviewed and the patient is discussed with the staff. Patient is a 79 y.o.  male presents with acute hypoxemia respiratory  failure secondary to COVID-19. The patient arrived via EMS from home with reports on acute onset of dyspnea. He was recently in the ED on 11/27/2020 secondary to gait instability and subsequent fall. He is currently being worked up for [place]. Upon arrival to the Ed, the patient's SPO2 levels were in the 50's on RA. He was placed on oxygent therapy with escalation to BiPAP. Reports no known COVID exposure. The patient has minimal medical history because he does not routinely see a PCP and only had has first office appointment on 10/8/2020 with Dr. Ashley Corona due to neurological changes. He is a previous smoker but quit in 2007. The patient's son is the primary historian. The family wasn't aware that the patient could have COVID. The son suggested that the patient was having some dyspnea x 1 week. Also suggested he has been bedridden for nearly a week due to neurological changes. The rapid COVID-19 test was completed in the ED and was positive. Lactic acid level is 1.3, procal 0.28, WBC 7.3     Subjective:    On bipap 60% fio2 with sats in the 90s    Current Facility-Administered Medications   Medication Dose Route Frequency    influenza vaccine 2020-21 (6 mos+)(PF) (FLUARIX/FLULAVAL/FLUZONE QUAD) injection 0.5 mL  0.5 mL IntraMUSCular PRIOR TO DISCHARGE    sodium chloride (NS) flush 5-40 mL  5-40 mL IntraVENous Q8H    sodium chloride (NS) flush 5-40 mL  5-40 mL IntraVENous PRN    0.9% sodium chloride infusion 250 mL  250 mL IntraVENous PRN    dexamethasone (DECADRON) 4 mg/mL injection 6 mg  6 mg IntraMUSCular DAILY    insulin regular (NOVOLIN R, HUMULIN R) injection   SubCUTAneous ACB&D    dextrose 5% and 0.9% NaCl infusion  50 mL/hr IntraVENous CONTINUOUS    cefTRIAXone (ROCEPHIN) 1 g in 0.9% sodium chloride (MBP/ADV) 50 mL MBP  1 g IntraVENous Q24H    azithromycin (ZITHROMAX) 500 mg in 0.9% sodium chloride 250 mL (VIAL-MATE)  500 mg IntraVENous Q24H       Review of Systems    Constitutional:  negative for fever, chills, sweats  Cardiovascular:  negative for chest pain, palpitations, syncope, edema  Gastrointestinal:  negative for dysphagia, reflux, vomiting, diarrhea, abdominal pain, or melena  Neurologic:  negative for focal weakness, numbness, headache      Objective:     Vitals:    12/03/20 0500 12/03/20 0528 12/03/20 0600 12/03/20 0634   BP: 113/68  117/69    Pulse: 71  (!) 50    Resp: 28  28    Temp:       SpO2: 98% 92% 93% 91%   Weight:       Height:             Intake/Output Summary (Last 24 hours) at 12/3/2020 0753  Last data filed at 12/3/2020 9798  Gross per 24 hour   Intake 50 ml   Output 200 ml   Net -150 ml       Physical Exam:          Constitutional:  the patient is well developed and in no acute distress  EENMT:  Sclera clear, pupils equal, oral mucosa moist  Respiratory: bilateral crackles  Cardiovascular:  RRR without M,G,R  Gastrointestinal: soft and non-tender; with positive bowel sounds. Musculoskeletal: warm without cyanosis. There is no lower extremity edema.   Skin:  no jaundice or rashes, no wounds   Neurologic: no gross neuro deficits     Psychiatric:  alert and oriented x 3    LINES:  peripheral    DRIPS:  none    CXR:       LAB  Recent Labs     12/03/20  0522   GLUCPOC 154*      Recent Labs     12/03/20  0425 12/02/20  1348   WBC 4.5 7.3   HGB 11.4* 13.6   HCT 35.9* 41.0*    194     Recent Labs     12/03/20  0425 12/02/20  1348   * 141   K 3.5 4.3   * 107   CO2 24 29   * 109*   BUN 28* 33*   CREA 0.93 1.03   MG 2.5*  --    PHOS 3.5  --    CA 7.1* 8.6   ALB  --  2.8*   TBILI  --  0.6   ALT  --  30     Recent Labs     12/03/20  0310 12/02/20  1353   PHI 7.40 7.46*   PCO2I 37.9 31.6*   PO2I 110* 53*   HCO3I 23.5 22.3     Recent Labs     12/02/20  1621 12/02/20  1348   LAC 1.0 1.3     Recent Labs     12/03/20  0310 12/02/20  1353   PHI 7.40 7.46*   PCO2I 37.9 31.6*   PO2I 110* 53*   HCO3I 23.5 22.3       Patient Active Problem List   Diagnosis Code    Acute respiratory failure with hypoxia (HCC) J96.01    COVID-19 U07.1    Acute hypoxemic respiratory failure due to COVID-19 (HCC) U07.1, J96.01    Acute respiratory distress syndrome (ARDS) due to COVID-19 virus (HCC) U07.1, J80         Assessment:  (Medical Decision Making)     Hospital Problems  Date Reviewed: 10/9/2020          Codes Class Noted POA    Acute respiratory failure with hypoxia [facility]) ICD-10-CM: J96.01  ICD-9-CM: 518.81  12/2/2020 Unknown        COVID-19 ICD-10-CM: U07.1  ICD-9-CM: 079.89  12/2/2020 Unknown        Acute hypoxemic respiratory failure due to COVID-19 [facility]) ICD-10-CM: U07.1, J96.01  ICD-9-CM: 518.81, 079.89, 799.02  12/2/2020 Unknown    On 60% bipap    Acute respiratory distress syndrome (ARDS) due to COVID-19 virus [facility]) ICD-10-CM: U07.1, J80  ICD-9-CM: 518.82, 079.89  12/2/2020 Unknown              Plan:  (Medical Decision Making)   1   Add remdesivire  2   Day 2 zithromax and rocephine  3    Decadron  4   bipap for now, trial of airvo  --    More than 50% of the time documented was spent in face-to-face contact with the patient and in the care of the patient on the floor/unit where the patient is located.     Alaina Nava MD

## 2020-12-03 NOTE — INTERDISCIPLINARY ROUNDS
Interdisciplinary team rounds were held 12/3/2020 with the following team members:Care Management, Nursing, Nurse Practitioner, Nutrition, Occupational Therapy, Palliative Care, Pastoral Care, Patient Relations, Pharmacy, Physical Therapy, Physician, Respiratory Therapy and Clinical Coordinator and the patient. Plan of care discussed. See clinical pathway and/or care plan for interventions and desired outcomes.

## 2020-12-04 NOTE — PROGRESS NOTES
Patient attempted to self prone overnight however he was unable to tolerate position for more than 1/2 hour and was ultimately placed on bipap, increased anxiety- MD notified ordering precedex however gtt held as patient fell asleep and appeared to be comfortable

## 2020-12-04 NOTE — PROGRESS NOTES
Attempted patient on airvo. He was unable to tolerate, increased anxiety, tachypneic, O2 sat 70-80s. RT placed patient back on BIPAP. Speech consult on hold as patient continues on BIPAP. Urinary output 250 ml on D5NS @ 50 ml/hr.

## 2020-12-04 NOTE — PROGRESS NOTES
Critical Care Daily Progress Note: 12/4/2020    Laron Riff   Admission Date: 12/2/2020         The patient's chart is reviewed and the patient is discussed with the staff.      70 y.o.  male presents with acute hypoxemia respiratory  failure secondary to COVID-19. The patient arrived via EMS from home with reports on acute onset of dyspnea. He was recently in the ED on 11/27/2020 secondary to gait instability and subsequent fall. He is currently being worked up for [place]. Upon arrival to the Ed, the patient's SPO2 levels were in the 50's on RA. He was placed on oxygent therapy with escalation to BiPAP. Reports no known COVID exposure. The patient has minimal medical history because he does not routinely see a PCP and only had has first office appointment on 10/8/2020 with Dr. Lizzette Solis due to neurological changes. He is a previous smoker but quit in 2007. The patient's son is the primary historian. The family wasn't aware that the patient could have COVID. The son suggested that the patient was having some dyspnea x 1 week. Also suggested he has been bedridden for nearly a week due to neurological changes. The rapid COVID-19 test was completed in the ED and was positive. Lactic acid level is 1.3, procal 0.28, WBC 7.3     Subjective:   Remains on bipap 60% fio2 with O2 sat 94, pt.  Did not tolerated airvo earlier    Current Facility-Administered Medications   Medication Dose Route Frequency    dexamethasone (DECADRON) 4 mg/mL injection 6 mg  6 mg IntraVENous DAILY    influenza vaccine 2020-21 (6 mos+)(PF) (FLUARIX/FLULAVAL/FLUZONE QUAD) injection 0.5 mL  0.5 mL IntraMUSCular PRIOR TO DISCHARGE    enoxaparin (LOVENOX) injection 30 mg  30 mg SubCUTAneous Q12H    remdesivir 100 mg in 0.9% sodium chloride 250 mL IVPB  100 mg IntraVENous Q24H    dexmedeTOMidine (PRECEDEX) 400 mcg in 0.9% sodium chloride 100 mL infusion  0.1-1.5 mcg/kg/hr IntraVENous TITRATE    sodium chloride (NS) flush 5-40 mL  5-40 mL IntraVENous Q8H    sodium chloride (NS) flush 5-40 mL  5-40 mL IntraVENous PRN    0.9% sodium chloride infusion 250 mL  250 mL IntraVENous PRN    insulin regular (NOVOLIN R, HUMULIN R) injection   SubCUTAneous ACB&D    dextrose 5% and 0.9% NaCl infusion  50 mL/hr IntraVENous CONTINUOUS    cefTRIAXone (ROCEPHIN) 1 g in 0.9% sodium chloride (MBP/ADV) 50 mL MBP  1 g IntraVENous Q24H    azithromycin (ZITHROMAX) 500 mg in 0.9% sodium chloride 250 mL (VIAL-MATE)  500 mg IntraVENous Q24H       Review of Systems    Constitutional:  negative for fever, chills, sweats  Cardiovascular:  negative for chest pain, palpitations, syncope, edema  Gastrointestinal:  negative for dysphagia, reflux, vomiting, diarrhea, abdominal pain, or melena  Neurologic:  negative for focal weakness, numbness, headache      Objective:     Vitals:    12/04/20 0740 12/04/20 0801 12/04/20 0821 12/04/20 0909   BP: (!) 144/78 (!) 151/75 (!) 148/80 134/65   Pulse: 60 63 (!) 58 60   Resp: (!) 42 (!) 36 (!) 38 26   Temp:       SpO2: (!) 89% 94% 100% 100%   Weight:       Height:             Intake/Output Summary (Last 24 hours) at 12/4/2020 0933  Last data filed at 12/4/2020 0701  Gross per 24 hour   Intake 2431.66 ml   Output 700 ml   Net 1731.66 ml       Physical Exam:          Constitutional:  the patient is well developed and in no acute distress  EENMT:  Sclera clear, pupils equal, oral mucosa moist  Respiratory: crackles bilateral  Cardiovascular:  RRR without M,G,R  Gastrointestinal: soft and non-tender; with positive bowel sounds. Musculoskeletal: warm without cyanosis. There is no lower extremity edema.   Skin:  no jaundice or rashes, no wounds   Neurologic: no gross neuro deficits     Psychiatric:  alert and oriented x 3    LINES:  peripheral    DRIPS:  none    CXR:       LAB  Recent Labs     12/04/20  0903 12/03/20  1627 12/03/20  0847 12/03/20  0522   GLUCPOC 105* 145* 150* 154*      Recent Labs     12/04/20  0404 12/03/20  0425 12/02/20  1348   WBC 11.9* 4.5 7.3   HGB 12.6* 11.4* 13.6   HCT 39.1* 35.9* 41.0*    164 194     Recent Labs     12/04/20  0404 12/03/20  0425 12/02/20  1348   * 146* 141   K 4.1 3.5 4.3   * 115* 107   CO2 28 24 29   * 686* 109*   BUN 30* 28* 33*   CREA 0.78* 0.93 1.03   MG 3.0* 2.5*  --    PHOS 2.6 3.5  --    CA 8.2* 7.1* 8.6   ALB  --   --  2.8*   TBILI  --   --  0.6   ALT  --   --  30     Recent Labs     12/04/20  0347 12/03/20  0310 12/02/20  1353   PHI 7.44 7.40 7.46*   PCO2I 32.0* 37.9 31.6*   PO2I 58* 110* 53*   HCO3I 21.7* 23.5 22.3     Recent Labs     12/02/20  1621 12/02/20  1348   LAC 1.0 1.3     Recent Labs     12/04/20  0347 12/03/20  0310 12/02/20  1353   PHI 7.44 7.40 7.46*   PCO2I 32.0* 37.9 31.6*   PO2I 58* 110* 53*   HCO3I 21.7* 23.5 22.3       Patient Active Problem List   Diagnosis Code    Acute respiratory failure with hypoxia (HCC) J96.01    COVID-19 U07.1    Acute hypoxemic respiratory failure due to COVID-19 (HCC) U07.1, J96.01    Acute respiratory distress syndrome (ARDS) due to COVID-19 virus (HCC) U07.1, J80         Assessment:  (Medical Decision Making)     Hospital Problems  Date Reviewed: 10/9/2020          Codes Class Noted POA    Acute respiratory failure with hypoxia [facility]) ICD-10-CM: J96.01  ICD-9-CM: 518.81  12/2/2020 Unknown        COVID-19 ICD-10-CM: U07.1  ICD-9-CM: 079.89  12/2/2020 Unknown        Acute hypoxemic respiratory failure due to COVID-19 [facility]) ICD-10-CM: U07.1, J96.01  ICD-9-CM: 518.81, 079.89, 799.02  12/2/2020 Unknown        Acute respiratory distress syndrome (ARDS) due to COVID-19 virus [facility]) ICD-10-CM: U07.1, J80  ICD-9-CM: 518.82, 079.89  12/2/2020 Unknown              Plan:  (Medical Decision Making)   1    remdesivire 12/3 start  2   Day 3 zithromax and rocephine  3    Decadron day 3  4   bipap for now, trial of airvo -so far not tolerating  --    More than 50% of the time documented was spent in face-to-face contact with the patient and in the care of the patient on the floor/unit where the patient is located.     Colt Jay MD

## 2020-12-04 NOTE — PROGRESS NOTES
Bedside shift change report given to Cher Villeda RN (oncoming nurse) by Chetna Bull RN  (offgoing nurse).  Report included the following information SBAR, Kardex, ED Summary, Intake/Output, Recent Results, Med Rec Status and Cardiac Rhythm SB.

## 2020-12-04 NOTE — PROGRESS NOTES
Patient placed on BiPAP QHS. Nurse is aware. Will continue to monitor. 12/03/20 2126   Oxygen Therapy   O2 Sat (%) 96 %   Pulse via Oximetry 60 beats per minute   O2 Device BIPAP  (V60)   FIO2 (%) 40 %   Respiratory   Respiratory (WDL) X   Respiratory Pattern Tachypneic   Chest/Tracheal Assessment Chest expansion, symmetrical   Breath Sounds Bilateral Diminished   Breath Sounds Left Diminished   Breath Sounds Right Diminished   Cough Non-productive;Dry   CPAP/BIPAP   CPAP/BIPAP Start/Stop On   Device Mode BIPAP;S/T   $$ Bipap Daily Yes   Mask Type and Size Full face; Large   Skin Condition intact   PIP Observed 15 cm H20   IPAP (cm H2O) 15 cm H2O   EPAP (cm H2O) 8 cm H2O   Inspiratory Time (sec) 0.9 seconds   Vt Spont (ml) 734 ml   Ve Observed (l/min) 24.1 l/min   Backup Rate 12   Total RR (Spontaneous) 34 breaths per minute   Insp Rise Time (sec) 5   Leak (Estimated) 22 L/min   Pt's Home Machine No   Biomedical Check Performed Yes   Settings Verified Yes   Alarm Settings   High Pressure 30   Low Pressure 5   Apnea 20   Low Ve 2   High Rate 50   Low Rate 8   Pulmonary Toilet   Pulmonary Toilet H. O.B elevated

## 2020-12-04 NOTE — PROGRESS NOTES
SPEECH PATHOLOGY NOTE:    Speech therapy consult received and appreciated. Patient currently on Bipap and unable to participate in dysphagia evaluation. Will continue to follow with plans to evaluate as respiratory status improves. ADDENDUM: Hold dysphagia evaluation as patient continues to require Bipap. Will follow up for evaluation tomorrow if respiratory status improves.      Zach [address] CCC-SLP

## 2020-12-04 NOTE — INTERDISCIPLINARY ROUNDS
Interdisciplinary team rounds were held 12/4/2020 with the following team members:Care Management, Nursing, Nurse Practitioner, Nutrition, Occupational Therapy, Palliative Care, Pastoral Care, Patient Relations, Pharmacy, Physical Therapy, Physician, Respiratory Therapy, Speech Therapy and Clinical Coordinator and the patient. Plan of care discussed. See clinical pathway and/or care plan for interventions and desired outcomes.

## 2020-12-05 PROBLEM — T79.7XXA SUBCUTANEOUS EMPHYSEMA (HCC): Status: ACTIVE | Noted: 2020-01-01

## 2020-12-05 PROBLEM — R41.0 DELIRIUM: Status: ACTIVE | Noted: 2020-01-01

## 2020-12-05 PROBLEM — J98.2 PNEUMOMEDIASTINUM (HCC): Status: ACTIVE | Noted: 2020-01-01

## 2020-12-05 NOTE — PROGRESS NOTES
Called and spoke with primary rn pt has 2 good working pivs will hold off on picc today and check back tomorrow.

## 2020-12-05 NOTE — PROGRESS NOTES
Speech Pathology Note:    Per MD notation this AM, patient continues to require BiPAP therefore bedside swallow evaluation is not indicated at this time. Will continue to follow for improvement/ability to participate. Russell Hyatt.  MS Evelyn, CCC-SLP

## 2020-12-05 NOTE — PROGRESS NOTES
Critical Care Daily Progress Note: 12/5/2020    Bharat Gama   Admission Date: 12/2/2020         The patient's chart is reviewed and the patient is discussed with the staff.      70 y.o.  male presents with acute hypoxemia respiratory  failure secondary to COVID-19. The patient arrived via EMS from home with reports on acute onset of dyspnea. He was recently in the ED on 11/27/2020 secondary to gait instability and subsequent fall. He is currently being worked up for [employer]. Upon arrival to the Ed, the patient's SPO2 levels were in the 50's on RA. He was placed on oxygent therapy with escalation to BiPAP. Reports no known COVID exposure. The patient has minimal medical history because he does not routinely see a PCP and only had has first office appointment on 10/8/2020 with Dr. Navneet Del Rosario due to neurological changes. He is a previous smoker but quit in 2007. The patient's son is the primary historian. The family wasn't aware that the patient could have COVID. The son suggested that the patient was having some dyspnea x 1 week. Also suggested he has been bedridden for nearly a week due to neurological changes. The rapid COVID-19 test was completed in the ED and was positive. Lactic acid level is 1.3, procal 0.28, WBC 7.3     Subjective:     Remains on bipap the last 24 hours with increased agitation and delirium overnight. He required to start IV morphine and lorazepam with some improvement. He pulled his IV site his agitation.     Current Facility-Administered Medications   Medication Dose Route Frequency    dexamethasone (DECADRON) 4 mg/mL injection 6 mg  6 mg IntraVENous DAILY    morphine 10 mg/ml injection 5 mg  5 mg IntraVENous Q4H PRN    influenza vaccine 2020-21 (6 mos+)(PF) (FLUARIX/FLULAVAL/FLUZONE QUAD) injection 0.5 mL  0.5 mL IntraMUSCular PRIOR TO DISCHARGE    enoxaparin (LOVENOX) injection 30 mg  30 mg SubCUTAneous Q12H    remdesivir 100 mg in 0.9% sodium chloride 250 mL IVPB  100 mg IntraVENous Q24H    dexmedeTOMidine (PRECEDEX) 400 mcg in 0.9% sodium chloride 100 mL infusion  0.1-1.5 mcg/kg/hr IntraVENous TITRATE    sodium chloride (NS) flush 5-40 mL  5-40 mL IntraVENous Q8H    sodium chloride (NS) flush 5-40 mL  5-40 mL IntraVENous PRN    0.9% sodium chloride infusion 250 mL  250 mL IntraVENous PRN    insulin regular (NOVOLIN R, HUMULIN R) injection   SubCUTAneous ACB&D    dextrose 5% and 0.9% NaCl infusion  50 mL/hr IntraVENous CONTINUOUS    cefTRIAXone (ROCEPHIN) 1 g in 0.9% sodium chloride (MBP/ADV) 50 mL MBP  1 g IntraVENous Q24H    azithromycin (ZITHROMAX) 500 mg in 0.9% sodium chloride 250 mL (VIAL-MATE)  500 mg IntraVENous Q24H       Review of Systems    Constitutional:  negative for fever, chills, sweats  Cardiovascular:  negative for chest pain, palpitations, syncope, edema  Gastrointestinal:  negative for dysphagia, reflux, vomiting, diarrhea, abdominal pain, or melena  Neurologic:  negative for focal weakness, numbness, headache      Objective:     Vitals:    12/05/20 0721 12/05/20 0722 12/05/20 0723 12/05/20 0724   BP:       Pulse: 63 63 63 63   Resp: 27 28 27 27   Temp:       SpO2: 95% 95% 95% 95%   Weight:   202 lb 13.2 oz (92 kg)    Height:             Intake/Output Summary (Last 24 hours) at 12/5/2020 0849  Last data filed at 12/5/2020 8880  Gross per 24 hour   Intake 2302.33 ml   Output 100 ml   Net 2202.33 ml       Physical Exam:          Constitutional:  the patient is well developed and in no acute distress  EENMT:  Sclera clear, pupils equal, oral mucosa moist. Positive crepitus in the neck area and upper chest    Respiratory: crackles bilateral  Cardiovascular:  RRR without M,G,R  Gastrointestinal: soft and non-tender; with positive bowel sounds. Musculoskeletal: warm without cyanosis. There is no lower extremity edema. Skin:  no jaundice or rashes, no wounds .   Neurologic: no gross neuro deficits     Psychiatric:  alert and oriented x 3    LINES:  peripheral    DRIPS:  none    CXR:           LAB  Recent Labs     12/05/20  0525 12/04/20  1600 12/04/20  0903 12/03/20  1627 12/03/20  0847   GLUCPOC 113* 122* 105* 145* 150*      Recent Labs     12/05/20  0415 12/04/20  0404 12/03/20  0425   WBC 9.6 11.9* 4.5   HGB 10.8* 12.6* 11.4*   HCT 34.1* 39.1* 35.9*    209 164     Recent Labs     12/05/20  0415 12/04/20  0404 12/03/20  0425 12/02/20  1348   * 147* 146* 141   K 2.9* 4.1 3.5 4.3   * 117* 115* 107   CO2 21 28 24 29   * 116* 686* 109*   BUN 24* 30* 28* 33*   CREA 0.94 0.78* 0.93 1.03   MG 2.1 3.0* 2.5*  --    PHOS 2.1* 2.6 3.5  --    CA 6.1* 8.2* 7.1* 8.6   ALB  --   --   --  2.8*   TBILI  --   --   --  0.6   ALT  --   --   --  30     Recent Labs     12/05/20  0256 12/04/20  0347 12/03/20  0310   PHI 7.45 7.44 7.40   PCO2I 31.0* 32.0* 37.9   PO2I 61* 58* 110*   HCO3I 21.6* 21.7* 23.5     Recent Labs     12/02/20  1621 12/02/20  1348   LAC 1.0 1.3     Recent Labs     12/05/20  0256 12/04/20  0347 12/03/20  0310   PHI 7.45 7.44 7.40   PCO2I 31.0* 32.0* 37.9   PO2I 61* 58* 110*   HCO3I 21.6* 21.7* 23.5       Patient Active Problem List   Diagnosis Code    Acute respiratory failure with hypoxia ([facility]) J96.01    COVID-19 U07.1    Acute hypoxemic respiratory failure due to COVID-19 ([facility]) U07.1, J96.01    Acute respiratory distress syndrome (ARDS) due to COVID-19 virus (HCC) U07.1, J80    Delirium R41.0    Pneumomediastinum (HCC) J98.2    Subcutaneous emphysema (HCC) T79. 7XXA         Assessment:  (Medical Decision Making)     Hospital Problems  Date Reviewed: 10/9/2020          Codes Class Noted POA    Delirium ICD-10-CM: R41.0  ICD-9-CM: 780.09  12/5/2020 Yes        Pneumomediastinum ([facility] [address] ICD-10-CM: J98.2  ICD-9-CM: 518.1  12/5/2020 No        Subcutaneous emphysema (HCC) ICD-10-CM: T79. 7XXA  ICD-9-CM: 958.7  12/5/2020 No        Acute respiratory failure with hypoxia [facility]) ICD-10-CM: J96.01  ICD-9-CM: 518.81  12/2/2020 Yes [id number] ICD-10-CM: U07.1  ICD-9-CM: 079.89  12/2/2020 Yes        Acute hypoxemic respiratory failure due to COVID-19 [facility]) ICD-10-CM: U07.1, J96.01  ICD-9-CM: 518.81, 079.89, 799.02  12/2/2020 Yes        Acute respiratory distress syndrome (ARDS) due to COVID-19 virus [facility]) ICD-10-CM: U07.1, J80  ICD-9-CM: 518.82, 079.89  12/2/2020 Yes              Plan:  (Medical Decision Making)     --Start Precedex drip to control delirium adequately  --Adjust BiPAP to maintain adequate oxygenation  --Monitor chest x-ray closely  -- remdesivire day 3  --Day 4 zithromax and rocephine  --Decadron day 4  --Continue DVT and GI prophylaxis  --PICC  --BMP    The patient is critically ill with respiratory failure, circulatory failure and requires high complexity decision making for assessment and support including frequent ventilator adjustment , frequent evaluation and titration of therapies , application of advanced monitoring technologies and extensive interpretation of multiple databases  Time devoted to patient care services described in this note- 15 min face to face/ 20 min total evaluation time    Cumulative time devoted to patient care services by me for day of service -35 min       More than 50% of the time documented was spent in face-to-face contact with the patient and in the care of the patient on the floor/unit where the patient is located.     Kayden Moore MD

## 2020-12-06 PROBLEM — E87.0 HYPERNATREMIA: Status: ACTIVE | Noted: 2020-01-01

## 2020-12-06 NOTE — PROGRESS NOTES
Speech Pathology Note:    Per MD notation this AM, patient continues to require increasing respiratory support; therefore bedside swallow evaluation is not indicated at this time. Will continue to follow for improvement/ability to participate. Yamilet Mg.  MS Evelyn, CCC-SLP

## 2020-12-06 NOTE — PROGRESS NOTES
Critical Care Daily Progress Note: 12/6/2020    Melita [place]   Admission Date: 12/2/2020         The patient's chart is reviewed and the patient is discussed with the staff.      70 y.o.  male presents with acute hypoxemia respiratory  failure secondary to COVID-19. The patient arrived via EMS from home with reports on acute onset of dyspnea. He was recently in the ED on 11/27/2020 secondary to gait instability and subsequent fall. He is currently being worked up for [facility]. Upon arrival to the Ed, the patient's SPO2 levels were in the 50's on RA. He was placed on oxygent therapy with escalation to BiPAP. Reports no known COVID exposure. The patient has minimal medical history because he does not routinely see a PCP and only had has first office appointment on 10/8/2020 with Dr. Alejandra Elizondo due to neurological changes. He is a previous smoker but quit in 2007. The patient's son is the primary historian. The family wasn't aware that the patient could have COVID. The son suggested that the patient was having some dyspnea x 1 week. Also suggested he has been bedridden for nearly a week due to neurological changes. The rapid COVID-19 test was completed in the ED and was positive.  Lactic acid level is 1.3, procal 0.28, WBC 7.3      Subjective:   Ongoing agitation now maxed out on precedex  Also on bipap 100% fio2 with RR in low 30s    Current Facility-Administered Medications   Medication Dose Route Frequency    insulin lispro (HUMALOG) injection   SubCUTAneous Q6H    dexamethasone (DECADRON) 4 mg/mL injection 6 mg  6 mg IntraVENous DAILY    morphine 10 mg/ml injection 5 mg  5 mg IntraVENous Q4H PRN    influenza vaccine 2020-21 (6 mos+)(PF) (FLUARIX/FLULAVAL/FLUZONE QUAD) injection 0.5 mL  0.5 mL IntraMUSCular PRIOR TO DISCHARGE    enoxaparin (LOVENOX) injection 30 mg  30 mg SubCUTAneous Q12H    remdesivir 100 mg in 0.9% sodium chloride 250 mL IVPB  100 mg IntraVENous Q24H    dexmedeTOMidine (PRECEDEX) 400 mcg in 0.9% sodium chloride 100 mL infusion  0.1-1.5 mcg/kg/hr IntraVENous TITRATE    sodium chloride (NS) flush 5-40 mL  5-40 mL IntraVENous Q8H    sodium chloride (NS) flush 5-40 mL  5-40 mL IntraVENous PRN    0.9% sodium chloride infusion 250 mL  250 mL IntraVENous PRN    insulin regular (NOVOLIN R, HUMULIN R) injection   SubCUTAneous ACB&D    dextrose 5% and 0.9% NaCl infusion  50 mL/hr IntraVENous CONTINUOUS    cefTRIAXone (ROCEPHIN) 1 g in 0.9% sodium chloride (MBP/ADV) 50 mL MBP  1 g IntraVENous Q24H    azithromycin (ZITHROMAX) 500 mg in 0.9% sodium chloride 250 mL (VIAL-MATE)  500 mg IntraVENous Q24H       Review of Systems   Unobtainable due to patient status. Objective:     Vitals:    12/06/20 0618 12/06/20 0700 12/06/20 0715 12/06/20 0741   BP: (!) 183/99 112/68 118/73    Pulse: 79 (!) 59 (!) 57    Resp: (!) 39 (!) 32 30    Temp:       SpO2: 90% 93% 93% 94%   Weight:       Height:             Intake/Output Summary (Last 24 hours) at 12/6/2020 1004  Last data filed at 12/6/2020 0541  Gross per 24 hour   Intake 2456.19 ml   Output 2610 ml   Net -153.81 ml       Physical Exam:          Constitutional:  the patient is well developed and in no acute distress  EENMT:  Sclera clear, pupils equal, oral mucosa moist  Respiratory:  Crackles ,crepitance over chest wall  Cardiovascular:  RRR without M,G,R  Gastrointestinal: soft and non-tender; with positive bowel sounds. Musculoskeletal: warm without cyanosis. There is no lower extremity edema.   Skin:  no jaundice or rashes, no wounds   Neurologic: no gross neuro deficits     Psychiatric:  sedated    LINES:  picc    DRIPS:  precedex    CXR:   pending    LAB  Recent Labs     12/06/20  0831 12/06/20  0532 12/05/20  2346 12/05/20  1844 12/05/20  1739   GLUCPOC 196* 126* 144* 157* 196*      Recent Labs     12/05/20  0415 12/04/20  0404   WBC 9.6 11.9*   HGB 10.8* 12.6*   HCT 34.1* 39.1*    209     Recent Labs 12/05/20  1120 12/05/20  0415 12/04/20  0404   * 153* 147*   K 5.2* 2.9* 4.1   * 125* 117*   CO2 25 21 28   * 917* 116*   BUN 32* 24* 30*   CREA 0.94 0.94 0.78*   MG  --  2.1 3.0*   PHOS  --  2.1* 2.6   CA 8.3 6.1* 8.2*     Recent Labs     12/06/20  0253 12/05/20  0256 12/04/20  0347   PHI 7.40 7.45 7.44   PCO2I 33.6* 31.0* 32.0*   PO2I 66* 61* 58*   HCO3I 20.9* 21.6* 21.7*     No results for input(s): LCAD, LAC in the last 72 hours. Recent Labs     12/06/20 0253 12/05/20 0256 12/04/20 0347   PHI 7.40 7.45 7.44   PCO2I 33.6* 31.0* 32.0*   PO2I 66* 61* 58*   HCO3I 20.9* 21.6* 21.7*       Patient Active Problem List   Diagnosis Code    Acute respiratory failure with hypoxia (HCC) J96.01    COVID-19 U07.1    Acute hypoxemic respiratory failure due to COVID-19 (HCC) U07.1, J96.01    Acute respiratory distress syndrome (ARDS) due to COVID-19 virus (HCC) U07.1, J80    Delirium R41.0    Pneumomediastinum (HCC) J98.2    Subcutaneous emphysema ([facility]) T79. 7XXA    Hypernatremia E87.0         Assessment:  (Medical Decision Making)     Hospital Problems  Date Reviewed: 10/9/2020          Codes Class Noted POA    Hypernatremia ICD-10-CM: E87.0  ICD-9-CM: 276.0  12/6/2020 Unknown    worse    Delirium ICD-10-CM: R41.0  ICD-9-CM: 780.09  12/5/2020 Yes    precedex    Pneumomediastinum ([address] ICD-10-CM: J98.2  ICD-9-CM: 518.1  12/5/2020 No        Subcutaneous emphysema (HCC) ICD-10-CM: T79. 7XXA  ICD-9-CM: 958.7  12/5/2020 No        Acute respiratory failure with hypoxia [facility]) ICD-10-CM: J96.01  ICD-9-CM: 518.81  12/2/2020 Yes    Critically ill    COVID-19 ICD-10-CM: U07.1  ICD-9-CM: 079.89  12/2/2020 Yes        Acute hypoxemic respiratory failure due to COVID-19 [facility]) ICD-10-CM: U07.1, J96.01  ICD-9-CM: 518.81, 079.89, 799.02  12/2/2020 Yes        Acute respiratory distress syndrome (ARDS) due to COVID-19 virus [facility]) ICD-10-CM: U07.1, J80  ICD-9-CM: 518.82, 079.89  12/2/2020 Yes              Plan: (Medical Decision Making)   1     Increase free h2o  2     Will probably need intubation and vent support in next 24 hours  -3-  Stat cxr  4   remdesivire day 4  5  Day 5 rocephine /zithromax    Critical care time 39 minutes so far  More than 50% of the time documented was spent in face-to-face contact with the patient and in the care of the patient on the floor/unit where the patient is located.     Jenna Mcduffie MD

## 2020-12-07 PROBLEM — J93.9 PNEUMOTHORAX ON LEFT: Status: ACTIVE | Noted: 2020-01-01

## 2020-12-07 PROBLEM — J93.9 PNEUMOTHORAX ON RIGHT: Status: ACTIVE | Noted: 2020-01-01

## 2020-12-07 NOTE — PROGRESS NOTES
SPEECH PATHOLOGY NOTE:    Speech therapy consulted on 12/4/20 for dysphagia evaluation. Patient has been unable to participate in evaluation since that time due to respiratory status with continued need for Bipap. Speech to sign off for now. Please consider re-consult as respiratory status improves.      Zach Summers [address] CCC-SLP

## 2020-12-07 NOTE — PROGRESS NOTES
Bedside and Verbal shift change report given to Gissel SPICER and Odessa Andrews (oncoming nurse) by Minda Black (offgoing nurse). Report included the following information SBAR, Kardex, ED Summary, Intake/Output, MAR, Recent Results and Cardiac Rhythm NSR.

## 2020-12-07 NOTE — PROGRESS NOTES
Critical Care Daily Progress Note: 12/7/2020    Riley [place]   Admission Date: 12/2/2020         The patient's chart is reviewed and the patient is discussed with the staff.      70 y.o.  male presents with acute hypoxemia respiratory  failure secondary to COVID-19. The patient arrived via EMS from home with reports on acute onset of dyspnea. He was recently in the ED on 11/27/2020 secondary to gait instability and subsequent fall. He is currently being worked up for [email or web address]. Upon arrival to the Ed, the patient's SPO2 levels were in the 50's on RA. He was placed on oxygent therapy with escalation to BiPAP. Reports no known COVID exposure. The patient has minimal medical history because he does not routinely see a PCP and only had has first office appointment on 10/8/2020 with Dr. Elma Steele due to neurological changes. He is a previous smoker but quit in 2007. The patient's son is the primary historian. The family wasn't aware that the patient could have COVID. The son suggested that the patient was having some dyspnea x 1 week. Also suggested he has been bedridden for nearly a week due to neurological changes. The rapid COVID-19 test was completed in the ED and was positive.  Lactic acid level is 1.3, procal 0.28, WBC 7.3      Subjective:   Maxed out on precedex  Also on bipap 100% fio2 with RR in low 30s, day #4  Bilateral PTX and SQ emphysema noted    Current Facility-Administered Medications   Medication Dose Route Frequency    central line flush (saline) syringe 10 mL  10 mL InterCATHeter Q8H    insulin lispro (HUMALOG) injection   SubCUTAneous Q6H    dexamethasone (DECADRON) 4 mg/mL injection 6 mg  6 mg IntraVENous DAILY    morphine 10 mg/ml injection 5 mg  5 mg IntraVENous Q4H PRN    influenza vaccine 2020-21 (6 mos+)(PF) (FLUARIX/FLULAVAL/FLUZONE QUAD) injection 0.5 mL  0.5 mL IntraMUSCular PRIOR TO DISCHARGE    enoxaparin (LOVENOX) injection 30 mg  30 mg SubCUTAneous Q12H  remdesivir 100 mg in 0.9% sodium chloride 250 mL IVPB  100 mg IntraVENous Q24H    dexmedeTOMidine (PRECEDEX) 400 mcg in 0.9% sodium chloride 100 mL infusion  0.1-1.5 mcg/kg/hr IntraVENous TITRATE    sodium chloride (NS) flush 5-40 mL  5-40 mL IntraVENous Q8H    sodium chloride (NS) flush 5-40 mL  5-40 mL IntraVENous PRN    0.9% sodium chloride infusion 250 mL  250 mL IntraVENous PRN    insulin regular (NOVOLIN R, HUMULIN R) injection   SubCUTAneous ACB&D    dextrose 5% and 0.9% NaCl infusion  50 mL/hr IntraVENous CONTINUOUS    cefTRIAXone (ROCEPHIN) 1 g in 0.9% sodium chloride (MBP/ADV) 50 mL MBP  1 g IntraVENous Q24H       Review of Systems   Unobtainable due to patient status. Objective:     Vitals:    12/07/20 0716 12/07/20 0731 12/07/20 0744 12/07/20 0746   BP: 131/74 135/74  130/74   Pulse: (!) 56 (!) 56  64   Resp: 29 28  (!) 37   Temp:   98.3 °F (36.8 °C)    SpO2: 90% 91% 92% (!) 89%   Weight:       Height:             Intake/Output Summary (Last 24 hours) at 12/7/2020 1980  Last data filed at 12/7/2020 0756  Gross per 24 hour   Intake 1896.05 ml   Output 1710 ml   Net 186.05 ml       Physical Exam:          Constitutional:  the patient is well developed and in no acute distress  EENMT:  Sclera clear, pupils equal, oral mucosa moist  Respiratory:  Crackles ,crepitance over chest wall  Cardiovascular:  RRR without M,G,R  Gastrointestinal: soft and non-tender; with positive bowel sounds. Musculoskeletal: warm without cyanosis. There is no lower extremity edema.   Skin:  no jaundice or rashes, no wounds   Neurologic: no gross neuro deficits     Psychiatric:  sedated    LINES:  picc  Bilateral chest tubes 28F 12/07/20  Arterial line R radial 12/7/2020    DRIPS:  precedex    CXR:       LAB  Recent Labs     12/07/20  0748 12/07/20  0549 12/07/20  0008 12/06/20  1753 12/06/20  1622   GLUCPOC 124* 112* 200* 137* 138*      Recent Labs     12/06/20  1114 12/05/20  0415   WBC 10.5 9.6   HGB 13.1* 10.8* HCT 39.8* 34.1*   * 155     Recent Labs     12/06/20  1114 12/05/20  1120 12/05/20  0415   * 150* 153*   K 4.3 5.2* 2.9*   * 120* 125*   CO2 25 25 21   * 116* 917*   BUN 37* 32* 24*   CREA 0.97 0.94 0.94   MG  --   --  2.1   PHOS  --   --  2.1*   CA 8.0* 8.3 6.1*     Recent Labs     12/07/20  0238 12/06/20  0253 12/05/20  0256   PHI 7.44 7.40 7.45   PCO2I 33.4* 33.6* 31.0*   PO2I 64* 66* 61*   HCO3I 22.5 20.9* 21.6*     No results for input(s): LCAD, LAC in the last 72 hours. Recent Labs     12/07/20  0238 12/06/20  0253 12/05/20  0256   PHI 7.44 7.40 7.45   PCO2I 33.4* 33.6* 31.0*   PO2I 64* 66* 61*   HCO3I 22.5 20.9* 21.6*       Patient Active Problem List   Diagnosis Code    Acute respiratory failure with hypoxia (HCC) J96.01    COVID-19 U07.1    Acute hypoxemic respiratory failure due to COVID-19 (HCC) U07.1, J96.01    Acute respiratory distress syndrome (ARDS) due to COVID-19 virus (HCC) U07.1, J80    Delirium R41.0    Pneumomediastinum (HCC) J98.2    Subcutaneous emphysema (HCC) T79. 7XXA    Hypernatremia E87.0         Assessment:  (Medical Decision Making)     Patient Active Problem List   Diagnosis Code    Acute respiratory failure with hypoxia (HCC) J96.01    COVID-19 U07.1    Acute hypoxemic respiratory failure due to COVID-19 (HCC) U07.1, J96.01    Acute respiratory distress syndrome (ARDS) due to COVID-19 virus (HCC) U07.1, J80    Delirium R41.0    Pneumomediastinum (HCC) J98.2    Subcutaneous emphysema (HCC) T79. 7XXA    Hypernatremia E87.0         Plan:  (Medical Decision Making)     Hospital Problems  Date Reviewed: 10/9/2020          Codes Class Noted POA    Hypernatremia ICD-10-CM: E87.0  ICD-9-CM: 276.0  12/6/2020 Unknown    Add Free water through NGT and engage nutrition for electrolyte replacement.   Monitor daily    Delirium ICD-10-CM: R41.0  ICD-9-CM: 780.09  12/5/2020 Yes    Now sedated and intubated    Pneumomediastinum ([facility] [address] ICD-10-CM: J98.2  ICD-9-CM: 518.1  12/5/2020 No    Chest tubes placed    Subcutaneous emphysema (HCC) ICD-10-CM: T79. 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    Acute respiratory failure with hypoxia (HCC) ICD-10-CM: J96.01  ICD-9-CM: 518.81  12/2/2020 Yes    Critically ill    Day 6 rocephine /zithromax- will stop after tomorrow's dosing    COVID-19 ICD-10-CM: U07.1  ICD-9-CM: 079.89  12/2/2020 Yes        Acute hypoxemic respiratory failure due to COVID-19 [facility]) ICD-10-CM: U07.1, J96.01  ICD-9-CM: 518.81, 079.89, 799.02  12/2/2020 Yes    ARDS with P:F  Of 63  Low Tidal Volume, high PEEP   Keep PaO2 at or above 60 mmHg  FiO2 goal 60% or less  PLp 32 or less  Daily abg- ARTERIAL LINE PLACED  Prone if needed  Flolan if proning ineffective      Acute respiratory distress syndrome (ARDS) due to COVID-19 virus [facility]) ICD-10-CM: U07.1, J80  ICD-9-CM: 518.82, 079.89  12/2/2020 Yes    remdesivir day 5  S/P Convalescent plasma-12/3/20  On Decadron day #4  Respiratory status worse, required intubation see above. Critical care time 45 minutes so far      Peter Charter Spouse [phone]    Attempted to call no answer, no voicemail    More than 50% of the time documented was spent in face-to-face contact with the patient and in the care of the patient on the floor/unit where the patient is located.     Izabel Cohen MD

## 2020-12-07 NOTE — PROGRESS NOTES
PICC Placement Note    PRE-PROCEDURE VERIFICATION  Correct Procedure: yes. Time out completed with assistant Lise Amaya RN, VAT and all persons present in agreement with time out. Correct Site:  yes  Temperature: Temp: 98 °F (36.7 °C), Temperature Source: Temp Source: Axillary  Recent Labs     12/06/20  1114   BUN 37*   CREA 0.97   *   WBC 10.5     Allergies: Patient has no known allergies. Education materials for [facility] given to patient or family. PROCEDURE DETAIL  A triple lumen PICC line was started for vascular access, antibiotic therapy and desire for reliable access. The following documentation is in addition to the PICC properties in the lines/airways flowsheet :  Lot #: [id number]  xylocaine used: yes  Mid-Arm Circumference: 30 (cm)  Internal Catheter Length: 47 (cm)  Internal Catheter Total Length: 47 (cm)  Vein Selection for PICC:left basilic  Central Line Bundle followed yes  Complication Related to Insertion: none  Both the insertion guidewire and ECG guidewire were removed intact all ports have positive blood return and were flush well with normal saline. The location of the tip of the PICC is verified using ECG technology. The tip is in the SVC per ECG reading. See image below.      Line is okay to use: yes      Lennart Scheuermann, RN, VAT

## 2020-12-07 NOTE — PROGRESS NOTES
Interdisciplinary team rounds were held 12/7/2020 with the following team members:Care Management, Nursing, Nutrition, Pastoral Care, Pharmacy, Physician, Respiratory Therapy, Speech Therapy and Clinical Coordinator. Plan of care discussed. See clinical pathway and/or care plan for interventions and desired outcomes.

## 2020-12-07 NOTE — PROCEDURES
Procedure: elective/emergent intubation    Indication: respiratory insufficiency    Anesthesia- Rapid sequence:  Fentanyl 100mcg, Etomidate  20mg,   Paralysis: Succinylcholine 90 mg    The patient underwent preoxygenation with 100% FiO2 for 5 min on BiPAP  . Fentanyl was then given and after 3 min, etomidate and succnylcholine was given sequentially in rapid IV push. A 4.0MAC, glidescope blade  laryngoscope was used to visualize the epiglottis and vocal chords. After positive identification of the vocal chords, an 8.0 ET tube was placed into the trachea with direct visualization. The tube was seen passing through the vocal chords without difficulty. CO2 colorimetry was employed immediately to verify tube in airway with /without appropriate color change indicating detection/lack of CO2. Water vapor was seen within the ET tube, and auscultation of the abdomen revealed no bubbling souds. Auscultation  and inspection of the chest after intubation showed symmetric chest excursion and symmetric air entry bilaterally. Chest X-ray showed proper position of ETT and bilateral chest tubes    The patient has been placed on a mechanical ventilator. There were no complications.     Yessy Boss MD

## 2020-12-07 NOTE — PROGRESS NOTES
12/07/20 1145   Patient Observations   Pulse (Heart Rate) (!) 59   Resp Rate 24   O2 Sat (%) 96 %   Airway Clearance   Suction ET Tube;Oral   Suction Device Nehemiahkauer; Inline suction catheter   Sputum Amount Small   Sputum Color/Odor Bloody; White   Sputum Consistency Thin   Ventilator Initiate/Discontinue   Ventilator Initiate Yes   Bio-Med ID #   (pre use done)   Vent Settings   FIO2 (%) 100 %   CMV Rate Set 24   PC Set 18   PEEP/VENT (cm H2O) 12 cm H20   Insp Time (sec) 0.95 sec   Flow Trigger 2   Ventilator Measurements   Vt Exhaled (Machine Breath) (ml) 548 ml   Ve Observed (l/min) 15.2 l/min   PIP Observed (cm H2O) 32 cm H2O   MAP (cm H2O) 18   Auto PEEP Observed (cm H2O) 0 cm H2O   Safety & Alarms   Pressure Max 50 cm H2O   Pressure Min 2 cm H2O   Ve Min 2   Ve Max 22   RR Min 5   RR Max 50   Ambu Bag Yes   Ambu Mask Yes   Vent Method/Mode   Ventilation Method Conventional   Ventilator Mode Pressure control   $$ Ventilator Initial Initial Vent Day

## 2020-12-07 NOTE — ACP (ADVANCE CARE PLANNING)
Advance Care Planning     Advance Care Planning Activator (Inpatient)  Conversation Note      Date of ACP Conversation: 12/07/20     Conversation Conducted with:  Patient currently intubated /vent -MD spoke with wife. ACP Activator: Rogerio Hernandez RN    *When Decision Maker makes decisions on behalf of the incapacitated patient: Decision Maker is asked to consider and make decisions based on patient values, known preferences, or best interests. Health Care Decision Maker:    Current Designated Health Care Decision Maker:   Primary Decision Maker: Imer Avila - Spouse - [phone]  (If there is a valid Health Care Decision Maker named in the [address] [employer]\" box in the ACP activity, but it is not visible above, be sure to open that field and then select the health care decision maker relationship (ie \"primary\") in the blank space to the right of the name.) Validate  this information as still accurate & up-to-date; edit [address] field as needed.)    Note: Assess and validate information in current ACP documents, as indicated. If no Decision Maker listed above or available through scanned documents, then:    Care Preferences    Ventilation: \"If you were in your present state of health and suddenly became very ill and were unable to breathe on your own, what would your preference be about the use of a ventilator (breathing machine) if it were available to you? \"      If patient would desire the use of a ventilator (breathing machine), answer \"yes\", if not \"no\":unable to answer self    \"If your health worsens and it becomes clear that your chance of recovery is unlikely, what would your preference be about the use of a ventilator (breathing machine) if it were available to you? \"     Would the patient desire the use of a ventilator (breathing machine)? Pt unable to answer currently.        Resuscitation  \"CPR works best to restart the heart when there is a sudden event, like a heart attack, in someone who is otherwise healthy. Unfortunately, CPR does not typically restart the heart for people who have serious health conditions or who are very sick. \"    \"In the event your heart stopped as a result of an underlying serious health condition, would you want attempts to be made to restart your heart (answer \"yes\" for attempt to resuscitate) or would you prefer a natural death (answer \"no\" for do not attempt to resuscitate)? \" pt unable to answer/legal NOK/spouse      NOTE: If the patient has a valid advance directive AND now provides care preference(s) that are inconsistent with that prior directive, advise the patient to consider either: creating a new advance directive that complies with state-specific requirements; or, if that is not possible, orally revoking that prior directive in accordance with state-specific requirements, which must be documented in the EHR. [] Yes  [x] No   Educated Patient / Ashlee Welch regarding differences between Advance Directives and portable DNR orders.     Length of ACP Conversation in minutes:      Conversation Outcomes:   [x] ACP discussion completed by MD with spouse.   [] Existing advance directive reviewed with patient; no changes to patient's previously recorded wishes     [] New Advance Directive completed   [] Portable Do Not Resuscitate prepared for Provider review and signature  [] POLST/POST/MOLST/MOST prepared for Provider review and signature      Follow-up plan:    [] Schedule follow-up conversation to continue planning  [] Referred individual to Provider for additional questions/concerns   [] Advised patient/agent/surrogate to review completed ACP document and update if needed with changes in condition, patient preferences or care setting     [] This note routed to one or more involved healthcare providers

## 2020-12-07 NOTE — PROCEDURES
PROCEDURE:  28F chest tube placement      INDICATION:  PTX on the R. Summary:    The R chest was prepped and draped in the usual fashion with chlorhexidine. The 5th and 6th IC space in there anterior mid axillary line was identified and marked with needle cap. The skin, subcutaneous tissue and rib along with the IC muscles were also anesthetised with a total of 10cc of 1% lidocaine. Sterile technique was utilized during the entire procedure. An incision was then made in the skin followed by blunt dissection to the IC space previously marked as noted above. A curved hemostat was then used to penetrate the chest with a gush of air present upon ontry into the chest indicating tension. Following this a 29 F chest tube was inserted into the chest with the aid of a  curved hemostat without complication. 1.0 silk suture was used to close the lateral edge of the wound and affix the chest tube in place. An atrium was then attached and the entire apparatus placed to -20 cm H2O of suction. Dressing was applied and chest tube drainage affixed to the chest with silk tape. There were no complications.     EBL:2 ml    Yessy Boss MD.

## 2020-12-07 NOTE — PROGRESS NOTES
Chart reviewed and pt discussed in am IDR in CCU covid isolation. Now intubated/vent. Currently fentanyl and propofol gtts. CM will continue to follow for any assist and d/c POC when stable.

## 2020-12-07 NOTE — PROCEDURES
Using GE US and vascular probe, the R Radial artery was cannulated under direct vision, using seldinger technique with good back flow, flush  and arterial wave form. Catheter was sutured in place in usual fashion with an additional suture anchoring the arterial catheter itself. Transparent dressing applied.     Complications: none    EBL: 2 ml      Ashley Black MD.

## 2020-12-07 NOTE — PROCEDURES
PROCEDURE:  28F chest tube placement      INDICATION:  PTX on the L. Summary:    The L chest was prepped and draped in the usual fashion with chlorhexidine. The 5th and 6th IC space in there anterior mid axillary line was identified and marked with needle cap. The skin, subcutaneous tissue and rib along with the IC muscles were also anesthetised with a total of 10cc of 1% lidocaine. Sterile technique was utilized during the entire procedure. An incision was then made in the skin followed by blunt dissection to the IC space previously marked as noted above. A curved hemostat was then used to penetrate the chest with a gush of air present upon ontry into the chest indicating tension. Following this a 29 F chest tube was inserted into the chest with the aid of a  curved hemostat without complication. 1.0 silk suture was used to close the lateral edge of the wound and affix the chest tube in place. An atrium was then attached and the entire apparatus placed to -20 cm H2O of suction. Dressing was applied and chest tube drainage affixed to the chest with silk tape. There were no complications.     EBL:2 ml    Delroy Adame MD.

## 2020-12-08 NOTE — PROGRESS NOTES
Bedside shift change report given to  alysia SPICER by rankur.  Report included the following information SBAR, Kardex, ED Summary, Intake/Output, MAR, Recent Results and Cardiac Rhythm ST.

## 2020-12-08 NOTE — CONSULTS
Comprehensive Nutrition Assessment    Type and Reason for Visit: Initial, Consult(management of TF ([facility]))    Nutrition Recommendations/Plan:   Enteral Nutrition:  Initiate Nepro via OGT at 20ml/hour, progress by 10ml/hour every 4 hours to goal rate of 40ml/hour. Provide 3 packets of prosource tube feeding q day at 0900. Flush with 50 ml free water following administration. Water flush 120 every 4 hours. At goal will provide 1848 kcal, total of 2212 kcal/d with TF regimen and propofol (100% estimated calorie needs), 111 grams protein (100% estimated protein needs) and 1516ml free fluid (100% of needs met with TF + IVF). IV Fluids:  Per MD  Vitamin and Mineral Supplement Therapy:  Electrolyte management replacement protocol implemented. Labs:   EN labs: BMP daily, Mg and Phos MWF. Malnutrition Assessment:  Malnutrition Status: At risk for malnutrition (specify)(NPO x 5 days. 9% weight loss within 3 months PTA.)    Nutrition Assessment:   Nutrition History: Unable to obtain nutrition history d/t isolation status. Nutrition Background: Pt admitted with acute respiratory failure secondary to covid 19. No significant PMH. It is noted that pt was undergoing workup for potential Parkinson's disease PTA. Daily Update:  NPO since admission while on BIPAP. Pt intubated (12/7). R PTX s/p CT placement (12/7). Pt has finished remdesivir and convalescent plasma. Enteral access: OGT. Abdominal Status (last documented): Last BM 12/01/20. Pertinent Medications: 6 mg decadron q day, fentanyl, SSI, propofol   IVF: D5 @ 50 ml/hr  Pertinent Labs: Na 155, K+ 4.7, BUN 73, Cr 2.49  POC glucose 128,128 mg/dl (12/8)    WT / BMI 12/5/2020 12/2/2020 11/27/2020 10/8/2020   WEIGHT 202 lb 13.2 oz  215 lb 222 lb     Potential 20 pound, 9% weight loss within 3 months (clinically significant). Nutrition Related Findings:   NFPE deferred d/t isolation status.       Current Nutrition Therapies:  DIET NPO    Current Intake: Additional Caloric Sources: Propofol infusing @ 13.8 ml/hr @ time of assessment - providing ~364 kcal/d at current rate    Anthropometric Measures:  Height: 6' (182.9 cm)  Current Body Wt: 92 kg (202 lb 13.2 oz)(12/5 ), Weight source: Not specified  BMI: 27.5, Overweight (BMI 25.0-29. 9)     Ideal Body Wt: 178 lbs (81 kg), 113.9 %  Usual Body Wt: (222 lbs 10/2020 - potential 20 lb, 9% weight loss w), Percent weight change:            Estimated Daily Nutrient Needs: EEN may need to be adjusted if renal function continues to decline. Energy (kcal): 1250-1853(20-25 kcal/d) (Kcal/kg, Weight Used: Current)  Protein (g): 110-184 (1.2-2 gm/d ) Weight Used: (Current)  Fluid (ml/day): 1308-6536 (1 ml/kcal)    Nutrition Diagnosis:   · Inadequate oral intake related to impaired respiratory function as evidenced by NPO or clear liquid status due to medical condition, intubation    Nutrition Interventions:   Food and/or Nutrient Delivery: Continue NPO, Start tube feeding     Coordination of Nutrition Care: Continue to monitor while inpatient    Goals:      Meet >75% of estimated needs within 7 days. Nutrition Monitoring and Evaluation:      Food/Nutrient Intake Outcomes: Enteral nutrition intake/tolerance  Physical Signs/Symptoms Outcomes: Biochemical data, GI status, Hemodynamic status    Discharge Planning:     Too soon to determine    Naresh Mandel [address] [place] [address], [facility] [address] [place] [address], [facility] [address]

## 2020-12-08 NOTE — PROGRESS NOTES
Critical Care Daily Progress Note: 12/8/2020    Ian Holliday   Admission Date: 12/2/2020         The patient's chart is reviewed and the patient is discussed with the staff.      70 y.o.  male presents with acute hypoxemia respiratory  failure secondary to COVID-19. The patient arrived via EMS from home with reports on acute onset of dyspnea. He was recently in the ED on 11/27/2020 secondary to gait instability and subsequent fall. He is currently being worked up for [employer]. Upon arrival to the Ed, the patient's SPO2 levels were in the 50's on RA. He was placed on oxygent therapy with escalation to BiPAP. Reports no known COVID exposure. The patient has minimal medical history because he does not routinely see a PCP and only had has first office appointment on 10/8/2020 with Dr. Emil Walter due to neurological changes. He is a previous smoker but quit in 2007. The patient's son is the primary historian. The family wasn't aware that the patient could have COVID. The son suggested that the patient was having some dyspnea x 1 week. Also suggested he has been bedridden for nearly a week due to neurological changes. The rapid COVID-19 test was completed in the ED and was positive.  Lactic acid level is 1.3, procal 0.28, WBC 7.3   Intubated 12/8/20  Bilateral 28F chest tubes 12/8/20     Subjective:     CXR this AM with R PTX and chest tube in good position - suction increased to -40 cm H2O      Current Facility-Administered Medications   Medication Dose Route Frequency    fentaNYL in normal saline (pf) 25 mcg/mL infusion  0-200 mcg/hr IntraVENous TITRATE    propofol (DIPRIVAN) 10 mg/mL infusion  0-50 mcg/kg/min IntraVENous TITRATE    acetaminophen (TYLENOL) tablet 650 mg  650 mg Per NG tube Q4H PRN    central line flush (saline) syringe 10 mL  10 mL InterCATHeter Q8H    insulin lispro (HUMALOG) injection   SubCUTAneous Q6H    dexamethasone (DECADRON) 4 mg/mL injection 6 mg  6 mg IntraVENous DAILY    morphine 10 mg/ml injection 5 mg  5 mg IntraVENous Q4H PRN    influenza vaccine 2020-21 (6 mos+)(PF) (FLUARIX/FLULAVAL/FLUZONE QUAD) injection 0.5 mL  0.5 mL IntraMUSCular PRIOR TO DISCHARGE    enoxaparin (LOVENOX) injection 30 mg  30 mg SubCUTAneous Q12H    dexmedeTOMidine (PRECEDEX) 400 mcg in 0.9% sodium chloride 100 mL infusion  0.1-1.5 mcg/kg/hr IntraVENous TITRATE    sodium chloride (NS) flush 5-40 mL  5-40 mL IntraVENous Q8H    sodium chloride (NS) flush 5-40 mL  5-40 mL IntraVENous PRN    0.9% sodium chloride infusion 250 mL  250 mL IntraVENous PRN    insulin regular (NOVOLIN R, HUMULIN R) injection   SubCUTAneous ACB&D    dextrose 5% and 0.9% NaCl infusion  50 mL/hr IntraVENous CONTINUOUS    cefTRIAXone (ROCEPHIN) 1 g in 0.9% sodium chloride (MBP/ADV) 50 mL MBP  1 g IntraVENous Q24H       Review of Systems   Unobtainable due to patient status. Objective:     Vitals:    12/08/20 0500 12/08/20 0601 12/08/20 0714 12/08/20 0745   BP:       Pulse: (!) 101 98 98    Resp: 24 24 24    Temp:    98.8 °F (37.1 °C)   SpO2: 100% 100% 99%    Weight:       Height:             Intake/Output Summary (Last 24 hours) at 12/8/2020 0852  Last data filed at 12/8/2020 6753  Gross per 24 hour   Intake 2666.66 ml   Output 1025 ml   Net 1641.66 ml     Ventilator Settings  Mode FIO2 Rate Tidal Volume Pressure PEEP   Pressure control  85 %          12 cm H20      Peak airway pressure: 31 cm H2O   Minute ventilation: 14 l/min     Physical Exam:          Constitutional:  Intubated and sedated  EENMT:  Sclera clear, pupils equal, oral mucosa moist  Respiratory:  Crackles ,crepitance over chest wall  Cardiovascular:  RRR without M,G,R  Gastrointestinal: soft and non-tender; with positive bowel sounds. Musculoskeletal: warm without cyanosis. There is no lower extremity edema.   Skin:  no jaundice or rashes, no wounds   Neurologic: no gross neuro deficits     Psychiatric: sedated    LINES:  picc  Bilateral chest tubes 28F 12/07/20  Arterial line R radial 12/7/2020    DRIPS:  precedex    CXR:             LAB  Recent Labs     12/08/20  0741 12/08/20  0534 12/07/20  2332 12/07/20  2123 12/07/20  1654   GLUCPOC 128* 128* 78 138* 99      Recent Labs     12/06/20  1114   WBC 10.5   HGB 13.1*   HCT 39.8*   *     Recent Labs     12/07/20  1429 12/06/20  1114 12/05/20  1120   * 155* 150*   K  --  4.3 5.2*   CL  --  125* 120*   CO2  --  25 25   GLU  --  143* 116*   BUN  --  37* 32*   CREA  --  0.97 0.94   CA  --  8.0* 8.3     Recent Labs     12/08/20  0306 12/07/20  1112 12/07/20  0238   PHI 7.34* 7.33* 7.44   PCO2I 43.8 45.8* 33.4*   PO2I 92 63* 64*   HCO3I 23.4 24.0 22.5     No results for input(s): LCAD, LAC in the last 72 hours. Recent Labs     12/08/20  0306 12/07/20  1112 12/07/20  0238   PHI 7.34* 7.33* 7.44   PCO2I 43.8 45.8* 33.4*   PO2I 92 63* 64*   HCO3I 23.4 24.0 22.5       Patient Active Problem List   Diagnosis Code    Acute respiratory failure with hypoxia ([facility]) J96.01    COVID-19 U07.1    Acute hypoxemic respiratory failure due to COVID-19 ([facility]) U07.1, J96.01    Acute respiratory distress syndrome (ARDS) due to COVID-19 virus ([facility]) U07.1, J80    Delirium R41.0    Pneumomediastinum ([facility]) J98.2    Subcutaneous emphysema ([facility]) T79. 7XXA    Hypernatremia E87.0    Pneumothorax on left J93.9    Pneumothorax on right J93.9         Assessment:  (Medical Decision Making)     Patient Active Problem List   Diagnosis Code    Acute respiratory failure with hypoxia ([facility]) J96.01    COVID-19 U07.1    Acute hypoxemic respiratory failure due to COVID-19 (HCC) U07.1, J96.01    Acute respiratory distress syndrome (ARDS) due to COVID-19 virus (HCC) U07.1, J80    Delirium R41.0    Pneumomediastinum (HCC) J98.2    Subcutaneous emphysema (HCC) T79. 7XXA    Hypernatremia E87.0    Pneumothorax on left J93.9    Pneumothorax on right J93.9         Plan:  (Medical Decision Making)     Hospital Problems  Date Reviewed: 10/9/2020          Codes Class Noted POA    Hypernatremia ICD-10-CM: E87.0  ICD-9-CM: 276.0  12/6/2020 Unknown    Add Free water through NGT and engage nutrition for electrolyte replacement. Monitor daily    Delirium ICD-10-CM: R41.0  ICD-9-CM: 780.09  12/5/2020 Yes    Now sedated and intubated    Pneumomediastinum ([address] ICD-10-CM: J98.2  ICD-9-CM: 518.1  12/5/2020 No    Chest tubes placed    Subcutaneous emphysema ([address] ICD-10-CM: T79. 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    Acute respiratory failure with hypoxia (HCC) ICD-10-CM: J96.01  ICD-9-CM: 518.81  12/2/2020 Yes    Critically ill    Day 6 rocephine /zithromax- will stop after tomorrow's dosing    COVID-19 ICD-10-CM: U07.1  ICD-9-CM: 079.89  12/2/2020 Yes        Acute hypoxemic respiratory failure due to COVID-19 [facility]) ICD-10-CM: U07.1, J96.01  ICD-9-CM: 518.81, 079.89, 799.02  12/2/2020 Yes    ARDS with P:F  Of 63>>>102 this AM  Low Tidal Volume, high PEEP   Keep PaO2 at or above 60 mmHg  FiO2 goal 60% or less  PLp 32 or less- 25 today  Daily abg  Prone if needed  Flolan if proning ineffective      Acute respiratory distress syndrome (ARDS) due to COVID-19 virus [facility]) ICD-10-CM: U07.1, J80  ICD-9-CM: 518.82, 079.89  12/2/2020 Yes    S/P 5 d remdesivir- last dose 12/7/20   S/P Convalescent plasma-12/3/20  On Decadron day #5  Respiratory status stable see above.         Urine OP decreased , switch fluids to NS at 125 cc/hr  Add D%W if Na not coming down despite FWF+increase FWF  Start TF    Critical care time 30 minutes so far      Augustus Oropeza Spouse [phone]        More than 50% of the time documented was spent in face-to-face contact with the patient and in the care of the patient on the floor/unit where the patient is located.     Lucía Aviles MD

## 2020-12-08 NOTE — PROGRESS NOTES
12/08/20 8979   Patient Observations   Pulse (Heart Rate) 98   Resp Rate 24   O2 Sat (%) 99 %   Airway - Endotracheal Tube 12/07/20 Oral   Placement Date/Time: 12/07/20 1000   Number of Attempts: 1  Present on Admission/Arrival: No  Location: Oral  Placement Verified: Auscultation;BBS;Chest x-ray;EtCO2  Airway Tube Size: 8 mm   Insertion Depth (cm) 26 cm   Line Jorge L Lips   Side Secured Device   Cuff Pressure 34 cmH20   Respiratory   Respiratory (WDL) X   Patient on Vent Yes - If patient is on vent, add Doc Flowsheet Ventilator ().    Respiratory Pattern Regular   Chest/Tracheal Assessment Chest expansion, symmetrical   Breath Sounds Bilateral Diminished   Breath Sounds Left Diminished   Breath Sounds Right Diminished   Cough Non-productive   Vent Settings   FIO2 (%) 85 %   SpO2/FIO2 Ratio 116.47   CMV Rate Set 24   Back-Up Rate 10   PC Set 18   PEEP/VENT (cm H2O) 12 cm H20   Insp Time (sec) 1.1 sec   Insp Rise Time % 50 %   Flow Trigger 2   Exp Time (sec) 1.4 sec   Ventilator Measurements   Resp Rate Observed 24   Vt Exhaled (Machine Breath) (ml) 0.61 ml   Vt Spont (ml) 0 ml   Ve Observed (l/min) 14 l/min   PIP Observed (cm H2O) 31 cm H2O   Plateau Pressure (cm H2O) 29 cm H2O   MAP (cm H2O) 21   I:E Ratio Actual 1:1.30   Auto PEEP Observed (cm H2O) 1 cm H2O   Dynamic Compliance (ml/cm H20) 35 ml/cm H20   Static Compliance (ml/cm H20) 38 ml/cm H20   Safety & Alarms   Pressure Max 50 cm H2O   Pressure Min 2 cm H2O   Ve Min 2   Ve Max 25   RR Min 5   RR Max 55   Ambu Bag Yes   Ambu Mask Yes   Weaning Parameters   Spontaneous Breathing Trial Complete No (Comments)   Vent Method/Mode   Ventilation Method Conventional   Ventilator Mode Pressure control   Ventilator Mode ID A/C

## 2020-12-08 NOTE — PROGRESS NOTES
Ventilator check complete; patient has a #8. 0 ET tube secured at the 26 at the lip. Patient is sedated. Patient is not able to follow commands. Breath sounds are diminished. Trachea is midline, Positive for subcutaneous air, and chest excursion is symmetric. Patient is also Negative for cyanosis and is Positive for pitting edema. All alarms are set and audible. Resuscitation bag is at the head of the bed.       Ventilator Settings  Mode FIO2 Rate Tidal Volume Pressure PEEP I:E Ratio   Pressure control  90 %   24       12 cm H20  1:1.27      Peak airway pressure: 31 cm H2O   Minute ventilation: 15.8 l/min       Lam Coello, RT

## 2020-12-09 NOTE — PROGRESS NOTES
Problem: Falls - Risk of 
Goal: *Absence of Falls Description: Document Manda Velasquez Fall Risk and appropriate interventions in the flowsheet. Outcome: Progressing Towards Goal 
Note: Fall Risk Interventions: 
Mobility Interventions: Bed/chair exit alarm Mentation Interventions: Bed/chair exit alarm, Adequate sleep, hydration, pain control Medication Interventions: Bed/chair exit alarm Elimination Interventions: Toileting schedule/hourly rounds History of Falls Interventions: Bed/chair exit alarm Problem: Patient Education: Go to Patient Education Activity Goal: Patient/Family Education Outcome: Progressing Towards Goal 
  
Problem: Pressure Injury - Risk of 
Goal: *Prevention of pressure injury Description: Document Jaquan Scale and appropriate interventions in the flowsheet. Outcome: Progressing Towards Goal 
Note: Pressure Injury Interventions: 
Sensory Interventions: Assess changes in LOC, Float heels, Keep linens dry and wrinkle-free, Minimize linen layers, Turn and reposition approx. every two hours (pillows and wedges if needed) Moisture Interventions: Absorbent underpads, Internal/External urinary devices Activity Interventions: Assess need for specialty bed Mobility Interventions: Assess need for specialty bed, Suspension boots, Turn and reposition approx. every two hours(pillow and wedges) Nutrition Interventions: Document food/fluid/supplement intake Friction and Shear Interventions: Apply protective barrier, creams and emollients, HOB 30 degrees or less, Foam dressings/transparent film/skin sealants Problem: Patient Education: Go to Patient Education Activity Goal: Patient/Family Education Outcome: Progressing Towards Goal 
  
Problem: Non-Violent Restraints Goal: *Removal from restraints as soon as assessed to be safe Outcome: Progressing Towards Goal 
Goal: *No harm/injury to patient while restraints in use Outcome: Progressing Towards Goal 
 Goal: *Patient's dignity will be maintained Outcome: Progressing Towards Goal 
Goal: *Patient Specific Goal (EDIT GOAL, INSERT TEXT) Outcome: Progressing Towards Goal 
Goal: Non-violent Restaints:Standard Interventions Outcome: Progressing Towards Goal 
Goal: Non-violent Restraints:Patient Interventions Outcome: Progressing Towards Goal 
Goal: Patient/Family Education Outcome: Progressing Towards Goal 
  
Problem: Ventilator Management Goal: *Adequate oxygenation and ventilation Outcome: Progressing Towards Goal 
Goal: *Patient maintains clear airway/free of aspiration Outcome: Progressing Towards Goal 
Goal: *Absence of infection signs and symptoms Outcome: Progressing Towards Goal 
Goal: *Normal spontaneous ventilation Outcome: Progressing Towards Goal

## 2020-12-09 NOTE — PROGRESS NOTES
Critical Care Daily Progress Note: 12/9/2020    Mick John   Admission Date: 12/2/2020         The patient's chart is reviewed and the patient is discussed with the staff.      70 y.o.  male presents with acute hypoxemia respiratory  failure secondary to COVID-19. The patient arrived via EMS from home with reports on acute onset of dyspnea. He was recently in the ED on 11/27/2020 secondary to gait instability and subsequent fall. He is currently being worked up for nWay. Upon arrival to the Ed, the patient's SPO2 levels were in the 50's on RA. He was placed on oxygent therapy with escalation to BiPAP. Reports no known COVID exposure. The patient has minimal medical history because he does not routinely see a PCP and only had has first office appointment on 10/8/2020 with Dr. Raúl Reyes due to neurological changes. He is a previous smoker but quit in 2007. The patient's son is the primary historian. The family wasn't aware that the patient could have COVID. The son suggested that the patient was having some dyspnea x 1 week. Also suggested he has been bedridden for nearly a week due to neurological changes. The rapid COVID-19 test was completed in the ED and was positive.  Lactic acid level is 1.3, procal 0.28, WBC 7.3   Intubated 12/8/20  Bilateral 28F chest tubes 12/8/20     Subjective:     No major overnight events on 70% FiO2, P:F; 127 from 102      Current Facility-Administered Medications   Medication Dose Route Frequency    hydrALAZINE (APRESOLINE) 20 mg/mL injection 20 mg  20 mg IntraVENous Q4H PRN    famotidine (PF) (PEPCID) 20 mg in 0.9% sodium chloride 10 mL injection  20 mg IntraVENous Q24H    NUTRITIONAL SUPPORT ELECTROLYTE PRN ORDERS   Does Not Apply PRN    0.9% sodium chloride infusion  125 mL/hr IntraVENous CONTINUOUS    fentaNYL in normal saline (pf) 25 mcg/mL infusion  0-200 mcg/hr IntraVENous TITRATE    propofol (DIPRIVAN) 10 mg/mL infusion  0-50 mcg/kg/min IntraVENous TITRATE    acetaminophen (TYLENOL) tablet 650 mg  650 mg Per NG tube Q4H PRN    insulin lispro (HUMALOG) injection   SubCUTAneous Q6H    dexamethasone (DECADRON) 4 mg/mL injection 6 mg  6 mg IntraVENous DAILY    morphine 10 mg/ml injection 5 mg  5 mg IntraVENous Q4H PRN    influenza vaccine 2020-21 (6 mos+)(PF) (FLUARIX/FLULAVAL/FLUZONE QUAD) injection 0.5 mL  0.5 mL IntraMUSCular PRIOR TO DISCHARGE    enoxaparin (LOVENOX) injection 30 mg  30 mg SubCUTAneous Q12H    dexmedeTOMidine (PRECEDEX) 400 mcg in 0.9% sodium chloride 100 mL infusion  0.1-1.5 mcg/kg/hr IntraVENous TITRATE    sodium chloride (NS) flush 5-40 mL  5-40 mL IntraVENous Q8H    sodium chloride (NS) flush 5-40 mL  5-40 mL IntraVENous PRN    0.9% sodium chloride infusion 250 mL  250 mL IntraVENous PRN    insulin regular (NOVOLIN R, HUMULIN R) injection   SubCUTAneous ACB&D       Review of Systems   Unobtainable due to patient status. Objective:     Vitals:    12/09/20 0615 12/09/20 0630 12/09/20 0645 12/09/20 0700   BP: (!) 172/77 (!) 171/77 (!) 172/79 (!) 165/77   Pulse: (!) 126 (!) 126 (!) 125 (!) 124   Resp: 24 24 25 24   Temp:       SpO2: 100% 100% 100% 98%   Weight:       Height:             Intake/Output Summary (Last 24 hours) at 12/9/2020 0809  Last data filed at 12/9/2020 0700  Gross per 24 hour   Intake 4057.44 ml   Output 1357 ml   Net 2700.44 ml     Ventilator Settings  Mode FIO2 Rate Tidal Volume Pressure PEEP   Pressure control  70 %          12 cm H20      Peak airway pressure: 30 cm H2O   Minute ventilation: 13 l/min     Physical Exam:          Constitutional:  Intubated and sedated  EENMT:  Sclera clear, pupils equal, oral mucosa moist  Respiratory:  Crackles ,crepitance over chest wall  Cardiovascular:  RRR without M,G,R  Gastrointestinal: soft and non-tender; with positive bowel sounds. Musculoskeletal: warm without cyanosis. There is no lower extremity edema.   Skin:  no jaundice or rashes, no wounds Neurologic: no gross neuro deficits     Psychiatric:  sedated    LINES:  picc  Bilateral chest tubes 28F 12/07/20  Arterial line R radial 12/7/2020    DRIPS:  precedex    CXR:                 LAB  Recent Labs     12/08/20  2329 12/08/20  1717 12/08/20  1258 12/08/20  0741 12/08/20  0534   GLUCPOC 139* 95 151* 128* 128*      Recent Labs     12/09/20  0344 12/08/20  0846 12/06/20  1114   WBC 12.3* 14.0* 10.5   HGB 10.9* 12.0* 13.1*   HCT 29.9* 34.4* 39.8*   PLT 97* 109* 110*     Recent Labs     12/09/20  0344 12/08/20  0846 12/07/20  1429 12/06/20  1114   * 155* 159* 155*   K 5.1 4.7  --  4.3   * 126*  --  125*   CO2 23 24  --  25   * 154*  --  143*   BUN 88* 73*  --  37*   CREA 3.06* 2.49*  --  0.97   MG 3.7*  --   --   --    PHOS 6.4*  --   --   --    CA 8.2* 7.7*  --  8.0*     Recent Labs     12/09/20  0401 12/08/20  0306 12/07/20  1112   PHI 7.24* 7.34* 7.33*   PCO2I 54.8* 43.8 45.8*   PO2I 89 92 63*   HCO3I 23.6 23.4 24.0     No results for input(s): LCAD, LAC in the last 72 hours. Recent Labs     12/09/20  0401 12/08/20  0306 12/07/20  1112   PHI 7.24* 7.34* 7.33*   PCO2I 54.8* 43.8 45.8*   PO2I 89 92 63*   HCO3I 23.6 23.4 24.0       Patient Active Problem List   Diagnosis Code    Acute respiratory failure with hypoxia ([facility]) J96.01    COVID-19 U07.1    Acute hypoxemic respiratory failure due to COVID-19 (HCC) U07.1, J96.01    Acute respiratory distress syndrome (ARDS) due to COVID-19 virus (HCC) U07.1, J80    Delirium R41.0    Pneumomediastinum (HCC) J98.2    Subcutaneous emphysema (HCC) T79. 7XXA    Hypernatremia E87.0    Pneumothorax on left J93.9    Pneumothorax on right J93.9         Assessment:  (Medical Decision Making)     Patient Active Problem List   Diagnosis Code    Acute respiratory failure with hypoxia ([facility]) J96.01    COVID-19 U07.1    Acute hypoxemic respiratory failure due to COVID-19 (HCC) U07.1, J96.01    Acute respiratory distress syndrome (ARDS) due to COVID-19 virus ([facility] [address] U07.1, J80    Delirium R41.0    Pneumomediastinum ([facility] [address] J98.2    Subcutaneous emphysema ([facility] [address] T79. 7XXA    Hypernatremia E87.0    Pneumothorax on left J93.9    Pneumothorax on right J93.9         Plan:  (Medical Decision Making)     Hospital Problems  Date Reviewed: 10/9/2020          Codes Class Noted POA    Hypernatremia ICD-10-CM: E87.0  ICD-9-CM: 276.0  12/6/2020 Unknown    Add Free water through NGT and engage nutrition for electrolyte replacement. Monitor daily- 400 cc fwf q 6h    Delirium ICD-10-CM: R41.0  ICD-9-CM: 780.09  12/5/2020 Yes    Now sedated and intubated    Pneumomediastinum ([facility] [address] ICD-10-CM: J98.2  ICD-9-CM: 518.1  12/5/2020 No    Chest tubes placed    Subcutaneous emphysema (HCC) ICD-10-CM: T79. 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    Acute respiratory failure with hypoxia (HCC) ICD-10-CM: J96.01  ICD-9-CM: 518.81  12/2/2020 Yes    Critically ill    Day 7 rocephin /zithromax- will stop after tomorrow's dosing    COVID-19 ICD-10-CM: U07.1  ICD-9-CM: 079.89  12/2/2020 Yes        Acute hypoxemic respiratory failure due to COVID-19 [facility]) ICD-10-CM: U07.1, J96.01  ICD-9-CM: 518.81, 079.89, 799.02  12/2/2020 Yes    ARDS with P:F  Of 63>>>102>>>127 this AM  Low Tidal Volume, high PEEP   Keep PaO2 at or above 60 mmHg  FiO2 goal 60% or less  PLp 32 or less  Daily abg  Prone if needed  Flolan if proning ineffective      Acute respiratory distress syndrome (ARDS) due to COVID-19 virus [facility]) ICD-10-CM: U07.1, J80  ICD-9-CM: 518.82, 079.89  12/2/2020 Yes    S/P 5 d remdesivir- last dose 12/7/20   S/P Convalescent plasma-12/3/20  On Decadron day #6/10  Respiratory status stable see above.         Urine OP decreased , switch fluids to NS at 125 cc/hr  D5W + FWF  Start TF  Add reglan for high residuals 10mg q 6    Critical care time 30 minutes so far      Donnell Das [phone]        More than 50% of the time documented was spent in face-to-face contact with the patient and in the care of the patient on the floor/unit where the patient is located.     Roddie Lanes, MD

## 2020-12-09 NOTE — PROGRESS NOTES
Ventilator check complete; patient has a #8. 0 ET tube secured at the 26 at the lip. Patient is not sedated. Patient is not able to follow commands. Breath sounds are diminished. Trachea is midline, positive for subcutaneous air, and chest excursion is symmetric. Patient is also Negative for cyanosis and is Negative for pitting edema. All alarms are set and audible. Resuscitation bag is at the head of the bed. Ventilator Settings  Mode FIO2 Rate Tidal Volume Pressure PEEP I:E Ratio   Pressure control  70 %          12 cm H20  1:1.27      Peak airway pressure: 30 cm H2O   Minute ventilation: 13 l/min     ABG: No results for input(s): PH, PCO2, PO2, HCO3 in the last 72 hours.       Nir Hsieh

## 2020-12-09 NOTE — PROGRESS NOTES
Ventilator check complete; patient has a #8. 0 ET tube secured at the 26 at the lip. Patient is sedated. Patient is not able to follow commands. Breath sounds are coarse and diminished. Trachea is midline, Positive for subcutaneous air, and chest excursion is symmetric. Patient is also Negative for cyanosis and is Negative for pitting edema. All alarms are set and audible. Resuscitation bag is at the head of the bed.       Ventilator Settings  Mode FIO2 Rate Tidal Volume Pressure PEEP I:E Ratio   Pressure control  70 %   24     567    12 cm H20  1:1.27      Peak airway pressure: 31 cm H2O   Minute ventilation: 13.2 l/min       Christina Schmidt, RT

## 2020-12-09 NOTE — PROGRESS NOTES
Pt weaned to 70% on 12/8. SATs >92%. CXR shows \"Decreased small right apical pneumothorax with Unchanged bilateral lung infiltrates\". Problem: Ventilator Management  Goal: *Adequate oxygenation and ventilation  Outcome: Progressing Towards Goal     Awaiting morning ABG and Labs. Care team will continue to monitor.

## 2020-12-10 PROBLEM — D69.6 THROMBOCYTOPENIA (HCC): Status: ACTIVE | Noted: 2020-01-01

## 2020-12-10 PROBLEM — N17.9 AKI (ACUTE KIDNEY INJURY) (HCC): Status: ACTIVE | Noted: 2020-01-01

## 2020-12-10 NOTE — PROGRESS NOTES
Ventilator check complete; patient has a #8. 0 ET tube secured at the 26 at the lip. Patient is sedated. Patient is not able to follow commands. Breath sounds are diminished. Trachea is midline, Positive for subcutaneous air, and chest excursion is symmetric. Patient is also Negative for cyanosis and is Positive for pitting edema. All alarms are set and audible. Resuscitation bag is at the head of the bed. Ventilator Settings Mode FIO2 Rate Tidal Volume Pressure PEEP I:E Ratio Pressure control  70 %          11 cm H20  1:1.3 Peak airway pressure: 32 cm H2O Minute ventilation: 17.4 l/min ABG: No results for input(s): PH, PCO2, PO2, HCO3 in the last 72 hours. Jose Romo

## 2020-12-10 NOTE — PROGRESS NOTES
Chart reviewed as pt remains in CCU/Covid positive isolation. Discussed in am IDR. Remains intubated/vent. Nephrology cx today per MD. If able to get FIO2 down, will paralyze and prone. Wife now admitted with Covid to hospital. CM following for any assist and d/c POC when stable.

## 2020-12-10 NOTE — CONSULTS
[facility] 
CONSULTATION Name:  Gautam Quintero 
MR#:  [id number] :  1950 ACCOUNT #:  [de-identified] DATE OF SERVICE:  12/10/2020 NEPHROLOGY CONSULTATION 
 
REASON FOR CONSULTATION:  We are seeing the patient at the request of Dr. Jason Del Rosario with regards to acute kidney injury. HISTORY OF PRESENT ILLNESS:  The patient is a 66-year-old  male who had not sought much in the way of any health care up until around 2020 when he developed gait instability and was subsequently referred to Neurology for workup for possible Parkinson's disease. Subsequently to that on 2020, he was brought to the Emergency Department at [facility] with significant respiratory failure. He was found to be COVID-positive and has been admitted to the hospital, he has been treated with Decadron and remdesivir for his COVID. In terms of his respiratory failure, he has been treated with antibiotics and initially was on BiPap but eventually needed endotracheal intubation by 2020. We have been asked to see him now with worsening azotemia. The patient has no prior history of any renal insufficiency, kidney stones, proteinuria or hematuria. His renal function was normal on admission with a BUN of 28 and creatinine of 0.93. Creatinine stayed normal up through 2020. By 2020, his BUN was 73, creatinine 2.49. Today, he has a sodium 152, potassium 5.1, chloride 123, CO2 is 20, blood sugar 178, , creatinine 3.64. PAST MEDICAL HISTORY:  Significant only for; 1. Appendectomy in . 
2.  Remote history of a some form of head injury. 2.  Possible new-onset Parkinson's disease. CURRENT MEDICATIONS:  Include; 1. Decadron 6 mg IV daily. 2.  Pepcid 20 mg IV q.24h. 
3.  Humalog per sliding scale. 4.  Reglan 10 mg IV q.6h. 
5.  Argatroban. 6.  Precedex. 7.  Fentanyl. 8.  Diprivan. ALLERGIES:  HE HAS NO KNOWN DRUG ALLERGIES 
 
 SOCIAL HISTORY:  He is . Former smoker. Occasional alcohol use. FAMILY HISTORY:  Negative for any kidney disease. REVIEW OF SYSTEMS:  His review of systems cannot be obtained due to the patient's current condition. PHYSICAL EXAMINATION: 
GENERAL:  Reveals an elderly white male who is intubated, sedated on a ventilator. VITAL SIGNS:  He is afebrile. Blood pressure is 118/70, pulse is 90, respirations are 26. HEENT:  His head is normocephalic. Eyes:  Pupils are equal and react to light and accommodation. Extraocular muscles are intact. Fundi were not visualized. LUNGS:  He has bilateral breath sounds. HEART:  Regular rate and rhythm. ABDOMEN:  Soft. Bowel sounds are present. There is no hepatosplenomegaly. GENITAL/RECTAL:  Exam is deferred. EXTREMITIES:  He has no peripheral edema. IMPRESSION: 
1. Acute kidney injury. The elevated BUN to creatinine ratio may be suggestive of prerenal azotemia, but I am more in favor of this being new onset of ATN. He could also have COVID kidney. 2.  Hypernatremia. 3.  Mild hyperkalemia. 4.  Respiratory failure on ventilator. 5.  COVID-19 status post remdesivir and Decadron. 6.  History of Parkinson's disease. PLAN AND RECOMMENDATIONS:  We will get a renal ultrasound on him. We will also get a urinalysis and urine lytes and check serial labs. We would continue trying to give him free water to help with his hypernatremia. If his condition deteriorates, we may need to consider initiation of dialytic support. Thank you very much for allowing us to see him and helping in his care. MD RYLIE [email or web address] 
D:  12/10/2020 9:38 
T:  12/10/2020 10:19 
JOB #:  [id number] CC:  [facility] Nephrology      Nancy Gr MD

## 2020-12-10 NOTE — PROGRESS NOTES
Ventilator check complete; patient has a #8. 0 ET tube secured at the 26 at the lip. Patient is sedated. Patient is not able to follow commands. Breath sounds are diminished. Trachea is midline, Positive for subcutaneous air, and chest excursion is symmetric. Patient is also Negative for cyanosis and is Negative for pitting edema. All alarms are set and audible. Resuscitation bag is at the head of the bed. Ventilator Settings Mode FIO2 Rate Tidal Volume Pressure PEEP I:E Ratio Pressure control  70 %  25    19  11 cm H20  1:1.27 Peak airway pressure: 31 cm H2O Minute ventilation: 15.8 l/min Jordongretel Washburn, RT

## 2020-12-10 NOTE — PROGRESS NOTES
Remberto Gonzalez Admission Date: 12/2/2020 Daily Progress Note: 12/10/2020 The patient's chart is reviewed and the patient is discussed with the staff. 78 yo WM who presented with acute hypoxemia respiratory failure secondary to COVID-19. The patient arrived via EMS from home with reports of acute onset of dyspnea. He was recently in the ED on 11/27/2020 secondary to gait instability and subsequent fall. He is currently being worked up for [employer]. Upon arrival to the ED, the patient's SPO2 levels were in the 50's on RA. He was placed on oxygen therapy with escalation to BiPAP. Reports no known COVID exposure. The patient has minimal medical history because he does not routinely see a PCP and only had has first office appointment on 10/8/2020 with Dr. Teddy Mckeon due to neurological changes. He is a previous smoker but quit in 2007. The patient's son is the primary historian. The family wasn't aware that the patient could have COVID. The son suggested that the patient was having some dyspnea x 1 week. Also suggested he has been bedridden for nearly a week due to neurological changes. The rapid COVID-19 test was completed in the ED and was positive. Lactic acid level is 1.3, procal 0.28, WBC 7.3  
Intubated 12/8/20 Bilateral 28F chest tubes 12/8/20 S/p 5 days remdesivir, last dose 12/7. S/p convalescent plasma 12/3. Subjective:  
 
Patient remains intubated. Day 3 on vent. Currently on 70% Fio2 and satting well. PEEP 11. Sedated with propofol and fentanyl. B chest tubes still in place. L>R air leak present. Getting tube feeds with some residuals. Already on reglan. Duskiness noted on R middle finger and L toes. Current Facility-Administered Medications Medication Dose Route Frequency  docusate sodium (COLACE) capsule 100 mg  100 mg Oral DAILY  argatroban 50 mg in 0.9% sodium chloride 50 mL (1000 mcg/mL) infusion  0.5-10 mcg/kg/min IntraVENous TITRATE  hydrALAZINE (APRESOLINE) 20 mg/mL injection 20 mg  20 mg IntraVENous Q4H PRN  
 famotidine (PF) (PEPCID) 20 mg in 0.9% sodium chloride 10 mL injection  20 mg IntraVENous Q24H  
 metoclopramide HCl (REGLAN) injection 10 mg  10 mg IntraVENous Q6H  
 NUTRITIONAL SUPPORT ELECTROLYTE PRN ORDERS   Does Not Apply PRN  
 fentaNYL in normal saline (pf) 25 mcg/mL infusion  0-200 mcg/hr IntraVENous TITRATE  propofol (DIPRIVAN) 10 mg/mL infusion  0-50 mcg/kg/min IntraVENous TITRATE  acetaminophen (TYLENOL) tablet 650 mg  650 mg Per NG tube Q4H PRN  
 insulin lispro (HUMALOG) injection   SubCUTAneous Q6H  
 dexamethasone (DECADRON) 4 mg/mL injection 6 mg  6 mg IntraVENous DAILY  morphine 10 mg/ml injection 5 mg  5 mg IntraVENous Q4H PRN  
 influenza vaccine 2020-21 (6 mos+)(PF) (FLUARIX/FLULAVAL/FLUZONE QUAD) injection 0.5 mL  0.5 mL IntraMUSCular PRIOR TO DISCHARGE  dexmedeTOMidine (PRECEDEX) 400 mcg in 0.9% sodium chloride 100 mL infusion  0.1-1.5 mcg/kg/hr IntraVENous TITRATE  sodium chloride (NS) flush 5-40 mL  5-40 mL IntraVENous Q8H  
 sodium chloride (NS) flush 5-40 mL  5-40 mL IntraVENous PRN  
 0.9% sodium chloride infusion 250 mL  250 mL IntraVENous PRN Review of Systems Unobtainable due to patient status. Objective:  
 
Vitals:  
 12/10/20 0745 12/10/20 0801 12/10/20 0815 12/10/20 0830 BP: 122/75 123/76 123/76 118/70 Pulse: 95 94 91 90 Resp: 26 26 26 26 Temp:    98.3 °F (36.8 °C) SpO2: 95% 95% 95% 95% Weight:      
Height:      
 
Intake and Output:  
12/08 1901 - 12/10 0700 In: 7621.4 [I.V.:5336.4] Out: 2297 [[facility]:[id number]] 12/10 0701 - 12/10 1900 In: -  
Out: 95 [Urine:95] Physical Exam:  
Constitution:  the patient is ill appearing. EENMT:  Sclera clear, pupils equal, oral mucosa moist 
Respiratory: Audible sub q air diffusely. B chest tubes. Vigorous L air leak, minimal R air leak.   
Cardiovascular:  RRR without M,G,R 
 Gastrointestinal: soft and non-tender; with positive bowel sounds. Musculoskeletal: warm without cyanosis. There is no lower leg edema. Skin:  no jaundice or rashes, no wounds. R middle finger and toes on left foot with duskiness. Neurologic: sedated Psychiatric:  sedated CHEST XRAY:  
CXR Results  (Last 48 hours) 12/10/20 0548  XR CHEST PORT Final result Impression:  IMPRESSION: No interval change. Narrative:  EXAM: Chest x-ray. INDICATION: Dyspnea. Covid 19. COMPARISON: Yesterday's chest x-ray. TECHNIQUE: Frontal view chest x-ray. FINDINGS: Bilateral lung infiltrates, pneumomediastinum, a small right apical  
pneumothorax and extensive bilateral chest wall subcutaneous emphysema are  
unchanged. The cardiac size remains within normal limits. The endotracheal tube,  
enteric tube and bilateral chest tubes remain in place. 12/09/20 0207  XR CHEST PORT Final result Impression:  IMPRESSION:   
1. Decreased small right apical pneumothorax. Pneumomediastinum and extensive  
bilateral chest wall subcutaneous emphysema are unchanged. 2. Unchanged bilateral lung infiltrates. Narrative:  EXAM: Chest x-ray. INDICATION: Dyspnea. Covid 19. COMPARISON: Yesterday's chest x-ray. TECHNIQUE: Frontal view chest x-ray. FINDINGS: There is a decreased small right apical pneumothorax. No left-sided  
pneumothorax is seen. There is persistent pneumomediastinum and extensive  
bilateral chest wall subcutaneous emphysema. Patchy bilateral lung infiltrates  
are unchanged. Cardiac size is stable. The endotracheal tube, enteric tube, left  
arm PICC line and bilateral chest tubes remain in place. 12/10 LAB No lab exists for component: [place] Recent Labs 12/10/20 
0350 12/09/20 
0344 12/08/20 
4249 WBC 17.8* 12.3* 14.0* HGB 11.0* 10.9* 12.0*  
HCT 33.4* 29.9* 34.4*  
* 97* 109* Recent Labs 12/10/20 
0350 12/09/20 
0344 12/08/20 
4665 * 155* 155*  
K 5.1 5.1 4.7 * 125* 126* CO2 20* 23 24 * 133* 154* * 88* 73* CREA 3.64* 3.06* 2.49* MG  --  3.7*  --   
CA 8.3 8.2* 7.7* PHOS  --  6.4*  --   
 
ABG:   
Lab Results Component Value Date/Time PHI 7.24 (LL) 12/10/2020 04:01 AM  
 PCO2I 46.9 (H) 12/10/2020 04:01 AM  
 PO2I 93 12/10/2020 04:01 AM  
 HCO3I 20.0 (L) 12/10/2020 04:01 AM  
 FIO2I 70 12/10/2020 04:01 AM  
 
 
 
MICRO 
 
SARS-CoV-2 LAB Results [facility] Test: No results found for: COV2NT  
[facility] Test: No results found for: [facility] Test: No components found for: [id number] Blood - negative Assessment:  (Medical Decision Making) Hospital Problems  Date Reviewed: 10/9/2020 Codes Class Noted POA CAREN (acute kidney [facility]) [facility] ICD-10-CM: N17.9 ICD-9-CM: 584.9  12/10/2020 Unknown Thrombocytopenia ([facility] ICD-10-CM: D69.6 ICD-9-CM: 287.5  12/10/2020 Unknown Pneumothorax on left ICD-10-CM: J93.9 ICD-9-CM: 512.89  12/7/2020 Unknown Pneumothorax on right ICD-10-CM: J93.9 ICD-9-CM: 512.89  12/7/2020 Unknown Hypernatremia ICD-10-CM: E87.0 ICD-9-CM: 276.0  12/6/2020 Unknown Delirium ICD-10-CM: R41.0 ICD-9-CM: 780.09  12/5/2020 Yes Pneumomediastinum ([facility] ICD-10-CM: J98.2 ICD-9-CM: 518.1  12/5/2020 No  
   
 Subcutaneous emphysema ([facility]) ICD-10-CM: T79. 7XXA ICD-9-CM: 958.7  12/5/2020 No  
   
 Acute respiratory failure with hypoxia [facility]) ICD-10-CM: J96.01 
ICD-9-CM: 518.81  12/2/2020 Yes COVID-19 ICD-10-CM: U07.1 ICD-9-CM: 079.89  12/2/2020 Yes * (Principal) Acute hypoxemic respiratory failure due to COVID-19 [facility]) ICD-10-CM: U07.1, J96.01 
ICD-9-CM: 518.81, 079.89, 799.02  12/2/2020 Yes Acute respiratory distress syndrome (ARDS) due to COVID-19 virus [facility]) ICD-10-CM: U07.1, J80 
ICD-9-CM: 518.82, 079.89  12/2/2020 Yes Patient with severe acute hypoxic respiratory failure, intubated, with resultant B ptx s/p B chest tubes. Severe sub q air despite this intervention. Hypernatremic, responding to free water. Now with worsening renal failure. Plts have dropped to around 50% of starting point and now having duskiness of digits. Plan:  (Medical Decision Making)  
-consult nephrology for worsening renal failure.  
-daily cxr's to monitor PTX requiring B chest tubes. -wean FiO2. If not able to get to 60% or less Fio2 will need to consider paralytics and proning.  
-continue free water, Na coming down.  
-completed courses of remdesivir and convalescent plasma already.  
-continue dexamethasone with last dose tomorrow.  
-completed course of rocephin/azithro 
-pepcid 
-on heparin 5000 q 8. plts have dropped. Will check hit panel, stop heparin and change to argatroban. Spouse Mireille Casas [phone] - attempted to call her to update her. Reached her son and was informed she is now hospitalized here with suspected covid as well. Updated son Marilyn Pereira at this number instead. Critically ill patient with complicated medical issues and high risk of further decompensation. Total critical care time spent thus far (exclusive of procedures): 42 minutes Cornelia Boston MD

## 2020-12-10 NOTE — CONSULTS
[facility] Subjective: CAREN   Renal consult dictated # [id number] Review of Systems - Can not be obtained. Objective: 
 
Vitals:  
 12/10/20 0745 12/10/20 0801 12/10/20 0815 12/10/20 0830 BP: 122/75 123/76 123/76 118/70 Pulse: 95 94 91 90 Resp: 26 26 26 26 Temp:    98.3 °F (36.8 °C) SpO2: 95% 95% 95% 95% Weight:      
Height:      
 
 
PE 
Gen: intubated CV:reg rate Chest:bilat breath sounds. Abd: soft Ext/Access: no edema Thomas[facility] Dilling LAB Recent Labs 12/10/20 
0350 12/09/20 
0344 12/08/20 
3844 WBC 17.8* 12.3* 14.0* HGB 11.0* 10.9* 12.0*  
HCT 33.4* 29.9* 34.4*  
* 97* 109* Recent Labs 12/10/20 
0350 12/09/20 
0344 12/08/20 
9398 * 155* 155*  
K 5.1 5.1 4.7 * 125* 126* CO2 20* 23 24 * 133* 154* * 88* 73* CREA 3.64* 3.06* 2.49* MG  --  3.7*  --   
PHOS  --  6.4*  --   
CA 8.3 8.2* 7.7* Radiology A/P:  
Patient Active Problem List  
Diagnosis Code  Acute respiratory failure with hypoxia ([facility]) J96.01  
 COVID-19 U07.1  Acute hypoxemic respiratory failure due to COVID-19 ([facility]) U07.1, J96.01  
 Acute respiratory distress syndrome (ARDS) due to COVID-19 virus ([facility]) U07.1, J80  Delirium R41.0  Pneumomediastinum ([address] J98.2  Subcutaneous emphysema ([facility]) T79. 7XXA  Hypernatremia E87.0  Pneumothorax on left J93.9  Pneumothorax on right J93.9 CAREN - suspect this is ATN vs COVID kidney   Doubt pre-renal    Will check UA , urine lytes and renal US. May need dialytic support in next 24 -72 hrs. Resp Failure - on vent COVID 19 - s/p remdiesivir and decadrom MAXIMO Huynh MD

## 2020-12-10 NOTE — PROGRESS NOTES
Spoke with patient's son, Tabatha Alberto, at length regarding patient's condition and current treatment plan. All questions answered to satisfaction. Tabatha Alberto stated that Mrs. Agata Hurd (patient's spouse) is patient's HCPOA and unfortunately she has been hospitalized due to COVID symptoms as well. I spoke with Mrs. Agata Hurd, currently in room 811, and it is her desire for her son Tabatha Alberto to be the primary spokesperson for Mr. Agata Hurd and his medical decision maker until she is well and able to resume her duty as [address] [place] [address].

## 2020-12-10 NOTE — ROUTINE PROCESS
Guideline Guideline Number: [id number] Title: Management of the Patient with Mechanical Ventilation (including weaning) and ABCDE Bundle Effective Date:  03/00 Revised Date: 02/09, 03/10, 7/12, 5/13,                                  10/13, 8/14 Reviewed Date: 07/2015, 04/2016, 06/2017 I. Policy:  Management of the patient requiring mechanical ventilation, including readiness to wean and weaning protocol. The information provided serves as a guideline for patient management. Included in this guidelines is the ABCDE Bundle to provide guidance to staff for evidence based management of pain, agitation/anxiety and delirium in the intensive care unit. The goals of critical care analgesia and sedation are to facilitate mechanical ventilation, to prevent patient and caregiver injury, and to avoid the psychological and physiologic consequences of inadequate treatment of pain, anxiety, agitation, and delirium by maintaining a light level of sedation. Pain occurs commonly in adult ICU patients, regardless of admitting diagnosis. Therefore, pain should be frequently assessed and analgesic medications titrated to prevent adverse effects associated with either inadequate or excessive analgesia. Once pain has been addressed, anxiolytic and/or antipsychotic medications can be utilized to treat unresolved agitation/anxiety and delirium, with the goal to prevent over- or under sedation by using the Dhillon Agitation Sedation Scale (RASS). Assertive management of these issues has been shown to reduce costs, improve ICU outcomes such as successful extubation and ICU length of stay, and allow for patients to participate in their own care. II. Purpose: The respiratory care practitioner and the critical care RN will utilize the following guideline to provide the most efficient and effective management of mechanical ventilators and weaning and extubation processes. Goals of Treatment: 1. Adequate management of patients pain and discomfort while maintaining a light level of                   sedation (RASS score of 0 to -1) 2. Both chronic and acute sources of pain should be identified and treated. 3. Sedative agents should be considered if patient still expresses discomfort and/or is not at RASS         goal of 0 to -1 despite adequate management of pain. 4. Patients requiring neuromuscular blockage must have continuous infusions of analgesic and              sedative agents. III. Responsibility: Director Respiratory Care Services and all Respiratory Care Practitioners  with documented competency as well as Critical Care RN staff. General Guidelines 1. Introduction to Ventilator Plan Phase 
a. Ventilator Management Phase, General Statement -  The plan should be initiated in patients who have a secure airway/require invasive mechanical ventilation (endotracheal or tracheostomy) only -   The provider determines the appropriate medications used for analgesia and agitation/anxiety along with the clinical pharmacist 
 
2. Monitoring Levels of Comfort 
a. Pain Assessment 
- Pain is monitored using the numerical scales - A pain assessment should be conducted, at a minimum, every 4 hours and as needed and per guidelines. - The level of pain should be determined as satisfactory by the patient based on patients baseline level of pain , considering any chronic pain that the patient may have. - If the patient is unable to communicate pain level, the nurse can assess for nonverbal indicators including facial grimacing, moaning, tachypnea, tachycardia, hypertension, diaphoresis, etc as a cue to begin further pain assessment. b.  Sedation Assessment - Sedation is monitored using the Dhillon Agitation Sedation Scale (RASS) Target RASS RASS Description +4 Combative, violent, danger to staff 
  
+3 Pulls or removes tubes(s) or catheters; aggressive +2 Frequent nonpurposeful movement, fights ventilator +1 Anxious, apprehensive, but not aggressive  
0 Alert and calm  
-1 Awakens to voice (eye opening/contact) > 10 sec  
-2 Light sedation, briefly awakens to voice (eye opening/contact) < 10 sec  
-3 Moderate sedation, movement or eye opening. No eye contact  
-4 Deep sedation, no response to voice, but movement or eye opening to physical stimulation  
-5 Unarousable, no response to voice or physical stimulation  
 
-  Goal RASS is 0 to -1, unless otherwise specified by providers order. 
- Nursing staff should conduct the RASS every 4 hours and as needed to maintain goal RASS of 0 to -1. 
- If RASS is outside of goal range, discuss treatment options with provider. 
- A RASS score of +2 to +4 requires further assessment by the nurse. Causes of agitation/anxiety that should be considered include: 
a. Pulmonary -   endotracheal tube malposition or patency, mode of ventilation, pneumothorax, hypoxemia, hypercarbia 
b. Metabolic  hypoglycemia, hyponatremia, acute renal or hepatic failure 
c. Emotional upset  with information and awareness of critical condition, prognosis, need for surgical or invasive procedures, other interventions or complications, family or personal stressors 
- C. Sedation Assessment while using Neuromusclar Blocking Agents 
- D. Delirium Assessment 
a. the ICU ([facility]  ICU) 3. Analgesia The incidence of significant pain has been reported to be 50% or higher in both medical and surgical ICU patients. These patients also experience discomfort during routine/procedural ICU care and at rest.  However, patients may be unable to self-report their pain (either verbally or with other signs) because of an altered level of consciousness, the use of mechanical ventilation, or high doses of sedative agents or neuromuscular blocking agents.  
The short and long term consequences of unrelieved or inadequately treated pain are significant and include patient discomfort, decreased satisfaction with care by family and patient, delirium, agitation/anxiety, post traumatic stress disorder and depression. Therefore, routine assessment and treatment of pain should occur in all ICU patients. Causes and Treatment of Pain in the ICU 
a. Acute pain (post-operative, procedural pain, discomfort with usual ICU care or other acute episodes of pain-related to underlying disease) 1. Consider use of PCA for alert and oriented patients with pain needs not met by PRN dosing or opoids. 2. Preemptive analgesia should occur prior to chest tube removal, and should be considered for other procedural pain such as turning and repositioning, would drain removal, wound dressing change, tracheal suctioning, femoral catheter removal or place of central venous catheter. 3. Appropriate analgesic medications for preemptive analgesia are short acting intravenous (IV) agents (i.e. fentanyl, morphine, hydromorphone) a. Administration of analgesia before patient experiences noxious stimuli prevents amplification and hyperexcitability of the central nervous system. b. Analgesia for Mechanically Ventilated Patients: 
1. The approach to sedation and analgesia management for mechanically ventilated patients favors use of analgesia first sedation. The primary goal of this strategy is to address pain and discomfort first, and then if necessary, add anxiolytic agent. 2. Analgesia first sedation reduces dose escalation of medications, decreases the duration of mechanical ventilation and the incidence of VAP, improves the probability of successful extubation, and ultimately shortens ICU length of stay. 3. For pain management, analgesic medications are determined by the provider.    Intermittent dosing of the analgesic should be attempted first.   
If the patient requires more than 3 doses within 1 hour then provider should be contacted to consider continuous infusion. 4.  Analgesic options for mechanically ventilated patients include: 
a. Fentanyl which is considered the drug of choice for patients requiring continuous infusion. b.Morphine may be considered for those patients without renal dysfunction who are hemodynamically stable and require intermittent pain medication. Continuous infusions of morphine may be used for patientl who are receiving comfort care as part of end of life care. c.Hydromorphone is reserved for patients who are refractory to fentanyl or morphine and is typically admininstered by intermittent dosing. 4.  Agitation/Anxiety Background Agitation and anxiety frequently occur in ICU patients. Anxiolytic/sedation agents may be indicated to help relieve discomfort, improve synchrony with mechanical ventilation, and decrease the overall work of breathing. Pain control alone may be sufficient to make patients comfortable enough to require no anxiolytic/sedative agent. In addition, non-pharmacologic interventions such as repositioning or verbal assurance may be helpful to comfort or redirect an agitated patient. If these methods are unsuccessful, then anxiolytic/sedative medications such as propofol, dexmedetomidine, or benzodiazepines can be used. Selection of an anxiolytic should be based on the pharmacokinetic properties of the medication, patient specific characteristics, and sedation goal.   However, nonbenzodiazepine sedatives (ie propofol or dexmedetomidine) may be preferable over benzodiazepines (ie midazolam or larazepam) due to more favorable outcomes such as delirum. Causes and Treatment of Agitation/Anxiety 
a. Possible underlying causes of agitation and anxiety include pain, delirium, hypoxemia, hypoglycemia, hypotension, or withdrawal from alcohol  and other drugs.  
b. Analgesia first sedation should be attempted initially to manage pain and provide sedation in appropriate patients. Analgesia alone may be adequate to reach RASS goal of 0 to -1. If patient remains agitated or anxious despite adequate analgesia (ie RASS +2 to +4) then anxiolytic/sedative should be considered. c. The choice of anxiolytic should be based on desired levels of sedation (ie light sedation or deep sedation) with preference for the use of nonbenzodiazepines such as propofol or dexmedetomidine if appropriate. While light sedation (ie RASS 0 to -1) is preferred for most patients, there are instances when deep sedation (ie RASS -4 to -5) is desired. For example, in the setting of ventilator dysynchrony due to ARDS or for patients receiving NMB agents. d. Medications to maintain light sedation (ie RASS 0 to -1) include 1. Propofol continuous infusion can be considered for hemodynamically stable (ie SBP = 100, MAP = 65 and/or not requiring vasopressor support) patients requiring light sedation. Propofol has a quick onset (1-2 minutes) and offset of action, making it a good agent to assess neurological status and facilitate liberation from the mechanical ventilator. 2.Dexmedetomidine continuous infusion is a good option for hemodynamically stable patients requiring light sedation as it allows for a more awake, interactive patient is associated with less delirium. It has an intermediate onset of action (5-10 min). Therefore, abrupt titrations should be avoided, but use of prn haloperidol or benzodiazepine may be useful to manage agitation until the medication takes effect. 3. Antipsychotics are another option. In particular, haloperidol intermittently dosed may be useful for patients with symptoms of agitation/anxiety and delirium. 4.Benzodiazapines can also be considered for light sedation, but should be intermittently doses. Midazolam is an option for patients without renal dysfunction.   It has a short onset of action (2-5 minutes) making it a good agent for acute agitation/anxiety, but short duration of action resulting in frequent dosing. Lorazepam is another option. It has a longer onset of action (15-20 minutes) in comparison to midazolam, but longer duration of action. e. Medications to maintain deep sedation (RASS -4 to -5) include: 
1) Propofol continuous infusion should be considered as a first line option for hemodynamically stable patients. 2)Benzodiazepines can be considered as second line options for deep sedation. Studies comparing these agents to other sedatives have shown that they lead to worse outcomes including delirium, oversedation, delayed extubation, and longer time to discharge. Midazolam is one option for patients without renal dysfunction and lorazepam is another options. If patient requires more than 3 doses within 1 hour then contact provider  to consider initiation of continuous infusion. 5.  Daily Sedation Awakening Trial (SAT) from IV Continuous Analgesia/Sedation 
a. Patients are to have daily awakening from sedation while on continuous IV analgesia and/or sedation in the ICU. Continuous analgesia infusions may be maintained only if needed for active pain and RASS is at goal 0 - -1. Unit guideline is for the SAT to occur following ICP rounds each morning.   
b. The sedation awakening trial (SAT) is done regardless if the patient meets criteria for spontaneous breathing trial (SBT). c. SAT safety screen is assessed and SAT should not be performed if sedation is being used for active seizures, alcohol withdrawal, hemodynamically unstable or requiring support of vasoactive medications , in conjunction with NMB agents, if ICP is greater than 
20mmHg or if sedation is being used to control ICP, patients RASS is +3 or +4 (very agitated or combative).   Other exclusion criteria are:  if there is documentation of myocardial ischemia in the past 24 hours; or patient is receiving high frequency oscillator ventilation (HFOV) , if the patient has an open chest /abdomen or is receiving comfort care. d. Criteria for passing the SAT are the patient opened their eyes to verbal stimuli or tolerated sedative interruption without exhibiting failure criteria. 
e. Patients fail the SAT if the develop sustained anxiety, agitation, or pain; a respiratory rate of 35 per minutes for 5 minutes or longer, an SpO2 less than 88% for 5 minutes or longer; an acute cardiac dysrhythmia; two or more signs of respiratory distress including tachycardia, bradycardia; use of accessory muscles; diaphoresis; marked dyspnea; or myocardial ischemia. f. Respiratory therapy staff must verify with the nurse that continuous IV analgesia (unless being use  for active pain) and sedation (unless patient is receiving dexmedetomidine) is off prior to placing patient on SBT. g. DO NOT interrupt infusion of analgesia and sedation medications if patient is receiving neuromuscular blockade. 
h. Monitor level of wakefulness unless patient is awake and follows commands (RASS 0 to -1) or patient becomes uncomfortable or agitated (RASS +3 to +4) 
i. If agitation prevents successful awakening , administer bolus of analgesia and/or sedation then resume infusion of the medication at ½ previous dose and titrate as needed. 
j. If oversedation prevents successful awakening, hole infusion until at goal and resume ½ of prior infusion rate/dose if clinically indicated. 6. Delirium Background Delirium is characterized by the acute onset of cerebral dysfunction with a change or fluctuation baseline 
mental status, inattention, and either disorganized thinking or an altered level of consciousness. It affects up to 80% of mechanically ventilated adult ICU patients, and is associated with increased mortality,  
and treatment is important and may in turn allow for a patient to be conscious yet cooperative enough to participate in ventilator weaning trials and early mobilization efforts. Delirium can only be assessed in patients who are able to sufficiently interact and communicate with bedside 
clinicians (ie RASS -3 to +4). IV. Procedure: A. Assessment: The following criteria must be assessed prior to the initiation of weaning from mechanical ventilation. Note: The criteria are general guidelines and must be individualized for each patient. The patients primary nurse will be responsible, in coordination with the RT, the Spontaneous Awakening Trial). The RT will perform the SBT. B. Spontaneous Awakening Trials (SATs  also referred to as Sedation Vacation) and Spontaneous Breathing Trials (SBTs) performed to determine readiness to wean. 1. For patients who meet established criteria, such as those without active seizures, alcohol withdrawal and agitation, myocardial ischemia or those requiring cardiac support devices, without increased intracranial pressure and those not receiving neuromuscular blockade, the nurse will reduce the infusions of sedative by 50% of current used for sedation that was used to achieve a level of light sedation (Reynoso Score 2 or RASS score of 0 to -1) and evaluate patient response to reduction of sedation. Analgesics required for pain control are continued during the test.  Obtain MD order to cover no SAT for that time period if patient has any exclusion criteria as described above. 2. Failure of the spontaneous awakening trial occurs when the patient shows symptoms such as increased agitation, anxiety, pain or signs of respiratory distress including respiratory rate >35/min or oxygen saturation <88% as well as development of acute cardiac arrhythmias. If these symptoms develop during the SAT, the nurse then restarts sedation at 75% of the previous dose and titrates the medications until the patient is comfortable and/or symptoms have abated. 3.  If the patient passes the SAT then the patient moves on to the Spontaneous Breathing Trials as performed by the RT. The SBT Safety Screen included the following:  No agitation, oxygen saturation > 88%, FIO2 < 50%, PEEP < 7.5 cm H20, no myocardial ischemia, no vasopressor use, and with inspiratory efforts. 4. Patients who pass the spontaneous awakening trial but fail the spontaneous breathing trial are placed back on full ventilator support and reassessed the next day. 5. Failure of the SBT (spontaneous breathing trail) includes any of the following:  Respiratory rate > 35/min, respiratory rate < 8/min, oxygen saturation < 88%, respiratory distress, mental status change, acute cardiac arrhythmia. 6. Extubation is considered for patients who tolerate the spontaneous awakening trial and pass the spontaneous breathing trial.   
C. Can the cause of respiratory failure be reversed (i.e. absence of high spinal cord injury or advanced ALS)? D. Is gas exchange adequate? 1. PaO2/FIO2 ratio > 150  200, 2. PEEP < 8 cm H20 
3. FIO2 < 50 
4. pH > 7.30 
5. Rapid shallow breathing index (f/VT) < 105 
E. Is patient hemodynamically stable? 1. Absence of clinically significant hypotension (minimal vasopressors such as Dopamine < 5mcg/kg/minute)? F. Is there evidence of intact respiratory drive (NIP/NIF >-51 DBE37, stable VC02)? G. Does patient have an adequate cough, airway clearance ability? H. Is there absence of excessive secretions? V. Initiation: A. The therapist shall consult with RN to determine if sedation can be discontinued or significantly decreased. If this can be achieved, the therapist shall implement the  
                  followin. Identify patient and verify name and account number via ID bracelet. 2. Perform hand hygiene per hospital policy utilizing Standard Precautions for all patients and following transmission-based isolation as indicated per hospital policy. 3. Perform a ONE-MINUTE SPONTANEOUS TRIAL AND ASSESSMENT. 4. Measure Rapid Shallow Breathing Index (RSBI) and monitor SpO2 and cardiovascular parameters during the spontaneous breathing assessment. 5. If SpO2 and cardiovascular parameters are stable, continue spontaneous breathing trial for at least 30 minutes and up to 120 minutes, as patient tolerates. 6. Monitor ventilatory status, SpO2, and cardiovascular status during spontaneous breathing trial. 
7. If patient has a successful trial, consider patient as a candidate for extubation and obtain order. 8. If patient fails the weaning trial, place back on ventilator and adjust settings to provide a non-fatiguing form of ventilatory support for the remainder of the day and night. 9. One attempt at weaning shall be performed each day until successful weaning occurs. The RCP will make every attempt to begin the spontaneous breathing trials between 0500 and 0600 to provide documentation of the trial when the pulmonologist makes rounds. B.  Assessment of SBT or PST: 
1. Is gas exchange acceptable? 2. PaO2 > 60 mm Hg. 3. PH > 7.30 
4. Increase in PaCO2  < 10 mm Hg C. Is patient hemodynamically stable? 1. HR < 120 beats/minute 2. HR  < 20% 3. Systolic BP < 951 and > 90 mmHg 4. BP  < 20%, no vasopressors required D. Does patient have stable ventilatory pattern? 1. Sustained RR < 30 breaths per minute 2. Normal and stable VCO2 3. Patient is not demonstrating any signs of increased work of breathing, such as increased use of accessory muscles. E. Mental status stable throughout trial? 
1. Absence of changes such as somnolence, excessive agitation or anxiety 2. Absence of diaphoresis during trial? 
IV. Safety: A. The RCP shall monitor patient according to the above guidelines.  If at any time during the weaning process, the respiratory therapist or nurse feels that the patient is not tolerating weaning, the therapist shall place patient back on previous ventilator settings. B. The patient shall be reassessed and the weaning process should be continued the following day. V. Reportable Conditions: A. The therapist shall notify the physician, as appropriate, for any of the following         conditions: 1. FIO2 increase (sustained) at 10% or greater 2. Poor patient/ventilator interface in spite of adjustments 3. Need for increased sedation for respiratory distress 4. Need for increasing ventilating pressures (i.e. PEEP, PIP, MAP) 5. ABG results meeting panic value criteria or other clinical signs indicating deterioration of patients condition. 6. Unplanned extubation. 7. Unexplained sustained increase in PIP greater than 10 cm H2O. 
8. Assessment results regarding ventilator discontinuance process. VI. Ventilator protocol management A. The following items should be maintained for patients who are being mechanically           ventilated. 1. Obtain STAT Chest X-Ray for ET tube placement after insertion. 2. Chest X-Ray q a.m. while on ventilator. 3. ABGs 30 - 60 minutes after being stable on the ventilator. 4. ABG's q a.m. while on ventilator and prn. 
5. Do spontaneous breathing trials when patient is hemodynamically stable, responsive, and without fever. 6. Terminate trials if patient exhibits signs of respiratory distress. 7. Therapists should maintain ABG s as follows: 
    a. pH -  7.30 - 7.50              
    b. PaO2 -   60  100  
     8. Racemic Epinephrine (0.5cc) for post extubation stridor (2 UA treatments max.) 
 
VII. Early Mobilization Mobility Level Criteria Start at Level 1 if:  
PaO2/FIO2 <250 Positive end-expiratory Pressure (PEEP) >=10 cm H2O  
O2 saturation <90% Respiratory Rate (RR) Not within 10-30 per min Cardiac arrhythmias or ischemia New onset Heart Rate  (HR) <60 or >120 beats per min Mean arterial pressure (MAP) <55 or >140 mmHg Systolic blood pressure (SBP) <90 or > 180 mmHg Vasopressor infusion New or increasing Dhillon Agitation Scale (RASS) < - 3 Level I:   Breathe  (Rass -5 to -3) HOB Angle  improve VAP protocol compliance ? Visually confirm the [address],[place] [address] is elevated >= 30 degrees to comply with VAP prevention protocols ? The Centers for Disease Control and Prevention recommends an HOB angle of 30-45 degrees , unless contraindicated Additional activities to be implemented ? Every 2 hour turning ? Passive range of motion ? Up to 20 degrees reverse trendelenburg with lower extremity exercise/retracting footboard ? Continuous lateral rotation therapy can be considered part of early mobility therapy in patients who are at high risk for pulmonary complications Move to Level 2 when the patient 
- Has acceptable oxygenation/hemodynamics - Tolerates q 2 turning - Tolerates HOB > 30 degrees or up to 20 degrees reverse trendelenburg Level 2 :Tilt  Patient Assessment Rass > -3  (eg, opens eyes, may have profound weakness) Up to 20 degrees Reverse Trendelenburg position and 10 degrees minimum HOB 
- Reverse Trendelenburg positioning allows for orthostatic position in fragile patients - If available , use in conjunction with retracting foot section to allow for partial weight bearing prior to sitting up in the bed or getting out of bed Additional activities to be implemented -  Maintain HOB >/= 30 degrees - Q 2 hour turning - Passive/active range of motion - Legs dependent - PT consultation Move to Level 3 when the patient . Ronold Kanner -Tolerates active- assistance exercises 2 times per day 
  -Tolerates lower extremity exercises against footboard/Up to 20 degrees Reverse Trendelenburg 
  -Tolerates legs dependent /HOB 45 degrees Level 3 :  SIT  (Rass >- 1 (eg , weak but may move arms/legs independently) Full chair position (footboard on) ?  Full upright positioning allows for diaphragmatic excursion and lung expansion ? Sitting with legs in a dependent position facilitates gas exchange Additional activities to be implemented - Maintain HOB >= 30 degrees - Q 2 hour turning (assisted) - Active range of motion  PT/OT actively involved - Encourage activities of daily living 
- Dangling, if patient can move arm against gravity Move to Level 4 when the patient . Tommas Baptise - Tolerates increasing active exercise in bed - Actively assists with every- 2- hour turning or turns independently - Tolerates full chair position 3 times/day Level 4:  Stand ( RASS >0 (eg, weak but may tolerate increased activity) Stand Attempts ? Full chair position (footboard off/feet on the floor) ? Consider using a sit-to-stand lift ? Pivot to chair, it tolerates partial weight bearing Additional activities to be implemented - Maintain head of bed >= 30 degrees - Q 2 hr turning (self/assisted) - Active range of motion - Encourage activities of daily living 
- PT/OT actively involved Move to Level 5 when the patient . 
- Can successfully comply with all activities - Tolerates trial periods of full chair position (footboard off/feet on floor) 3 times per day - Tolerates partial weight-bearing stand and pivots to chair Level 5 :  Move  (RASS > 0    (eg, weak but may tolerate increased activity) Achieve out of bed ? Utilize mobile floor life to ambulate to bedside chair Additional activities to be implemented - Maintain HOB > = 30 degrees - Q 2 hour turning (self/assisted) - Active range of motion - Patient stands/bears weight > 1 minute - Patient marches in place 
- PT/OT actively involved Patient continues to ambulate progressively longer distances as tolerated until they consistently participate and move independently. E Approved by Critical Care Committee 2-19-09 [facility] Clinical Guidelines ABCDE DOC Flowsheet Content Variables to select when addressing section Comments ABCDE Initiated ? Yes/No  RN to address minimum q 24 hours (day shift) Target RASS ? 0 = alert and oriented ? -1 = drowsy ? -2 = light sedation ? -3= moderate sedation ? -4= deep sedation Target on standard ventilator setting should be -2; -4 with oscillator CAM -ICU ? Positive ? Negative ? Unable to assess Delirium assessment SAT Safety Screen Passed ? Yes 
? No Select yes if proceeding on to the sedation vacation (reduction of continuous sedative drip by directed by MD) Select no if your patient has any of the below reasons for not proceeding on to the daily awakening sedation vacation trial  
SAT Screen for Failure ? Active seizures ? Acute delirium tremors ? Agitation that threatens accidental line/tube removal 
? On paralytics ? MI (24-48hr) ? Abnormal ICP ? Open abdomen Select one of the options when the patient will not undergo the sedation vacation  ALSO MUST OBTAIN AN ORDER FOR no [place]er written under nursing miscellaneous for now by either the NP or Intensivist  
Daily sedation Vacation/assessment of  ? Yes 
? No 
? Not applicable If yes, MUST see the reduction in sedation on the [facility] and please place in the comment section of the sedative sedation vacation started SBT Safety Screen Passed ? Yes 
? No Select Yes if the patient has none of the below listed reasons for not proceeding on to the SBT following reduction of sedation SBT Screen Reason for Failure ? Agitation ? O2 Sat < or = 88% 
? FIO2 > 50% ? PEEP >7 
? MI 
? Vasopressor Use 
? Bilevel setting on Vent ? Oscillator in use ? Increased resp effort Select reason as appropriate for NOT proceeding on to the SBT Wake Up and Breathe Protocol

## 2020-12-11 NOTE — PROGRESS NOTES
Comprehensive Nutrition Assessment Type and Reason for Visit: Alejandro Pears Tube Feeding Management [facility] Pulmonary) Nutrition Recommendations/Plan:  
Enteral Nutrition Continue current TF regimen- Nepro via OGT @ 40ml/hr with 120ml water flush every 4 hours, continue 3 packets of prosource with 50ml free water flush following administration. Vitamin and Mineral Supplement Therapy: 
Electrolyte management replacement protocol implemented. Labs:  
EN labs: BMP daily, Mg and Phos tomorrow and MWF. Nutrition Related Medication Management Begin Miralax daily for bowel management Malnutrition Assessment: 
Malnutrition Status: At risk for malnutrition (specify)(NPO x 5 days. 9% weight loss within 3 months PTA.) Nutrition Assessment:  
Nutrition History: Unable to obtain nutrition history d/t isolation status. Nutrition Background: Pt admitted with acute respiratory failure secondary to covid 19. No significant PMH. It is noted that pt was undergoing workup for potential Parkinson's disease PTA. Pt intubated 12/7 and TF began 12/8. Daily Update: Pt remains intubated and sedated. Pt with waxing and waning amount of residuals, however not needed for TF to be held, TF continuing to run. Starting Miralax daily as pt without BM since 12/1. KUB 12/10 shows findings of mild to moderate constipation, possibly a reason behind increased gastric residuals. Pt continued worsening renal function, current TF formula provides lowest level of electrolytes possible. Pt planning for SLED this afternoon. Abdominal Status (last documented): Distended, Other (comment)(FIRM) abdomen with Hypoactive  bowel sounds. Last BM 12/01/20. Pertinent Meds: Diprivan, Pepcid, Fentanyl, SSI (no coverage needed since 12/11), Reglan Pertinent Labs:  
Lab Results Component Value Date/Time  Sodium 150 (H) 12/11/2020 04:48 AM  
 Potassium 5.6 (H) 12/11/2020 04:48 AM  
 Chloride 122 (H) 12/11/2020 04:48 AM  
 CO2 20 (L) 12/11/2020 04:48 AM  
 Anion gap 8 12/11/2020 04:48 AM  
 Glucose 147 (H) 12/11/2020 04:48 AM  
  (H) 12/11/2020 04:48 AM  
 Creatinine 3.99 (H) 12/11/2020 04:48 AM  
 Calcium 8.4 12/11/2020 04:48 AM  
 Albumin 1.6 (L) 12/11/2020 04:48 AM  
 Phosphorus 6.4 (H) 12/11/2020 04:48 AM  
Labs reviewed and increased electrolytes noted, pt planning for SLED today and will adjust electrolytes per nephrology. Nutrition Related Findings:  
NFPE deferred d/t isolation status. Current Nutrition Therapies: DIET NPO Current Tube Feeding (TF) Orders: · Feeding Route: Orogastric · Formula: Nepro · Schedule:Continuous · Regimen: 40ml/hr · Additives/Modulars: Protein(3 packets prosource per day) · Water Flushes: 120ml/every 4 hours · Current TF & Flush Orders Provides:  1848 kcal (100% estimated needs) 111 grams protein (100% estimated protein needs) and 1418ml free fluid (per renal) Current Intake: Additional Caloric Sources: Diprivan currently providing an additional ~145kcal at time of assesment. Anthropometric Measures: 
Height: 6' (182.9 cm) Current Body Wt: 79.6 kg (175 lb 7.8 oz)(Current weight appears to be an outlier. ), Weight source: Bed scale BMI: 23.8, Overweight (BMI 25.0-29. 9) Ideal Body Wt: 178 lbs (81 kg), 98.6 % Usual Body Wt: (222 lbs 10/2020 - potential 20 lb, 9% weight loss w) Estimated Daily Nutrient Needs: 
Energy (kcal): 7092-9156(20-25 kcal/d) (Kcal/kg, Weight Used: Current) Protein (g): 110-184 (1.2-2 gm/d ) Weight Used: (Current) Fluid (ml/day): 6877-8609 (1 ml/kcal) Nutrition Diagnosis:  
· Inadequate oral intake related to impaired respiratory function as evidenced by (NPO, intubated) Nutrition Interventions:  
Food and/or Nutrient Delivery: Continue NPO, Continue tube feeding Coordination of Nutrition Care: Continue to monitor while inpatient Plan of Care discussed with Selina Mcmahon Goals: Meet >75% of estimated needs within 7 days. Nutrition Monitoring and Evaluation:  
Food/Nutrient Intake Outcomes: Enteral nutrition intake/tolerance Physical Signs/Symptoms Outcomes: Biochemical data, GI status, Hemodynamic status Discharge Planning: Too soon to determine Kvng Plummer, Dietetic Intern 
[phone]

## 2020-12-11 NOTE — DIALYSIS
8 HR SLED initiated using  Right femoral catheter. Aspirated and flushed both catheter ports without problem. Machine settings per MD order. 150  DFR  250 UFR No Heparin. Reported to primary nurse. Dialysis nurse available as needed. 1800 Called back to room due to machine alarming frequently. Arterial line does not have blood return. Lines reversed. Very positional. Made sure patient knees were flat. Flushed both lines well.

## 2020-12-11 NOTE — PROGRESS NOTES
Ventilator check complete; patient has a #8. 0 ET tube secured at the 26 at the lip. Patient is sedated. Patient is not able to follow commands. Breath sounds are diminished. Trachea is midline, Positive for subcutaneous air, and chest excursion is symmetric. Patient is also Negative for cyanosis and is Positive for pitting edema. All alarms are set and audible. Resuscitation bag is at the head of the bed. Ventilator Settings Mode FIO2 Rate Tidal Volume Pressure PEEP I:E Ratio Pressure control  60 %  26     20 cm H2O  10 cm H20  1:1.2 Peak airway pressure: 31 cm H2O Minute ventilation: 17.5 l/min Alessandra Durham, RT

## 2020-12-11 NOTE — ROUTINE PROCESS
Respiratory Care Services Policy Number: [id number] Title: Oxygen Protocol Effective Date: 01/1996 Revised Date: 06/2013, 02/29/2016, 4/2018, 7/2019 Reviewed Date: 05/2014, 03/2015, 06/2017 I. Policy: The Oxygen Protocol will be initiated for all patients upon written order from physician for administration of oxygen therapy or if a patient is found to have an oxygen saturation of 88% or less. Special consideration: the goal of oxygen therapy for COPD patients is to maintain oxygen saturation between 88% - 92% to comply with GOLD Guidelines. II. Purpose: To provide protocol driven respiratory therapy for the administration of oxygen at concentrations greater than that in ambient air with the intent of treating or preventing the symptoms and manifestations of hypoxia. III. Responsibility: Director Respiratory Care Services, all Respiratory Care Practitioners IV. Indications: A. Implement this protocol for patients when physician orders oxygen to be administered or when patient is found to have an oxygen saturation of 88% or less. B. To assure routine monitoring of patient's oxygen saturation b.i.d. and to make appropriate adjustments in accordance with ordered oxygen saturation parameters. C. To assure continuity of respiratory care that meets [facility] Clinical Practice Guidelines and GOLD Guidelines. D. Hb < 8 
E. Sickle Cell anemia crisis V. Assessment:  Assess the following parameters to determine the need to adjust oxygen: A. Measurement of patient's oxygen saturation via pulse oximetry. B. Observation of patient's color, respiratory effort, and responsiveness. C. Measurement of heart rate and respiratory rate. D. Complete a three-step ambulatory oxygen saturation when ordered. VI. Initiation:  Upon receipt of an order for oxygen, the RCP will: A. Verify order in the patient's EMR, which should include the desired oxygen saturation to be maintained. B. The patient shall be placed on oxygen with humidity (except for those oxygen delivery devices that do not require humidity, i.e. venturi masks and non-rebreather masks) as ordered by the physician to achieve the prescribed oxygen saturation. C. In the event that no saturation is specified, a saturation of 90% will be maintained. D. Patients, who are found to have a SaO2 of 88% or less, may be started on supplemental oxygen as described above. E. Patients admitted with cardiac problems/disease shall be maintained at 92% per Chest Committee recommendation. F. The patient will be informed of the \"no smoking policy\" and instructed in the proper use of oxygen therapy. G. Once desired oxygen saturation has been achieved, the RCP will document FIO2 and oxygen saturation in the respiratory section of the patient's EMR. VII. Maintenance: A. 30-second oxygen saturation check will be taken to maintain the saturation ordered by the physician each day. B. Patients will be assessed each shift and as needed by pulse oximetry to determine if oxygen needs to be decreased, increased or discontinued. C. If changes in FIO2 are indicated, all changes will be documented in the respiratory section of the patient's EMR. D. If no changes in FIO2 are required, the patient's oxygen flow rate and saturation will be recorded in the respiratory section of the patient's EMR. E. Per [facility], patients who are receiving oxygen therapy but are not on oxygen at home, should be weaned off oxygen as soon as possible or when anticipated discharge becomes evident. Koko Farooq will be discontinued after oxygen saturation has been maintained for 24 hours on room air and documented in the patient's EMR. G. Patients on the Inpatient Rehabilitation area on 9th floor will be exempt from having their oxygen discontinued per protocol.  Oxygen may be weaned but will be changed to prn to meet the needs of the patient when exercising and participating in therapy. H. The goal of oxygen therapy is to maintain patients with a diagnosis of COPD at oxygen saturation between 88% - 92% to comply with GOLD Guidelines. VIII. Safety: RCP will address the following safety issues: A. Identify patient using the two patient identifiers name and birth date via ID bracelet. B. Perform hand hygiene per hospital policy utilizing Standard Precautions for all patients and following transmission-based isolation as indicated per hospital policy. C. Cardiac patients will be maintained at an oxygen saturation of 92%. D. If a patient's FIO2 requirements necessitate changing oxygen delivery devices to a high concentration of oxygen, documentation indicating the change must be included in the progress notes, as well as in the respiratory flowsheet. E. If a patient has a hemoglobin level <8 mg. RCP will consult physician before discontinuing oxygen. IX. Interventions: A. RCP will assess patient for signs of respiratory distress or suspicion of CO2 retention. B. An ABG may be obtained for patients exhibiting respiratory distress or sickle cell      crisis. C. An order should be entered into patient's EMR for ABG under per protocol. X. Documentation A. Document assessment findings in the respiratory section of the patient's EMR. B. Document changes in therapy per protocol in the respiratory orders section and in the care plan section of the patient's EMR. C. Document patient education in the patient education section of the patient's EMR. XI. Reportable Conditions:  Report to the physician immediately: A. Acute changes in patient's respiratory status. B. An oxygen saturation <85%. C. A change in oxygen delivery device to provide a high concentration of oxygen. XII. Patient Instructions: Review with Patient A. Purpose of oxygen therapy B. Proper technique for using oxygen C. No smoking policy Approval: Pulmonary Committee (1-25-96) Revision: Chest Committee (4-28-05) L - Respiratory Care Department Policy, Procedure and Protocol Guideline Manual, Toya, TARYN Euceda. L - Therapist Driven Respiratory Care Protocols  A Practitioner's Guide for Criteria-Based Respiratory Care by Cecil Valdez M.D., and TARYN Monahan RRT. L - The rationale for therapist-driven protocols: an update. Respiratory Care [facility]. [facility] Clinical Practice Guidelines. Respiratory Care Services Policy Number: -[id number] Title: Patient Care Assessment Protocol Effective Date: 01/1999 Revised Date: 05/2014, 04/2018, 12/2018, 07/2019 Reviewed Date: 06/2013/ 03/2015, 03/2016, 06/2017 Overview In an effort to improve quality and reduce costs of respiratory care at [facility], the Respiratory Department has developed a number of Patient Care Protocols. These protocols have been developed according to Nia 3 and are utilized for those patients who are ordered respiratory therapy using therapeutic indications and standardized approaches for accomplishing objectives. Patient Care Protocols are intended to improve care by: ? Defining the indications and standards of care agreed upon by the Pulmonary Medicine and [facility]. ? Training respiratory care practitioners to apply those criteria to individual patients and modify therapy as indicated by the protocols. ? Documenting the indication and care plan as part of the initial ordering process. ? Tapering or discontinuing treatments once the indication for therapy changes. The Patient Care Protocols shall be universally applied throughout the hospital as determined by  
the Pulmonary Medicine and [facility] [address] Rationale for Patient Care Assessment Protocols: 
? Continuous Quality Improvement ? Cost containment ? Standardization of care 
? Enhanced continuity of care ? Utilization review ? Timely intervention The following patient care assessment protocols have been developed: ? Aerosolized Medication Protocol http://[facility] SystemCirc[facility]/stfrancis/policies/Respiratory%20Care%20Services%20Policies/-[id number]. doc ? Bronchial Hygiene Protocol http://spb/[facility]Circ[facility]/stfrancis/policies/Respiratory%20Care%20Services%20Policies/-[id number]. doc 
? Oxygen Protocolhttp://spb/[facility]/stfrancis/policies/Respiratory Care Services Policies/-[id number]. doc http://spb/[facility] SystemCircular EnergyGraysv[place]/[facility]/stfrancis/policies/Respiratory%20Care%20Services%20Policies/-[id number]. doc 
? CVRU Fast Track Weaning Protocol http://spb/[facility]/stfrancis/policies/Respiratory%20Care%20Services%20Policies/-[id number]. doc ? Asthma Treatment Protocol ERhttp://spb/[facility]/stfrancis/policies/Respiratory Care Services Policies/-[id number]. doc http://spb/[facility] SystemCirc[facility] EnergyGr[place]/[facility]/stfrancis/policies/Respiratory%20Care%20Services%20Policies/-[id number]. doc ? Pediatric Asthma Treatment Protocol ERhttp://spb/[facility]/stfrancis/policies/Respiratory Care Services Policies/-[id number]. doc http://spb/local[place]/[facility]/stfrancis/policies/Respiratory%20Care%20Services%20Policies/-[id number]. doc ? Alpha-1 Antitrypsin Deficiency Protocolhttp://spb/local[employer]/gvl/stfrancis/policies/Respiratory Care Services Policies/-[id number]. doc http://[facility]/stancis/policies/Respiratory%20Care%20Services%20Policies/-[id number]. doc ? Prone Positioning Protocol http://[facility] Hospitalis/policies/Respiratory%20Care%20Services%20Policies/-[id number]. doc 
? COPD Protocol http://[facility] Hospitalis/policies/Respiratory%20Care%20Services%20Policies/-[id number]. doc 
?  Home Oxygen Assessment Protocolhttp://[facility]/stfrancis/policies/Respiratory Care Services Policies/-[id number]. doc http://spb/[facility]/stfrancis/policies/Respiratory%20Care%20Services%20Policies/-[id number]. doc 
? Ventilator Weaning Protocol http://spb/[facility]/stfrancis/policies/Respiratory%20Care%20Services%20Policies/-[id number]. doc ? Lung Volume Expansion Protocolhttp://spb/[facility]/stfrancis/policies/Respiratory Care Services Policies/-[id number]. doc http://[facility]/stfrancis/policies/Respiratory%20Care%20Services%20Policies/-[id number]. docm The Director of [address] [place] [address] oversees the Patient Care Assessment Program. The Respiratory Educator is responsible for protocol development and training. The Supervisor is responsible for implementation and  activities. Each patient with an order for respiratory treatments will receive an evaluation. Respiratory Care Practitioners (RCP's) will perform the evaluations. The same evaluation tool will be utilized for initial and follow-up assessments. If the patient does not meet criteria for ordered therapy, the therapy will be discontinued. If the patient demonstrates an adverse response to initially ordered therapy, the therapy will be discontinued and the physician will be contacted. Specific physician's orders that deviate from protocols and are deemed \"inappropriate\" or \"unsafe\" will be addressed with ordering physician and/or medical director as required. Respiratory Patient Care Assessment Protocols I. Policy:   In an effort to provide quality patient care and effective utilization of services, physicians who order respiratory therapy will have their patients treated via the protocols established (see attached) Respiratory Care Practitioners (RCP's) will complete the initial assessment which will indicate patient needs,  the care plan developed and will performed within 24 hours of admission. Frequency of the therapy will be set according to the results of the respiratory therapy evaluation and frequency guidelines policy. Reassessment will be continued every 48 hours and more frequently as needed for the individual patient. II. Purpose: F. To provide a process that will allow for ongoing assessment and care plan modification for patients receiving respiratory services based on both objective and or subjective patient responses to interventions. This process of protocol utilization will assist in patient care progression while eliminating the need for the physician to continually update respiratory therapy orders. G. To assure continuity of respiratory care that meets [facility] Clinical Practice Guidelines. III. Initiation:  Implement Respiratory Care Protocols for patients who are ordered by physician  
       to receive respiratory therapy procedures or for ventilator management. IV. Protocol: 
E. Upon receiving an order for therapy the RCP will review the patient's EMR (electronic medical record) for all pertinent information includin. Physician's order for therapy 2. Patient history and physical examination 3. Physician progress notes 4. Diagnostic. X-rays, PFT's, arterial blood gases etc. 
F. The RCP will perform a respiratory assessment in the following manner: 1. General observations: color, pattern and effort of breathing, chest expansion, (symmetrical and bilateral), level of consciousness and the ability to ambulate. 2. The RCP will assess patient's cough ability and determine if bronchial hygiene is needed. If patient is unable to produce sputum, at that time, the RCP should question the patient with regard to their sputum: production, color consistency, frequency and amount.  
3. Auscultation: Using a stethoscope, the RCP will listen and note quality of breath sounds and presence or absence of adventitious breath sounds in all lung fields, both anteriorly and posteriorly. 4. Upon completing the EMR review and physical assessment, the RCP will document findings in the RT Assessment section of the EMR. The score level will be provided and will be used to determine the frequency of therapy. V. Indications: A. Bronchial Hygiene Protocol indications: 1. Potential for or presence of atelectasis. 1. Need for hydration and removal of retained secretions. 1. Need for improvement of cough effectiveness. 1. Presence of conditions associated with disorder of pulmonary clearance: 
a. Cystic fibrosis b. Bronchiectasis 
c. Neuromuscular disease 
d. Obstructive lung diseases 
e. Restrictive lung diseases Aerosolized Medication(s) Protocol indications:Treatment of bronchospasm/wheezing 2. Improvement of mucociliary clearance 3. Treatment of stridor 4. History of Bronchiectasis, Asthma or COPD 
C. Oxygen Therapy Protocol indications: 1. Documented hypoxemia 2. Severe trauma 3. Acute myocardial infarction 4. Short-term therapy (e.g. post anesthesia recovery) D. CVRU Ventilator Weaning Protocol indications: 1. All mechanically ventilated surgical patients unless they have a no wean order. E. Asthma Treatment Protocol ER indications: 1. Patients 15years of age and older that have been triaged or diagnosed with   asthma exacerbation shall be indicated for the ER Asthma Treatment Protocol. A physician order will be required to initiate the protocol. F. Pediatric Asthma protocol in the ER indications: 1. Patients less than 15years old that have been triaged or diagnosed with asthma exacerbation shall be indicated for the Pediatric Asthma protocol. A physician order will be required to initiate the protocol. G. Alpha-1 Antitrypsin Deficiency Testing protocol indications: 1. Patients admitted and diagnosed with COPD. H. Prone Positioning Protocol indications: 
       1. Acute lung injury 2. Acute respiratory distress syndrome (ARDS) I. Respiratory Care COPD Protocol indications: 1. History of COPD in patient's records 2. Smoking history J. Home Oxygen Assessment Protocol indications: 1. Chronic lung disease 2. Cor pulmonale 3. Unable to wean to room air 48 hours prior to anticipated discharge. K.  Ventilator Weaning Protocol indications: 1. Patient's mechanically ventilated 2. Managed by intensivist 
L. Lung Volume Expansion Protocol indications: 
      1. Any patient at risk for pulmonary complications. VI. Maintenance: H. Timely patient assessment is an integral part of this protocol therefore the following will be applied: 1. All non- critical care patients will be evaluated upon receiving initial respiratory care orders within 24 hours and re-evaluated within 48 hours (or more as needed). 2. Orders requesting a Respiratory Consult will be responded to in the following manner: 
a. In patient emergency situations, the RCP assigned to the floor will respond immediately to the patient, provide an initial respiratory assessment, and contact the patient's physician as necessary for appropriate orders. b. In non-emergent situations, the RCP assigned to the floor will respond to the patient within 90 minutes and provide an initial respiratory assessment and contact patient's physician as necessary for appropriate orders. c. An RCP will provide a comprehensive assessment as soon as possible. 3. Upon completion of an evaluation, the RCP will complete documentation in the patient's EMR in the RT Assessment section. 4. The RCP who completes the assessment will document orders for therapy in the orders section of the patient's EMR selecting new order. Next, per protocol should be selected indicating it is a protocol order and sign orders should be selected to complete the process.  The applicable protocol must be added to the progress note per Joint Commission guidelines. 5. The Pharmacy and Therapeutics (P&T) Committee has mandated that the medication Xopenex may be changed to unit dose albuterol without an order, except for those patients receiving Xopenex due to cardiac arrhythmias. 1. The dosage for these patients should be 0.63 mg. and may be changed from 1.25 mg. to 0.63 mg per P & T Committee by the RCP completing the assessment. 6. Patients who are not experiencing cardiac arrhythmias, and are ordered Xopenex and Atrovent may be changed to Duoneb. VII. Safety:       
I. The following safety issues shall be monitored: 1. The RCP will perform hand hygiene per hospital policy utilizing Standard Precautions for all patients and following transmission-based isolation as indicated per hospital policy. 2. The RCP must exercise professional judgment in classifying the patient for frequency of therapy. 3. Appropriate classification of the patient will require an evaluation utilizing the Therapy Assessment Protocol Guidelines. 4. The RCP will confer with the physician concerning the care of the patient at any time questions or problems arise. 5. If during therapy, the patient exhibits no improvement, or deterioration in clinical status the RCP will notify the physician and the patient's nurse. VIII. Interventions:  
F. The patient's nurse is responsible concerning all items related to his/her care. Ongoing communication with nursing is essential to successful protocol management. G. The RCP recognizes the value of the team approach in meeting the patient's needs. Nursing input regarding the patient's pulmonary condition will be sought as needed. IX. Reportable conditions: The RCP will inform the physician if: 1. There are acute changes in patient's respiratory status. 2. The therapist is unable to determine appropriate care plan upon assessment. 3. The patient fails to reach therapeutic objective. 4. A change or additional medication is needed. X.  Patient Education:   
D. Patient will receive instruction on the followin. The treatment modality, including objectives and proper technique of therapy 2. Respiratory medications E. Documentation shall occur in the patient education section of the patient's EMR. XI. Documentation: Record all findings as described above in the patient's EMR. Related Protocols: A. Aerosolized Medication Protocol B. Bronchial Hygiene C. Oxygen Protocol D. [facility] Fast Track Weaning Protocol E. Asthma Treatment protocol ER 
F. Alpha-1 antitrypsin Deficiency Protocol G. Prone Positioning Protocol H. Respiratory Care COPD Protocol I. Home Oxygen assessment Protocol J. Ventilator Weaning Protocols K. Volume Expansion protocol Indications      Frequency          Level A. Aerosol therapy 1.  q4h     Severe SOB, wheezing, unable to sleep 1 2.  qid, q4 wa or q6h   Moderate SOB, wheezing   2 3.  tid      Hx of asthma, or COPD mild wheezing,  
      or facilitate secretion removal              3 
 4.  bid      Asthma, or COPD, Intermittent wheezing 4 5. PRN, i.e. tid PRN, qid PRN Asthma, or COPD, occasional wheezing 5 B. Bronchopulmonary Hygiene 1. q4h             Copious secretions, SOB, unable to sleep 1 2. qid & PRN            Moderate amounts of secretions   2 3. tid           Small amounts of secretions and poor cough,   
           history of secretions    3 4. PRN, i.e. tid PRN, qid PRN     Breathing exercises, encourage cough only 4  
  
C. Oxygen Therapy     Follow hospital approved Oxygen Protocol Note: 
qid treatments are due 0800, 1200, 1600, and 2000. tid treatments are due 0800, 1400, and 2000 Q6h treatments are due 0800, 1400, 2000, 0200 Q4 wa teatments are due 0800, 1200, 1600, and 2000. Q4h treatments are due  0800, 1200, 1600, 2000, 0000, and 0400. The Level 1-5 rating system is only to be used as criteria for determining appropriate frequency of therapy. References:  
[address] Standard L    Respiratory Care Department Policy, Procedure and Protocol Guideline Manual, 1995, TARYN COHEN  Therapist Driven Respiratory Care Protocols  A Practitioner's Guide for Criteria-Based Respiratory Care by Carissa Contreras M.D., and YAMILEX Ritter  The rationale for therapist-driven protocols: an update. Respiratory Care 1998; 22:995-689 L Therapist Driven Respiratory Care Protocols  A Practitioner's Guide for Criteria-Based Respiratory Care by Carissa Contreras M.D., and YAMILEX Ritter The rationale for therapist-driven protocols: an update. Respiratory Care 1998; [id number]. N   [facility] Clinical Practice Guidelines. D. The RCP will perform a respiratory assessment in the following manner: 1. Perform hand hygiene per hospital policy utilizing Standard Precautions for all patients and following transmission-based isolation as indicated per policy. 2. Identify patient via ID bracelet verifying patient name and birth date. 3. General observations: color, pattern and effort of breathing, chest expansion, (symmetrical and bilateral), level of consciousness and the ability to ambulate. 4. The RCP will assess patients cough ability and determine if Nasotracheal suctioning is needed. If patient is unable to produce sputum, at that time, the RCP should question the patient with regard to their sputum: production, color consistency, frequency and amount. 5. Auscultation: Using a stethoscope, the RCP will listen and note quality of breath sounds and presence or absence of adventitious breath sounds in all lung fields, both anteriorly and posteriorly. 6. Upon completing the EMR review and physical assessment, the RCP will document findings in the RT Assessment section of the EMR.  The score level will be provided and will be used to determine the frequency of therapy. V. Indications: A. Indications for Bronchial Hygiene Protocol will include: 1. Potential for or presence of atelectasis. 2. Need for hydration and removal of retained secretions. 3. Need for improvement of cough effectiveness. 4. Presence of conditions associated with disorder of pulmonary clearance: 
a. Cystic fibrosis b. Bronchiectasis B. Indications for Aerosolized Medication(s) Protocol should include: 1. Treatment of bronchospasm/wheezing 2. Improvement of mucociliary clearance 3. Treatment of stridor 4. History of Asthma or COPD 
           C.  Indications for Oxygen Therapy Protocol should include: 1. Documented hypoxemia 2. Severe trauma 3. Acute myocardial infarction 4. Short-term therapy (e.g. post anesthesia recovery) VI. Maintenance:   
D. Timely patient assessment is an integral part of this protocol therefore the following will be applied: 1. All non- critical care patients will be evaluated upon receiving initial respiratory care orders within 24 hours and re-evaluated within 48 hours (or more as needed). 2. Orders requesting a Respiratory Consult will be responded to in the following manner: 
a. In patient emergency situations, the RCP assigned to the floor will respond immediately to the patient, provide an initial respiratory assessment, and contact the patients physician as necessary for appropriate orders. b. In non-emergent situations, the RCP assigned to the floor will respond to the patient within 90 minutes and provide an initial respiratory assessment and contact patients physician as necessary for appropriate orders. c. An RCP will provide a comprehensive assessment as soon as possible. 3. Upon completion of an evaluation, the RCP will complete documentation in the patients EMR in the RT Assessment section.  
4. The RCP who completes the assessment will document orders for therapy in the orders section of the patients EMR selecting new order. Next, per protocol should be selected indicating it is a protocol order and sign orders should be selected to complete the process. 5. The Pharmacy and Therapeutics (P&T) Committee has mandated that the medication Xopenex may be changed to unit dose albuterol without an order, except for those patients receiving Xopenex due to cardiac arrhythmias. The dosage for these patients should be 0.63 mg. and may be changed from 1.25 mg. to 0.63 mg per P & T Committee by the RCP completing the assessment. 6. Patients who are not experiencing cardiac arrhythmias, and are ordered Xopenex and Atrovent may be changed to Duoneb. VII. Safety: A. The following safety issues shall be monitored: 1. The RCP will perform hand hygiene per hospital policy utilizing Standard Precautions for all patients and following transmission-based isolation as indicated per hospital policy. 2. The RCP must exercise professional judgment in classifying the patient for frequency of therapy. 3. Appropriate classification of the patient will require an evaluation utilizing the Therapy Assessment Protocol Guidelines. 4. The RCP will confer with the physician concerning the care of the patient at any time questions or problems arise. 5. If during therapy, the patient exhibits no improvement or deterioration in clinical status the RCP will notify the physician and the patients nurse. VIII. Interventions: A. The patients nurse is responsible concerning all items related to his/her care. Ongoing communication with nursing is essential to successful protocol management. B. The RCP recognizes the value of the team approach in meeting the patients needs. Nursing input regarding the patients pulmonary condition will be sought as needed. IX. Reportable conditions: The RCP will inform the physician if: 1. There are acute changes in patients respiratory status. 2. The therapist is unable to determine appropriate care plan upon assessment. 3. The patient fails to reach therapeutic objective. 4. A change or additional medication is needed. X.  Patient Education: A. Patient will receive instruction on the followin. The treatment modality, including objectives and proper technique of therapy 2. Respiratory medications B. Documentation shall occur in the patient education section of the patients EMR. XI. Documentation: Record all findings as described above in the patients EMR. Related Protocols: A. Aerosolized Medication Protocol B. Bronchial Hygiene C. Oxygen Protocol D. Volume Expansion/Secretion Clearance E. Ventilator Weaning Protocols References: 
[address] Standard L    Respiratory Care Department Policy, Procedure and Protocol Guideline Manual, , TARYN COHEN  Therapist Driven Respiratory Care Protocols  A Practitioners Guide for Criteria-Based Respiratory Care by Regina Garibay M.D., and YAMILEX Miller  The rationale for therapist-driven protocols: an update. Respiratory Care 1998; 96:671-096 N   [facility] Clinical Practice Guidelines. Respiratory Care Services Policy Number: -[id number] Title: Bronchial Hygiene Protocol Effective Date: 1999 Revised Date: 2014, 2017, 2019 Reviewed Date: 2013, 2014, 2015, 2016, 2017, 2018 I. Purpose: The Respiratory Care Practitioner (RCP) will utilize the following protocol to select and initiate bronchial hygiene therapy to open and maintain obstructed airways when indicated. II. Patients: All patients who are ordered bronchial hygiene therapy. III. Clinical Area: All general patient floors IV. Protocol: The following conditions or diseases are indications for bronchial hygiene  
                        therapy. H. Oscillating PEP Therapy Indications should include: 5. Atelectasis caused by mucus plugging or foreign body 6. Chronic mucociliary clearance disorders 7. Retained secretions which may be associated with the following conditions: 
f. Bronchitis 
g. Bronchiectasis 
h. Pneumonia I. PAP- Positive airway pressure therapy Indications include: 5. Patients with post-operative atelectasis or to prevent post operative atelectasis. 6. Patients who cannot perform deep breathing exercises due to pain. 7. Patients requiring lung expansion therapy who cannot follow instructions. 8. Patients requiring lung expansion therapy with poor inspiratory capacity <10cc/kg. 9. Patients requiring aerosol therapy in conjunction with opening their airways. Josie Kvng / Acoustical Airway Clearance Therapy (ACT)- i.e. (Vibralung, Vest, or Percussor) Indications should include 5. Patient conditions  that involve retained secretions, increased mucus production and defective mucociliary clearance such as: 
d. Cystic fibrosis 
e. Chronic bronchitis 
f. Bronchiectasis 
g. Pneumonia 
h. Asthma 
i. Muscular dystrophy 
j. Post-operative atelectasis 
k. Neuromuscular respiratory impairments 
l. ACT may be considered in patients with COPD with 
symptomatic secretion retention, guided by patient preference, 
toleration, and effectiveness of therapy Keo Chaidez et al., 2013). K. Nasotracheal suctioning indications should include: 
5. Inability to cough effectively 6. Excessive secretions 7. Artificial airway V. Equipment: A. PEP therapy device B. Vest therapy equipment Abdias Andrews F. NT suction equipment VI. Guidelines:   Monitor patient's vital signs and evaluate patient's clinical status. The need to  
                             change therapy modality may be indicated by: 
Nahid Lomeli in patient's sensorium (patient now confused or obtunded, and unable to follow directions). H. A significant deterioration is evident on patient's chest radiograph or increased sputum production. I. Increased thickening of secretions (e.g. mucolytic therapy may be indicated.) J. Development of wheezing K. Decrease in oxygen saturation L. Development of chest pain. VII. Clinical Responsibility: The Therapy Assessment Protocol guidelines will be used to  
             re-evaluate all patients on bronchial hygiene therapy (See Therapy Assessment Protocol). I. RCP's will perform changes in therapy according to protocol. Tyree Sánchez J. Bronchial hygiene therapy may be discontinued when goals of therapy are met, e.g., secretions easily expectorated for 48 hours, atelectasis is resolved, etc. 
K. PAP Therapy may be utilized in place of IPPB therapy per discretion of the RCP, as approved by the Pulmonary Medicine and [address] [place] [address]. VIII. Outcome Criteria:  Outcome criteria for bronchial hygiene therapy should include: J. Decrease in sputum production K. Improved breath sounds L. Improved arterial oxygen tension and/or SaO2 M. Improved chest X-ray N. Subjective response to therapy IX. Documentation D. Document assessment findings in the respiratory assessment section of the patient's EMR. E. Document changes in therapy per protocol in the respiratory orders section and in the care plan section of the patient's EMR. F. Document patient education in the patient education section of the patient's EMR. X. Related Protocols: 2. Respiratory Patient Care Assessment Protocols 2. Aerosolized Medication Protocol 2. Oxygen Therapy Protocol Reference: L - Respiratory Care Department Policy, Procedure and Protocol Guideline Manual, 1995, TARYN Euceda. L - Therapist Driven Respiratory Care Protocols  A Practitioner's Guide for Criteria-Based Respiratory Care by Rosanna Rothman M.D., and TARYN Briggs, RRT. N  [facility] Clinical Practice Guidelines. Hector Beck., Sabina Black., Tati Jimenez., Kimberly Snow., Elizabeth Mitchell., . . . FRANCISCO J Sharif (2013, December). [facility] Clinical Practice Guideline: Effectiveness of Nonpharmacologic Airway Clearance Therapies in Hospitalized Patients. Respiratory Care, 58(50), 6855-2693. Retrieved June 28, 2019 Guideline Guideline Number: -[id number] Title: Management of the Patient with Mechanical Ventilation (including weaning) and ABCDE Bundle Effective Date:  03/00 Revised Date: 02/09, 03/10, 7/12, 5/13,                                  10/13, 8/14 Reviewed Date: 07/2015, 04/2016, 06/2017 I. Policy:  Management of the patient requiring mechanical ventilation, including readiness to wean and weaning protocol. The information provided serves as a guideline for patient management. Included in this guidelines is the ABCDE Bundle to provide guidance to staff for evidence based management of pain, agitation/anxiety and delirium in the intensive care unit. The goals of critical care analgesia and sedation are to facilitate mechanical ventilation, to prevent patient and caregiver injury, and to avoid the psychological and physiologic consequences of inadequate treatment of pain, anxiety, agitation, and delirium by maintaining a light level of sedation. Pain occurs commonly in adult ICU patients, regardless of admitting diagnosis. Therefore, pain should be frequently assessed and analgesic medications titrated to prevent adverse effects associated with either inadequate or excessive analgesia. Once pain has been addressed, anxiolytic and/or antipsychotic medications can be utilized to treat unresolved agitation/anxiety and delirium, with the goal to prevent over- or under sedation by using the Dhillon Agitation Sedation Scale (RASS).   Assertive management of these issues has been shown to reduce costs, improve ICU outcomes such as successful extubation and ICU length of stay, and allow for patients to participate in their own care. II. Purpose: The respiratory care practitioner and the critical care RN will utilize the following guideline to provide the most efficient and effective management of mechanical ventilators and weaning and extubation processes. Goals of Treatment: 1. Adequate management of patients pain and discomfort while maintaining a light level of                   sedation (RASS score of 0 to -1) 2. Both chronic and acute sources of pain should be identified and treated. 3. Sedative agents should be considered if patient still expresses discomfort and/or is not at RASS         goal of 0 to -1 despite adequate management of pain. 4. Patients requiring neuromuscular blockage must have continuous infusions of analgesic and              sedative agents. III. Responsibility: Director Respiratory Care Services and all Respiratory Care Practitioners  with documented competency as well as Critical Care RN staff. General Guidelines 1. Introduction to Ventilator Plan Phase 
a. Ventilator Management Phase, General Statement -  The plan should be initiated in patients who have a secure airway/require invasive mechanical ventilation (endotracheal or tracheostomy) only -   The provider determines the appropriate medications used for analgesia and agitation/anxiety along with the clinical pharmacist 
 
2. Monitoring Levels of Comfort 
a. Pain Assessment 
- Pain is monitored using the numerical scales - A pain assessment should be conducted, at a minimum, every 4 hours and as needed and per guidelines. - The level of pain should be determined as satisfactory by the patient based on patients baseline level of pain , considering any chronic pain that the patient may have.  
- If the patient is unable to communicate pain level, the nurse can assess for nonverbal indicators including facial grimacing, moaning, tachypnea, tachycardia, hypertension, diaphoresis, etc as a cue to begin further pain assessment. b.  Sedation Assessment - Sedation is monitored using the Dhillon Agitation Sedation Scale (RASS) Target RASS RASS Description +4 Combative, violent, danger to staff 
  
+3 Pulls or removes tubes(s) or catheters; aggressive +2 Frequent nonpurposeful movement, fights ventilator +1 Anxious, apprehensive, but not aggressive  
0 Alert and calm  
-1 Awakens to voice (eye opening/contact) > 10 sec  
-2 Light sedation, briefly awakens to voice (eye opening/contact) < 10 sec  
-3 Moderate sedation, movement or eye opening. No eye contact  
-4 Deep sedation, no response to voice, but movement or eye opening to physical stimulation  
-5 Unarousable, no response to voice or physical stimulation  
 
-  Goal RASS is 0 to -1, unless otherwise specified by providers order. 
- Nursing staff should conduct the RASS every 4 hours and as needed to maintain goal RASS of 0 to -1. 
- If RASS is outside of goal range, discuss treatment options with provider. 
- A RASS score of +2 to +4 requires further assessment by the nurse. Causes of agitation/anxiety that should be considered include: 
a. Pulmonary -   endotracheal tube malposition or patency, mode of ventilation, pneumothorax, hypoxemia, hypercarbia 
b. Metabolic  hypoglycemia, hyponatremia, acute renal or hepatic failure 
c. Emotional upset  with information and awareness of critical condition, prognosis, need for surgical or invasive procedures, other interventions or complications, family or personal stressors 
- C. Sedation Assessment while using Neuromusclar Blocking Agents 
- D. Delirium Assessment 
a. the ICU (CAM  ICU) 3. Analgesia The incidence of significant pain has been reported to be 50% or higher in both medical and surgical ICU patients.   These patients also experience discomfort during routine/procedural ICU care and at rest.  However, patients may be unable to self-report their pain (either verbally or with other signs) because of an altered level of consciousness, the use of mechanical ventilation, or high doses of sedative agents or neuromuscular blocking agents. The short and long term consequences of unrelieved or inadequately treated pain are significant and include patient discomfort, decreased satisfaction with care by family and patient, delirium, agitation/anxiety, post traumatic stress disorder and depression. Therefore, routine assessment and treatment of pain should occur in all ICU patients. Causes and Treatment of Pain in the ICU 
a. Acute pain (post-operative, procedural pain, discomfort with usual ICU care or other acute episodes of pain-related to underlying disease) 1. Consider use of PCA for alert and oriented patients with pain needs not met by PRN dosing or opoids. 2. Preemptive analgesia should occur prior to chest tube removal, and should be considered for other procedural pain such as turning and repositioning, would drain removal, wound dressing change, tracheal suctioning, femoral catheter removal or place of central venous catheter. 3. Appropriate analgesic medications for preemptive analgesia are short acting intravenous (IV) agents (i.e. fentanyl, morphine, hydromorphone) a. Administration of analgesia before patient experiences noxious stimuli prevents amplification and hyperexcitability of the central nervous system. b. Analgesia for Mechanically Ventilated Patients: 
1. The approach to sedation and analgesia management for mechanically ventilated patients favors use of analgesia first sedation. The primary goal of this strategy is to address pain and discomfort first, and then if necessary, add anxiolytic agent.  
2. Analgesia first sedation reduces dose escalation of medications, decreases the duration of mechanical ventilation and the incidence of VAP, improves the probability of successful extubation, and ultimately shortens ICU length of stay. 3. For pain management, analgesic medications are determined by the provider. Intermittent dosing of the analgesic should be attempted first.   
If the patient requires more than 3 doses within 1 hour then provider should be contacted to consider continuous infusion. 4.  Analgesic options for mechanically ventilated patients include: 
a. Fentanyl which is considered the drug of choice for patients requiring continuous infusion. b.Morphine may be considered for those patients without renal dysfunction who are hemodynamically stable and require intermittent pain medication. Continuous infusions of morphine may be used for patientl who are receiving comfort care as part of end of life care. c.Hydromorphone is reserved for patients who are refractory to fentanyl or morphine and is typically admininstered by intermittent dosing. 4.  Agitation/Anxiety Background Agitation and anxiety frequently occur in ICU patients. Anxiolytic/sedation agents may be indicated to help relieve discomfort, improve synchrony with mechanical ventilation, and decrease the overall work of breathing. Pain control alone may be sufficient to make patients comfortable enough to require no anxiolytic/sedative agent. In addition, non-pharmacologic interventions such as repositioning or verbal assurance may be helpful to comfort or redirect an agitated patient. If these methods are unsuccessful, then anxiolytic/sedative medications such as propofol, dexmedetomidine, or benzodiazepines can be used.  Selection of an anxiolytic should be based on the pharmacokinetic properties of the medication, patient specific characteristics, and sedation goal.   However, nonbenzodiazepine sedatives (ie propofol or dexmedetomidine) may be preferable over benzodiazepines (ie midazolam or larazepam) due to more favorable outcomes such as delirum. Causes and Treatment of Agitation/Anxiety 
a. Possible underlying causes of agitation and anxiety include pain, delirium, hypoxemia, hypoglycemia, hypotension, or withdrawal from alcohol  and other drugs. b. Analgesia first sedation should be attempted initially to manage pain and provide sedation in appropriate patients. Analgesia alone may be adequate to reach RASS goal of 0 to -1. If patient remains agitated or anxious despite adequate analgesia (ie RASS +2 to +4) then anxiolytic/sedative should be considered. c. The choice of anxiolytic should be based on desired levels of sedation (ie light sedation or deep sedation) with preference for the use of nonbenzodiazepines such as propofol or dexmedetomidine if appropriate. While light sedation (ie RASS 0 to -1) is preferred for most patients, there are instances when deep sedation (ie RASS -4 to -5) is desired. For example, in the setting of ventilator dysynchrony due to ARDS or for patients receiving NMB agents. d. Medications to maintain light sedation (ie RASS 0 to -1) include 1. Propofol continuous infusion can be considered for hemodynamically stable (ie SBP = 100, MAP = 65 and/or not requiring vasopressor support) patients requiring light sedation. Propofol has a quick onset (1-2 minutes) and offset of action, making it a good agent to assess neurological status and facilitate liberation from the mechanical ventilator. 2.Dexmedetomidine continuous infusion is a good option for hemodynamically stable patients requiring light sedation as it allows for a more awake, interactive patient is associated with less delirium. It has an intermediate onset of action (5-10 min). Therefore, abrupt titrations should be avoided, but use of prn haloperidol or benzodiazepine may be useful to manage agitation until the medication takes effect. 3. Antipsychotics are another option. In particular, haloperidol intermittently dosed may be useful for patients with symptoms of agitation/anxiety and delirium. 4.Benzodiazapines can also be considered for light sedation, but should be intermittently doses. Midazolam is an option for patients without renal dysfunction. It has a short onset of action (2-5 minutes) making it a good agent for acute agitation/anxiety, but short duration of action resulting in frequent dosing. Lorazepam is another option. It has a longer onset of action (15-20 minutes) in comparison to midazolam, but longer duration of action. e. Medications to maintain deep sedation (RASS -4 to -5) include: 
1) Propofol continuous infusion should be considered as a first line option for hemodynamically stable patients. 2)Benzodiazepines can be considered as second line options for deep sedation. Studies comparing these agents to other sedatives have shown that they lead to worse outcomes including delirium, oversedation, delayed extubation, and longer time to discharge. Midazolam is one option for patients without renal dysfunction and lorazepam is another options. If patient requires more than 3 doses within 1 hour then contact provider  to consider initiation of continuous infusion. 5.  Daily Sedation Awakening Trial (SAT) from IV Continuous Analgesia/Sedation 
a. Patients are to have daily awakening from sedation while on continuous IV analgesia and/or sedation in the ICU. Continuous analgesia infusions may be maintained only if needed for active pain and RASS is at goal 0 - -1. Unit guideline is for the SAT to occur following ICP rounds each morning.   
b. The sedation awakening trial (SAT) is done regardless if the patient meets criteria for spontaneous breathing trial (SBT).  
c. SAT safety screen is assessed and SAT should not be performed if sedation is being used for active seizures, alcohol withdrawal, hemodynamically unstable or requiring support of vasoactive medications , in conjunction with NMB agents, if ICP is greater than 
20mmHg or if sedation is being used to control ICP, patients RASS is +3 or +4 (very agitated or combative). Other exclusion criteria are:  if there is documentation of myocardial ischemia in the past 24 hours; or patient is receiving high frequency oscillator ventilation (HFOV) , if the patient has an open chest /abdomen or is receiving comfort care. d. Criteria for passing the SAT are the patient opened their eyes to verbal stimuli or tolerated sedative interruption without exhibiting failure criteria. 
e. Patients fail the SAT if the develop sustained anxiety, agitation, or pain; a respiratory rate of 35 per minutes for 5 minutes or longer, an SpO2 less than 88% for 5 minutes or longer; an acute cardiac dysrhythmia; two or more signs of respiratory distress including tachycardia, bradycardia; use of accessory muscles; diaphoresis; marked dyspnea; or myocardial ischemia. f. Respiratory therapy staff must verify with the nurse that continuous IV analgesia (unless being use  for active pain) and sedation (unless patient is receiving dexmedetomidine) is off prior to placing patient on SBT. g. DO NOT interrupt infusion of analgesia and sedation medications if patient is receiving neuromuscular blockade. 
h. Monitor level of wakefulness unless patient is awake and follows commands (RASS 0 to -1) or patient becomes uncomfortable or agitated (RASS +3 to +4) 
i. If agitation prevents successful awakening , administer bolus of analgesia and/or sedation then resume infusion of the medication at ½ previous dose and titrate as needed. 
j. If oversedation prevents successful awakening, hole infusion until at goal and resume ½ of prior infusion rate/dose if clinically indicated. 6. Delirium Background Delirium is characterized by the acute onset of cerebral dysfunction with a change or fluctuation baseline 
mental status, inattention, and either disorganized thinking or an altered level of consciousness. It affects up to 80% of mechanically ventilated adult ICU patients, and is associated with increased mortality,  
and treatment is important and may in turn allow for a patient to be conscious yet cooperative enough to participate  
in ventilator weaning trials and early mobilization efforts. Delirium can only be assessed in patients who are able to sufficiently interact and communicate with bedside 
clinicians (ie RASS -3 to +4). IV. Procedure: A. Assessment: The following criteria must be assessed prior to the initiation of weaning from mechanical ventilation. Note: The criteria are general guidelines and must be individualized for each patient. The patients primary nurse will be responsible, in coordination with the RT, the Spontaneous Awakening Trial). The RT will perform the SBT. B. Spontaneous Awakening Trials (SATs  also referred to as Sedation Vacation) and Spontaneous Breathing Trials (SBTs) performed to determine readiness to wean. 1. For patients who meet established criteria, such as those without active seizures, alcohol withdrawal and agitation, myocardial ischemia or those requiring cardiac support devices, without increased intracranial pressure and those not receiving neuromuscular blockade, the nurse will reduce the infusions of sedative by 50% of current used for sedation that was used to achieve a level of light sedation (Reynoso Score 2 or RASS score of 0 to -1) and evaluate patient response to reduction of sedation. Analgesics required for pain control are continued during the test.  Obtain MD order to cover no SAT for that time period if patient has any exclusion criteria as described above.  
 
2. Failure of the spontaneous awakening trial occurs when the patient shows symptoms such as increased agitation, anxiety, pain or signs of respiratory distress including respiratory rate >35/min or oxygen saturation <88% as well as development of acute cardiac arrhythmias. If these symptoms develop during the SAT, the nurse then restarts sedation at 75% of the previous dose and titrates the medications until the patient is comfortable and/or symptoms have abated. 3.  If the patient passes the SAT then the patient moves on to the Spontaneous Breathing Trials as performed by the RT. The SBT Safety Screen included the following:  No agitation, oxygen saturation > 88%, FIO2 < 50%, PEEP < 7.5 cm H20, no myocardial ischemia, no vasopressor use, and with inspiratory efforts. 4. Patients who pass the spontaneous awakening trial but fail the spontaneous breathing trial are placed back on full ventilator support and reassessed the next day. 5. Failure of the SBT (spontaneous breathing trail) includes any of the following:  Respiratory rate > 35/min, respiratory rate < 8/min, oxygen saturation < 88%, respiratory distress, mental status change, acute cardiac arrhythmia. 6. Extubation is considered for patients who tolerate the spontaneous awakening trial and pass the spontaneous breathing trial.   
C. Can the cause of respiratory failure be reversed (i.e. absence of high spinal cord injury or advanced ALS)? D. Is gas exchange adequate? 1. PaO2/FIO2 ratio > 150  200, 2. PEEP < 8 cm H20 
3. FIO2 < 50 
4. pH > 7.30 
5. Rapid shallow breathing index (f/VT) < 105 
E. Is patient hemodynamically stable? 1. Absence of clinically significant hypotension (minimal vasopressors such as Dopamine < 5mcg/kg/minute)? F. Is there evidence of intact respiratory drive (NIP/NIF >-96 QHV48, stable VC02)? G. Does patient have an adequate cough, airway clearance ability? H. Is there absence of excessive secretions? V. Initiation: A.  The therapist shall consult with RN to determine if sedation can be discontinued or significantly decreased. If this can be achieved, the therapist shall implement the  
                  followin. Identify patient and verify name and account number via ID bracelet. 2. Perform hand hygiene per hospital policy utilizing Standard Precautions for all patients and following transmission-based isolation as indicated per hospital policy. 3. Perform a ONE-MINUTE SPONTANEOUS TRIAL AND ASSESSMENT. 4. Measure Rapid Shallow Breathing Index (RSBI) and monitor SpO2 and cardiovascular parameters during the spontaneous breathing assessment. 5. If SpO2 and cardiovascular parameters are stable, continue spontaneous breathing trial for at least 30 minutes and up to 120 minutes, as patient tolerates. 6. Monitor ventilatory status, SpO2, and cardiovascular status during spontaneous breathing trial. 
7. If patient has a successful trial, consider patient as a candidate for extubation and obtain order. 8. If patient fails the weaning trial, place back on ventilator and adjust settings to provide a non-fatiguing form of ventilatory support for the remainder of the day and night. 9. One attempt at weaning shall be performed each day until successful weaning occurs. The RCP will make every attempt to begin the spontaneous breathing trials between 0500 and 0600 to provide documentation of the trial when the pulmonologist makes rounds. B.  Assessment of SBT or PST: 
1. Is gas exchange acceptable? 2. PaO2 > 60 mm Hg. 3. PH > 7.30 
4. Increase in PaCO2  < 10 mm Hg C. Is patient hemodynamically stable? 1. HR < 120 beats/minute 2. HR  < 20% 3. Systolic BP < 432 and > 90 mmHg 4. BP  < 20%, no vasopressors required D. Does patient have stable ventilatory pattern? 1. Sustained RR < 30 breaths per minute 2. Normal and stable VCO2 3. Patient is not demonstrating any signs of increased work of breathing, such as increased use of accessory muscles. E. Mental status stable throughout trial? 
1. Absence of changes such as somnolence, excessive agitation or anxiety 2. Absence of diaphoresis during trial? 
IV. Safety: A. The RCP shall monitor patient according to the above guidelines. If at any time during the weaning process, the respiratory therapist or nurse feels that the patient is not tolerating weaning, the therapist shall place patient back on previous ventilator settings. B. The patient shall be reassessed and the weaning process should be continued the following day. V. Reportable Conditions: A. The therapist shall notify the physician, as appropriate, for any of the following         conditions: 1. FIO2 increase (sustained) at 10% or greater 2. Poor patient/ventilator interface in spite of adjustments 3. Need for increased sedation for respiratory distress 4. Need for increasing ventilating pressures (i.e. PEEP, PIP, MAP) 5. ABG results meeting panic value criteria or other clinical signs indicating deterioration of patients condition. 6. Unplanned extubation. 7. Unexplained sustained increase in PIP greater than 10 cm H2O. 
8. Assessment results regarding ventilator discontinuance process. VI. Ventilator protocol management A. The following items should be maintained for patients who are being mechanically           ventilated. 1. Obtain STAT Chest X-Ray for ET tube placement after insertion. 2. Chest X-Ray q a.m. while on ventilator. 3. ABGs 30 - 60 minutes after being stable on the ventilator. 4. ABG's q a.m. while on ventilator and prn. 
5. Do spontaneous breathing trials when patient is hemodynamically stable, responsive, and without fever. 6. Terminate trials if patient exhibits signs of respiratory distress. 7. Therapists should maintain ABG s as follows: 
    a. pH -  7.30 - 7.50              
    b. PaO2 -   60  100  
     8.     Racemic Epinephrine (0.5cc) for post extubation stridor (2 UA treatments max.) 
 
VII. Early Mobilization Mobility Level Criteria Start at Level 1 if:  
PaO2/FIO2 <250 Positive end-expiratory Pressure (PEEP) >=10 cm H2O  
O2 saturation <90% Respiratory Rate (RR) Not within 10-30 per min Cardiac arrhythmias or ischemia New onset Heart Rate  (HR) <60 or >120 beats per min Mean arterial pressure (MAP) <55 or >140 mmHg Systolic blood pressure (SBP) <90 or > 180 mmHg Vasopressor infusion New or increasing Dhillon Agitation Scale (RASS) < - 3 Level I:   Breathe  (Rass -5 to -3) HOB Angle  improve VAP protocol compliance ? Visually confirm the [facility] is elevated >= 30 degrees to comply with VAP prevention protocols ? The Centers for Disease Control and Prevention recommends an HOB angle of 30-45 degrees , unless contraindicated Additional activities to be implemented ? Every 2 hour turning ? Passive range of motion ? Up to 20 degrees reverse trendelenburg with lower extremity exercise/retracting footboard ? Continuous lateral rotation therapy can be considered part of early mobility therapy in patients who are at high risk for pulmonary complications Move to Level 2 when the patient 
- Has acceptable oxygenation/hemodynamics - Tolerates q 2 turning - Tolerates HOB > 30 degrees or up to 20 degrees reverse trendelenburg Level 2 :Tilt  Patient Assessment Rass > -3  (eg, opens eyes, may have profound weakness) Up to 20 degrees Reverse Trendelenburg position and 10 degrees minimum HOB 
- Reverse Trendelenburg positioning allows for orthostatic position in fragile patients - If available , use in conjunction with retracting foot section to allow for partial weight bearing prior to sitting up in the bed or getting out of bed Additional activities to be implemented -  Maintain HOB >/= 30 degrees - Q 2 hour turning - Passive/active range of motion - Legs dependent - PT consultation Move to Level 3 when the patient . Gene Cantu -Tolerates active- assistance exercises 2 times per day 
  -Tolerates lower extremity exercises against footboard/Up to 20 degrees Reverse Trendelenburg 
  -Tolerates legs dependent /HOB 45 degrees Level 3 :  SIT  (Rass >- 1 (eg , weak but may move arms/legs independently) Full chair position (footboard on) ? Full upright positioning allows for diaphragmatic excursion and lung expansion ? Sitting with legs in a dependent position facilitates gas exchange Additional activities to be implemented - Maintain HOB >= 30 degrees - Q 2 hour turning (assisted) - Active range of motion  PT/OT actively involved - Encourage activities of daily living 
- Dangling, if patient can move arm against gravity Move to Level 4 when the patient . Annalise Simpler - Tolerates increasing active exercise in bed - Actively assists with every- 2- hour turning or turns independently - Tolerates full chair position 3 times/day Level 4:  Stand ( RASS >0 (eg, weak but may tolerate increased activity) Stand Attempts ? Full chair position (footboard off/feet on the floor) ? Consider using a sit-to-stand lift ? Pivot to chair, it tolerates partial weight bearing Additional activities to be implemented - Maintain head of bed >= 30 degrees - Q 2 hr turning (self/assisted) - Active range of motion - Encourage activities of daily living 
- PT/OT actively involved Move to Level 5 when the patient . 
- Can successfully comply with all activities - Tolerates trial periods of full chair position (footboard off/feet on floor) 3 times per day - Tolerates partial weight-bearing stand and pivots to chair Level 5 :  Move  (RASS > 0    (eg, weak but may tolerate increased activity) Achieve out of bed ? Utilize mobile floor life to ambulate to bedside chair Additional activities to be implemented - Maintain HOB > = 30 degrees - Q 2 hour turning (self/assisted) - Active range of motion - Patient stands/bears weight > 1 minute - Patient marches in place 
- PT/OT actively involved Patient continues to ambulate progressively longer distances as tolerated until they consistently participate and move independently. E Approved by Critical Care Committee 2-19-09 [facility] Clinical Guidelines ABCDE DOC Flowsheet Content Variables to select when addressing section Comments ABCDE Initiated ? Yes/No  RN to address minimum q 24 hours (day shift) Target RASS ? 0 = alert and oriented ? -1 = drowsy ? -2 = light sedation ? -3= moderate sedation ? -4= deep sedation Target on standard ventilator setting should be -2; -4 with oscillator CAM -ICU ? Positive ? Negative ? Unable to assess Delirium assessment SAT Safety Screen Passed ? Yes 
? No Select yes if proceeding on to the sedation vacation (reduction of continuous sedative drip by directed by MD) Select no if your patient has any of the below reasons for not proceeding on to the daily awakening sedation vacation trial  
SAT Screen for Failure ? Active seizures ? Acute delirium tremors ? Agitation that threatens accidental line/tube removal 
? On paralytics ? MI (24-48hr) ? Abnormal ICP ? Open abdomen Select one of the options when the patient will not undergo the sedation vacation  ALSO MUST OBTAIN AN ORDER FOR [place] written under nursing miscellaneous for now by either the NP or Intensivist  
Daily sedation Vacation/assessment of  ? Yes 
? No 
? Not applicable If yes, MUST see the reduction in sedation on the [facility] and please place in the comment section of the sedative sedation vacation started SBT Safety Screen Passed ? Yes 
? No Select Yes if the patient has none of the below listed reasons for not proceeding on to the SBT following reduction of sedation SBT Screen Reason for Failure ? Agitation ? O2 Sat < or = 88% 
? FIO2 > 50% ? PEEP >7 
? MI 
?  Vasopressor Use 
 ? Bilevel setting on Vent ? Oscillator in use ? Increased resp effort Select reason as appropriate for NOT proceeding on to the SBT Wake Up and Breathe Protocol

## 2020-12-11 NOTE — PROGRESS NOTES
Christina Mode Admission Date: 12/2/2020 Daily Progress Note: 12/11/2020 The patient's chart is reviewed and the patient is discussed with the staff. 78 yo WM who presented with acute hypoxemia respiratory failure secondary to COVID-19. The patient arrived via EMS from home with reports of acute onset of dyspnea. He was recently in the ED on 11/27/2020 secondary to gait instability and subsequent fall. He is currently being worked up for [place] [id number]. Upon arrival to the ED, the patient's SPO2 levels were in the 50's on RA. He was placed on oxygen therapy with escalation to BiPAP. Reports no known COVID exposure. The patient has minimal medical history because he does not routinely see a PCP and only had has first office appointment on 10/8/2020 with Dr. Tressa Lopez due to neurological changes. He is a previous smoker but quit in 2007. The patient's son is the primary historian. The family wasn't aware that the patient could have COVID. The son suggested that the patient was having some dyspnea x 1 week. Also suggested he has been bedridden for nearly a week due to neurological changes. The rapid COVID-19 test was completed in the ED and was positive. Lactic acid level is 1.3, procal 0.28, WBC 7.3  
Intubated 12/8/20 Bilateral 28F chest tubes 12/8/20 S/p 5 days remdesivir, last dose 12/7. S/p convalescent plasma 12/3. Subjective:  
 
Patient remains intubated. Day 4 on vent. Currently on 60% Fio2 and satting well. PEEP 10. Sedated with propofol and fentanyl. B chest tubes still in place. Air leak on left but not on right currently. Getting tube feeds with residuals in the 300's. Already on reglan. Duskiness spreading to additional fingers on the right hand. Current Facility-Administered Medications Medication Dose Route Frequency  docusate sodium (COLACE) capsule 100 mg  100 mg Oral DAILY  argatroban 50 mg in 0.9% sodium chloride 50 mL (1000 mcg/mL) infusion  0.5-10 mcg/kg/min IntraVENous TITRATE  famotidine (PF) (PEPCID) 20 mg in 0.9% sodium chloride 10 mL injection  20 mg IntraVENous Q24H  
 metoclopramide HCl (REGLAN) injection 10 mg  10 mg IntraVENous Q6H  
 NUTRITIONAL SUPPORT ELECTROLYTE PRN ORDERS   Does Not Apply PRN  
 fentaNYL in normal saline (pf) 25 mcg/mL infusion  0-200 mcg/hr IntraVENous TITRATE  propofol (DIPRIVAN) 10 mg/mL infusion  0-50 mcg/kg/min IntraVENous TITRATE  acetaminophen (TYLENOL) tablet 650 mg  650 mg Per NG tube Q4H PRN  
 insulin lispro (HUMALOG) injection   SubCUTAneous Q6H  
 dexamethasone (DECADRON) 4 mg/mL injection 6 mg  6 mg IntraVENous DAILY  morphine 10 mg/ml injection 5 mg  5 mg IntraVENous Q4H PRN  
 influenza vaccine 2020-21 (6 mos+)(PF) (FLUARIX/FLULAVAL/FLUZONE QUAD) injection 0.5 mL  0.5 mL IntraMUSCular PRIOR TO DISCHARGE  dexmedeTOMidine (PRECEDEX) 400 mcg in 0.9% sodium chloride 100 mL infusion  0.1-1.5 mcg/kg/hr IntraVENous TITRATE  sodium chloride (NS) flush 5-40 mL  5-40 mL IntraVENous Q8H  
 sodium chloride (NS) flush 5-40 mL  5-40 mL IntraVENous PRN  
 0.9% sodium chloride infusion 250 mL  250 mL IntraVENous PRN Review of Systems Unobtainable due to patient status. Objective:  
 
Vitals:  
 12/11/20 0440 12/11/20 0630 12/11/20 0645 12/11/20 9375 BP: (!) 151/70 (!) 152/74 (!) 160/77 Pulse: (!) 113 (!) 115 (!) 115 (!) 113 Resp: 29 27 29 29 Temp:  98.9 °F (37.2 °C) SpO2: 94% 94% 93% 94% Weight:      
Height:      
 
Intake and Output:  
12/09 1901 - 12/11 0700 In: 5560.2 [I.V.:2075.2] Out: 2240 [Urine:2070] No intake/output data recorded. Physical Exam:  
Constitution:  the patient is ill appearing. EENMT:  Sclera clear, pupils equal, oral mucosa moist 
Respiratory: Audible sub q air diffusely. B chest tubes. Vigorous L air leak, no R air leak.   
Cardiovascular:  RRR without M,G,R 
 Gastrointestinal: soft and non-tender; with positive bowel sounds. Musculoskeletal: warm without cyanosis. There is no lower leg edema. Skin:  no jaundice or rashes, no wounds. R middle 3 fingers and 4th toe on left foot with duskiness. Neurologic: sedated Psychiatric:  sedated CHEST XRAY:  
CXR Results  (Last 48 hours)  
          
 12/11/20 0558  XR CHEST PORT Final result Impression:  IMPRESSION: No interval change. Narrative:  EXAM: Chest x-ray. INDICATION: Dyspnea. Covid 19. COMPARISON: Yesterday's chest x-ray. TECHNIQUE: Frontal view chest x-ray. FINDINGS: Bilateral lung infiltrates, pneumomediastinum, a small right-sided  
pleural effusion and extensive bilateral chest wall subcutaneous emphysema are  
unchanged. The cardiac size is within normal limits. The endotracheal tube,  
enteric tube, left arm PICC line and bilateral chest tubes remain in place. 12/10/20 0548  XR CHEST PORT Final result Impression:  IMPRESSION: No interval change. Narrative:  EXAM: Chest x-ray. INDICATION: Dyspnea. Covid 19. COMPARISON: Yesterday's chest x-ray. TECHNIQUE: Frontal view chest x-ray. FINDINGS: Bilateral lung infiltrates, pneumomediastinum, a small right apical  
pneumothorax and extensive bilateral chest wall subcutaneous emphysema are  
unchanged. The cardiac size remains within normal limits. The endotracheal tube,  
enteric tube and bilateral chest tubes remain in place. 12/11 LAB No lab exists for component: [place] Recent Labs 12/11/20 0447 12/10/20 
1045 12/10/20 
0350 12/09/20 
0344 WBC 18.1*  --  17.8* 12.3* HGB 10.5*  --  11.0* 10.9* HCT 34.1*  --  33.4* 29.9*  
*  --  119* 97* INR  --  1.5  --   --   
 
Recent Labs 12/11/20 
0448 12/10/20 
1046 12/10/20 
1045 12/10/20 
0350 12/09/20 
0344 *  --  150* 152* 155*  
K 5.6*  --  5.3* 5.1 5.1 *  --  123* 123* 125* CO2 20*  --  20* 20* 23 *  --  156* 178* 133* *  --  120* 110* 88* CREA 3.99*  --  3.70* 3.64* 3.06* MG 3.8*  --   --   --  3.7*  
CA 8.4  --  8.4 8.3 8.2* PHOS 6.4*  --  6.4*  --  6.4* ALB 1.6* 1.6* 1.6*  --   --   
 
ABG:   
Lab Results Component Value Date/Time PHI 7.24 (LL) 12/11/2020 04:13 AM  
 PCO2I 43.7 12/11/2020 04:13 AM  
 PO2I 80 12/11/2020 04:13 AM  
 HCO3I 18.5 (L) 12/11/2020 04:13 AM  
 FIO2I 60 12/11/2020 04:13 AM  
 
 
MICRO 
 
SARS-CoV-2 LAB Results [facility] Test: No results found for: COV2NT  
[facility] Test: No results found for: [facility] Test: No components found for: [id number] Blood - negative Assessment:  (Medical Decision Making) Hospital Problems  Date Reviewed: 10/9/2020 Codes Class Noted POA CAR[facility]acute kidney [facility]) [facility] ICD-10-CM: N17.9 ICD-9-CM: 584.9  12/10/2020 Unknown Thrombocytopenia ([facility] ICD-10-CM: D69.6 ICD-9-CM: 287.5  12/10/2020 Unknown Pneumothorax on left ICD-10-CM: J93.9 ICD-9-CM: 512.89  12/7/2020 Unknown Pneumothorax on right ICD-10-CM: J93.9 ICD-9-CM: 512.89  12/7/2020 Unknown Hypernatremia ICD-10-CM: E87.0 ICD-9-CM: 276.0  12/6/2020 Unknown Delirium ICD-10-CM: R41.0 ICD-9-CM: 780.09  12/5/2020 Yes Pneumomediastinum ([facility] ICD-10-CM: J98.2 ICD-9-CM: 518.1  12/5/2020 No  
   
 Subcutaneous emphysema (HCC) ICD-10-CM: T79. 7XXA ICD-9-CM: 958.7  12/5/2020 No  
   
 Acute respiratory failure with hypoxia [facility]) ICD-10-CM: J96.01 
ICD-9-CM: 518.81  12/2/2020 Yes COVID-19 ICD-10-CM: U07.1 ICD-9-CM: 079.89  12/2/2020 Yes * (Principal) Acute hypoxemic respiratory failure due to COVID-19 [facility]) ICD-10-CM: U07.1, J96.01 
ICD-9-CM: 518.81, 079.89, 799.02  12/2/2020 Yes Acute respiratory distress syndrome (ARDS) due to COVID-19 virus [facility]) ICD-10-CM: U07.1, J80 
ICD-9-CM: 518.82, 079.89  12/2/2020 Yes Patient with severe acute hypoxic respiratory failure, intubated, with resultant B ptx s/p B chest tubes. Severe sub q air despite this intervention. Hypernatremic, somewhat improved with free water. Now with worsening renal failure. Plts have dropped to around 50% of starting point and now having duskiness of digits. Plan:  (Medical Decision Making) -HD catheter per nephrology for worsening renal failure.  
-daily cxr's to monitor PTX requiring B chest tubes. -wean FiO2 as sats tolerate. Not yet requiring paralytics and proning but close to it.  
-trying to minimize inspiratory pressures on vent with B Ptxs 
-continue free water for now.  
-completed courses of remdesivir and convalescent plasma already.  
-continue dexamethasone with last dose today.  
-completed course of rocephin/azithro 
-pepcid 
-on argatroban. F/u HIT panel. Spouse China Hua [phone] - informed by her son Alden Beltre that she is now hospitalized here as well. Critically ill patient with complicated medical issues and high risk of further decompensation. Total critical care time spent thus far (exclusive of procedures): 36 minutes Jarrod Toledo MD

## 2020-12-11 NOTE — PROCEDURES
Procedure:  
 
US GUIDED 20 cm [place] temporary dialysis catheter placement. Indication:  
 
Acute oliguric renal failure Chlorohexidine Skin Antiseptic: Yes Hand Hygiene: Yes 
Maximal Barrier Precautions: Yes Optimal Catheter Site Selection: Yes 
Sterile Ultrasound Technique: Yes 
 
Summary: 
 
Informed consent was obtained from the patient's family. Using the [employer] I-LOOK 25 ultrasound with the 5-10 MHz vascular probe transducer and sterile seldinger technique. Attempts to locate the IJ's were made first. However, given the patient's severe subq air, no vessels in the region of the neck/chest could be seen at all. At this point, the decision was made to use the less optimal R femoral. The right femoral vein was identified and under direct real time visualization was cannulated. The dialysis catheter kit guide wire was then inserted and its position within the vein verified in long axis views on vascular probe US. The dialysis catheter was then inserted over the guide wire without complication after adequate dilation of the insertion site. There was no significant bleeding during the procedure and good flush and drawback was noted. The catheter was then affixed with supplied sutures via the proximal limiter with the insertion point affixed at the hub. There were no immediate complications.  
 
Melinda Cedeno MD

## 2020-12-11 NOTE — PROGRESS NOTES
Ventilator check complete; patient has a #8. 0 ET tube secured at the 26 at the lip. Patient is sedated. Patient is not able to follow commands. Breath sounds are diminished. Trachea is midline, Positive for subcutaneous air, and chest excursion is symmetric. Patient is also Negative for cyanosis. All alarms are set and audible. Resuscitation bag is at the head of the bed. Ventilator Settings Mode FIO2 Rate Tidal Volume Pressure PEEP I:E Ratio Pressure control  60 %          10 cm H20  1:1.2 Peak airway pressure: 31 cm H2O Minute ventilation: 16.4 l/min ABG: No results for input(s): PH, PCO2, PO2, HCO3 in the last 72 hours.  
 
 
Roque Bradley, RT

## 2020-12-11 NOTE — PROGRESS NOTES
[facility] Nephrology Subjective: CAREN   Intubated, sedated. Review of Systems - Can not be obtained. Objective: 
 
Vitals:  
 12/11/20 3978 12/11/20 0630 12/11/20 0645 12/11/20 0185 BP: (!) 151/70 (!) 152/74 (!) 160/77 Pulse: (!) 113 (!) 115 (!) 115 (!) 113 Resp: 29 27 29 29 Temp:  98.9 °F (37.2 °C) SpO2: 94% 94% 93% 94% Weight:      
Height:      
 
 
PE 
Gen: intubated CV:reg rate Chest:bilat breath sounds. Abd: soft Ext/Access: no edema Tommas Baptise LAB Recent Labs 12/11/20 0447 12/10/20 
1045 12/10/20 
0350 12/09/20 
0344 WBC 18.1*  --  17.8* 12.3* HGB 10.5*  --  11.0* 10.9* HCT 34.1*  --  33.4* 29.9*  
*  --  119* 97* INR  --  1.5  --   --   
 
Recent Labs 12/11/20 
0448 12/10/20 
1046 12/10/20 
1045 12/10/20 
0350 12/09/20 
0344 *  --  150* 152* 155*  
K 5.6*  --  5.3* 5.1 5.1 *  --  123* 123* 125* CO2 20*  --  20* 20* 23 *  --  156* 178* 133* *  --  120* 110* 88* CREA 3.99*  --  3.70* 3.64* 3.06* MG 3.8*  --   --   --  3.7* PHOS 6.4*  --  6.4*  --  6.4*  
CA 8.4  --  8.4 8.3 8.2* ALB 1.6* 1.6* 1.6*  --   --   
TBILI  --  0.6  --   --   --   
ALT  --  19  --   --   --   
 
 
 
 
Radiology A/P:  
Patient Active Problem List  
Diagnosis Code  Acute respiratory failure with hypoxia (HCC) J96.01  
 COVID-19 U07.1  Acute hypoxemic respiratory failure due to COVID-19 (HCC) U07.1, J96.01  
 Acute respiratory distress syndrome (ARDS) due to COVID-19 virus (HCC) U07.1, J80  Delirium R41.0  Pneumomediastinum ([address] J98.2  Subcutaneous emphysema (HCC) T79. 7XXA  Hypernatremia E87.0  Pneumothorax on left J93.9  Pneumothorax on right J93.9  CAREN (acute kidney injury) ([address] N17.9  Thrombocytopenia ([address] D69.6 CAREN - labs consistent with ATN and COVID kidney   Good urine volume, labs are climbing. Will go ahead and start SLED later today. Discussed with Dr Merle Tellez. Resp Failure - on vent COVID 19 - s/p remdiesivir and decadrom AMS Nneka Newman MD

## 2020-12-12 NOTE — PROGRESS NOTES
Rigo Kim Admission Date: 12/2/2020 Daily Progress Note: 12/12/2020 The patient's chart is reviewed and the patient is discussed with the staff. 80 yo WM who presented with acute hypoxemia respiratory failure secondary to COVID-19. The patient arrived via EMS from home with reports of acute onset of dyspnea. He was recently in the ED on 11/27/2020 secondary to gait instability and subsequent fall. He is currently being worked up for [employer]. Upon arrival to the ED, the patient's SPO2 levels were in the 50's on RA. He was placed on oxygen therapy with escalation to BiPAP. Reports no known COVID exposure. The patient has minimal medical history because he does not routinely see a PCP and only had has first office appointment on 10/8/2020 with Dr. Yobani Bermeo due to neurological changes. He is a previous smoker but quit in 2007. The patient's son is the primary historian. The family wasn't aware that the patient could have COVID. The son suggested that the patient was having some dyspnea x 1 week. Also suggested he has been bedridden for nearly a week due to neurological changes. The rapid COVID-19 test was completed in the ED and was positive. Lactic acid level is 1.3, procal 0.28, WBC 7.3  
Intubated 12/8/20 Bilateral 28F chest tubes 12/8/20 S/p 5 days remdesivir, last dose 12/7. S/p convalescent plasma 12/3. Subjective:  
 
Patient remains intubated. Day 5 on vent. Had HD catheter placed yesterday and had 8 hours SLED. Plts relatively stable. HIT panel still pending. Sedated with propofol and fentanyl. B chest tubes still in place. Air leak on left but not on right currently. Getting tube feeds. Duskiness spreading to additional fingers on the right hand. Current Facility-Administered Medications Medication Dose Route Frequency  docusate (COLACE) 50 mg/5 mL oral liquid 100 mg  100 mg Per NG tube DAILY  polyethylene glycol (MIRALAX) packet 17 g  17 g Per NG tube DAILY  argatroban 50 mg in 0.9% sodium chloride 50 mL (1000 mcg/mL) infusion  0.5-10 mcg/kg/min IntraVENous TITRATE  famotidine (PF) (PEPCID) 20 mg in 0.9% sodium chloride 10 mL injection  20 mg IntraVENous Q24H  
 metoclopramide HCl (REGLAN) injection 10 mg  10 mg IntraVENous Q6H  
 NUTRITIONAL SUPPORT ELECTROLYTE PRN ORDERS   Does Not Apply PRN  
 fentaNYL in normal saline (pf) 25 mcg/mL infusion  0-200 mcg/hr IntraVENous TITRATE  propofol (DIPRIVAN) 10 mg/mL infusion  0-50 mcg/kg/min IntraVENous TITRATE  acetaminophen (TYLENOL) tablet 650 mg  650 mg Per NG tube Q4H PRN  
 insulin lispro (HUMALOG) injection   SubCUTAneous Q6H  
 morphine 10 mg/ml injection 5 mg  5 mg IntraVENous Q4H PRN  
 influenza vaccine 2020-21 (6 mos+)(PF) (FLUARIX/FLULAVAL/FLUZONE QUAD) injection 0.5 mL  0.5 mL IntraMUSCular PRIOR TO DISCHARGE  dexmedeTOMidine (PRECEDEX) 400 mcg in 0.9% sodium chloride 100 mL infusion  0.1-1.5 mcg/kg/hr IntraVENous TITRATE  sodium chloride (NS) flush 5-40 mL  5-40 mL IntraVENous Q8H  
 sodium chloride (NS) flush 5-40 mL  5-40 mL IntraVENous PRN  
 0.9% sodium chloride infusion 250 mL  250 mL IntraVENous PRN Review of Systems Unobtainable due to patient status. Objective:  
 
Vitals:  
 12/12/20 0645 12/12/20 0700 12/12/20 0715 12/12/20 6001 BP: 117/64 (!) 114/59 (!) 110/59 116/62 Pulse: 99 100 100 100 Resp: 27 30 28 28 Temp:      
SpO2: 90% 90% 90% 90% Weight:      
Height:      
 
Intake and Output:  
12/10 1901 - 12/12 0700 In: 4778 [I.V.:442] Out: 5115 [[id number]] No intake/output data recorded. Physical Exam:  
Constitution:  the patient is ill appearing. EENMT:  Sclera clear, pupils equal, oral mucosa moist 
Respiratory: Audible sub q air diffusely. B chest tubes. Vigorous L air leak, no R air leak.   
Cardiovascular:  RRR without M,G,R 
 Gastrointestinal: soft and non-tender; with positive bowel sounds. Musculoskeletal: warm without cyanosis. There is no lower leg edema. Skin:  no jaundice or rashes, no wounds. R middle 3 fingers. Toes on left foot look less dusky today. Neurologic: sedated Psychiatric:  sedated CHEST XRAY:  
CXR Results  (Last 48 hours)  
          
 12/11/20 0558  XR CHEST PORT Final result Impression:  IMPRESSION: No interval change. Narrative:  EXAM: Chest x-ray. INDICATION: Dyspnea. Covid 19. COMPARISON: Yesterday's chest x-ray. TECHNIQUE: Frontal view chest x-ray. FINDINGS: Bilateral lung infiltrates, pneumomediastinum, a small right-sided  
pleural effusion and extensive bilateral chest wall subcutaneous emphysema are  
unchanged. The cardiac size is within normal limits. The endotracheal tube,  
enteric tube, left arm PICC line and bilateral chest tubes remain in place. 12/12 LAB No lab exists for component: [place] Recent Labs 12/12/20 
0408 12/11/20 
0447 12/10/20 
1045 12/10/20 
0350 WBC 15.6* 18.1*  --  17.8* HGB 9.9* 10.5*  --  11.0*  
HCT 30.9* 34.1*  --  33.4* PLT 97* 120*  --  119* INR  --   --  1.5  --   
 
Recent Labs 12/12/20 
0411 12/11/20 
0448 12/10/20 
1046 12/10/20 
1045  150*  --  150*  
K 4.9 5.6*  --  5.3*  
* 122*  --  123* CO2 24 20*  --  20* * 147*  --  156* BUN 85* 142*  --  120* CREA 2.45* 3.99*  --  3.70* MG  --  3.8*  --   --   
CA 8.0* 8.4  --  8.4 PHOS 4.6* 6.4*  --  6.4* ALB 1.6* 1.6* 1.6* 1.6* ABG:   
Lab Results Component Value Date/Time PHI 7.35 12/12/2020 03:44 AM  
 PCO2I 43.8 12/12/2020 03:44 AM  
 PO2I 59 (L) 12/12/2020 03:44 AM  
 HCO3I 24.4 12/12/2020 03:44 AM  
 FIO2I 60 12/12/2020 03:44 AM  
 
 
MICRO 
 
SARS-CoV-2 LAB Results [facility] Test: No results found for: COV2NT  
[facility] Test: No results found for: [facility] Test: No components found for: [id number] Blood - negative Assessment:  (Medical Decision Making) Hospital Problems  Date Reviewed: 10/9/2020 Codes Class Noted POA CAREN (acute kidney injury) ([facility] ICD-10-CM: N17.9 ICD-9-CM: 584.9  12/10/2020 Unknown Thrombocytopenia ([facility] ICD-10-CM: D69.6 ICD-9-CM: 287.5  12/10/2020 Unknown Pneumothorax on left ICD-10-CM: J93.9 ICD-9-CM: 512.89  12/7/2020 Unknown Pneumothorax on right ICD-10-CM: J93.9 ICD-9-CM: 512.89  12/7/2020 Unknown Hypernatremia ICD-10-CM: E87.0 ICD-9-CM: 276.0  12/6/2020 Unknown Delirium ICD-10-CM: R41.0 ICD-9-CM: 780.09  12/5/2020 Yes Pneumomediastinum ([facility] ICD-10-CM: J98.2 ICD-9-CM: 518.1  12/5/2020 No  
   
 Subcutaneous emphysema (HCC) ICD-10-CM: T79. 7XXA ICD-9-CM: 958.7  12/5/2020 No  
   
 Acute respiratory failure with hypoxia [facility]) ICD-10-CM: J96.01 
ICD-9-CM: 518.81  12/2/2020 Yes COVID-19 ICD-10-CM: U07.1 ICD-9-CM: 079.89  12/2/2020 Yes * (Principal) Acute hypoxemic respiratory failure due to COVID-19 [facility]) ICD-10-CM: U07.1, J96.01 
ICD-9-CM: 518.81, 079.89, 799.02  12/2/2020 Yes Acute respiratory distress syndrome (ARDS) due to COVID-19 virus [facility]) ICD-10-CM: U07.1, J80 
ICD-9-CM: 518.82, 079.89  12/2/2020 Yes Patient with severe acute hypoxic respiratory failure, intubated, with resultant B ptx s/p B chest tubes. Severe sub q air despite this intervention. Hypernatremic, somewhat improved with free water. Now with worsening renal failure. S/p HD catheter 12/11 and initiation of SLED 12/11. Plts have dropped to around 50% of starting point and now having duskiness of digits. HIT panel sent, changed to argatroban. Plan:  (Medical Decision Making) -HD per nephrology for worsening renal failure.  
-daily cxr's to monitor PTX requiring B chest tubes. -With FiO2 needs climbing will start paralytics. Hold on proning with B chest tube sand high risk of becoming displaced. -using low tidal volume vent strategy. Started at 8cc/kg. Tolerating, drop to 7cc/kg which will be 540cc 
-trying to minimize inspiratory pressures on vent with B Ptxs 
-completed courses of dexa, remdesivir and convalescent plasma already.  
-completed course of rocephin/azithro 
-pepcid 
-on argatroban. F/u HIT panel. Spouse China Hua [phone] - informed by her son Alden Beltre that she is now hospitalized here as well. Critically ill patient with complicated medical issues and high risk of further decompensation. Total critical care time spent thus far (exclusive of procedures): 41 minutes Jarrod Toledo MD

## 2020-12-12 NOTE — PROGRESS NOTES
Bedside and Verbal shift change report given to Venita Britton RN (oncoming nurse) by Devyn Olvera RN (offgoing nurse). Report included the following information SBAR, Kardex, ED Summary, Procedure Summary, Intake/Output, MAR, Accordion, Recent Results, Med Rec Status and Cardiac Rhythm NSR.

## 2020-12-12 NOTE — PROGRESS NOTES
[place] Nephrology Subjective: CAREN   Intubated, sedated. Review of Systems - Can not be obtained. Objective: 
 
Vitals:  
 12/12/20 0800 12/12/20 0815 12/12/20 4410 12/12/20 0831 BP: 119/61 118/61  123/65 Pulse: (!) 104 (!) 104 (!) 105 (!) 105 Resp: (!) 32 30 (!) 33 29 Temp:      
SpO2: (!) 89% 90% 90% (!) 89% Weight:      
Height:      
 
 
PE 
Gen: intubated CV:reg rate Chest:bilat breath sounds. Abd: soft Ext/Access: rt femoral temp dialysis cath. .LAB Recent Labs 12/12/20 
0408 12/11/20 
0447 12/10/20 
1045 12/10/20 
0350 WBC 15.6* 18.1*  --  17.8* HGB 9.9* 10.5*  --  11.0*  
HCT 30.9* 34.1*  --  33.4* PLT 97* 120*  --  119* INR  --   --  1.5  --   
 
Recent Labs 12/12/20 
0411 12/11/20 
0448 12/10/20 
1046 12/10/20 
1045  150*  --  150*  
K 4.9 5.6*  --  5.3*  
* 122*  --  123* CO2 24 20*  --  20* * 147*  --  156* BUN 85* 142*  --  120* CREA 2.45* 3.99*  --  3.70* MG  --  3.8*  --   --   
PHOS 4.6* 6.4*  --  6.4*  
CA 8.0* 8.4  --  8.4 ALB 1.6* 1.6* 1.6* 1.6* TBILI  --   --  0.6  --   
ALT  --   --  19  --   
 
 
 
 
Radiology A/P:  
Patient Active Problem List  
Diagnosis Code  Acute respiratory failure with hypoxia (HCC) J96.01  
 COVID-19 U07.1  Acute hypoxemic respiratory failure due to COVID-19 (HCC) U07.1, J96.01  
 Acute respiratory distress syndrome (ARDS) due to COVID-19 virus (HCC) U07.1, J80  Delirium R41.0  Pneumomediastinum ([address] J98.2  Subcutaneous emphysema (HCC) T79. 7XXA  Hypernatremia E87.0  Pneumothorax on left J93.9  Pneumothorax on right J93.9  CAREN (acute kidney injury) ([address] N17.9  Thrombocytopenia ([address] D69.6 CAREN - Pt had his first SLED last PM   He is becoming more difficult to ventilate. Will go ahead and run him again today, for volume, to help with resp status    Pt seen on SLED at 11:21am.  On dialysis for clearance. Resp Failure - on vent COVID 19 - s/p remdiesivir and decadrom [facility] Darling Christensen MD

## 2020-12-12 NOTE — PROGRESS NOTES
12 HR SLED initiated using  Right femoral CVC. Aspirated and flushed both catheter ports without problem. Machine settings per MD order. 150  DFR  350 ml/hr UFR No heparin this treatment. Reported to primary nurse. Dialysis nurse available as needed. 12/12/20 1000 Patient Information Informed Consent Verified Yes Dialyzer/Set Up Inspection Dialyzer/Set Up Inspection F-6 Alarms Verified Yes Test Pass Yes  
pH 7.1 Machine Conductivity 14.1 Meter Conductivity 14.1 Reverse Osmosis Safety Checks Reverse Osmosis Machine Log Completed Yes Total Chlorine Test Negative Machine Initiation Machine Number K5 Procedure Start Time 1000 Unused Lines Clamped Yes Machine Temperature 95.4 °F (35.2 °C) Dialysis Initiation All Connections Secured Yes NS Bag  Yes Saline Line Double Clamped Yes Prime Given Air Foam Detector Engaged Yes Dialysate NA (mEq/L) 140 Dialysate K (mEq/L) 3 Dialysate CA (mEq/L) 2.5 Dialysate HCO3 (mEq/L) 35 Citrasate No  
During Dialysis Pulse (Heart Rate) (!) 107 /66 Transducer Checks Dry Saline Given (mL) 300 mL Heparin Bolus (units) 0 units Continuous Heparin Infusion (Units/hr) 0 Units/hr Blood Flow Rate (ml/min) 150 ml/min Dialysate Flow Rate (ml/hr) 300 ml/hr Arterial Access Pressure (mmHg) 80 Venous Return Pressure (mmHg) 40 Transmembrane Pressure (mmHg) 80 mmHg Hourly Chamber Checks Yes Ultrafiltration Rate (ml/hr) 350 ml/hr CRRT Dialysis Intake/Output ([facility]) CRRT Replacement Intake (mL) 300 mL Hemodialysis Access 12/11/20 Placement Date: 12/11/20   Special Properties: Antibiotic coated/infused  Access Location: Femoral, right [place] Being Utilized Yes Criteria for Appropriate Use Dialysis/apheresis Date Accessed  12/12/20 Access Time  1000 Date of Last Dressing Change 12/11/20 Dressing Type Disk with Chlorhexadine gluconate (CHG); Transparent Proximal Hub Color/Line Status Yellow; Flushed;Capped Distal Hub Color/Line Status Yellow; Flushed;Capped  
$$ Dialysis Charges  
$$ Method CRRT  
CRRT Dialysis Intake/Output Patient Location [address] [place] [address]

## 2020-12-12 NOTE — PROGRESS NOTES
Ventilator check complete; patient has a #8. 0 ET tube secured at the 26 at the lip. Patient is sedated. Patient is not able to follow commands. Breath sounds are coarse and diminished. Trachea is midline, Positive for subcutaneous air, and chest excursion is symmetric. Patient is also Negative for cyanosis and is Negative for pitting edema. All alarms are set and audible. Resuscitation bag is at the head of the bed. Ventilator Settings Mode FIO2 Rate Tidal Volume PEEP I:E Ratio VC+  61 %   28 bpm 620 ml  10 cm H20  1:1.2 Peak airway pressure: 31 cm H2O Minute ventilation: 20.6 l/min Mariza Arnold, RT

## 2020-12-12 NOTE — PROGRESS NOTES
Pt. Removed off of CRRT, Pt tolerated 8 hr sled well. Lines were washed back with 300 ml of normal saline, flushed and capped. Dialysis machine disinfected and heat cleaned

## 2020-12-13 NOTE — PROGRESS NOTES
Ventilator check complete; patient has a #8. 0 ET tube secured at the 26 at the lip. Patient is sedated. Patient is not able to follow commands. Breath sounds are coarse and diminished. Trachea is midline, Positive for subcutaneous air, and chest excursion is symmetric. Patient is also Negative for cyanosis and is Negative for pitting edema. All alarms are set and audible. Resuscitation bag is at the head of the bed. Ventilator Settings Mode FIO2 Rate Tidal Volume PEEP I:E Ratio VC+  99 %  28 bpm  540 ml  10 cm H20  1:1.2 Peak airway pressure: 31 cm H2O Minute ventilation: 15.5 l/min Babs Cope RT

## 2020-12-13 NOTE — PROGRESS NOTES
Dr. Kitchen Child updated on ABG results 1 hour post initiation of flolan. Telephone orders received to increase dose to 50 ng/kg/min and repeat ABG in 2 hours.

## 2020-12-13 NOTE — PROGRESS NOTES
Bedside and Verbal shift change report given to Braxton (oncoming nurse) by Aneesh Love (offgoing nurse). Report included the following information SBAR, Kardex, ED Summary, Procedure Summary, Intake/Output, MAR, Accordion, Recent Results, Med Rec Status and Cardiac Rhythm sinus tach.

## 2020-12-13 NOTE — PROGRESS NOTES
[facility] Nephrology Subjective: CAREN   Intubated, sedated. Review of Systems - Can not be obtained. Objective: 
 
Vitals:  
 12/13/20 0731 12/13/20 0745 12/13/20 0800 12/13/20 1746 BP: 105/60 (!) 104/55 (!) 104/57 Pulse: (!) 111 (!) 111 (!) 110 (!) 110 Resp: (!) 31 (!) 31 (!) 31 (!) 31 Temp:      
SpO2: 95% 95% 95% 95% Weight:      
Height:      
 
 
PE 
Gen: intubated CV:reg rate Chest:bilat breath sounds. Abd: soft Ext/Access: rt femoral temp dialysis cath. .LAB Recent Labs 12/13/20 
0342 12/12/20 
0408 12/11/20 
0447 12/10/20 
1045 WBC 17.3* 15.6* 18.1*  --   
HGB 10.6* 9.9* 10.5*  --   
HCT 33.6* 30.9* 34.1*  --   
PLT 84* 97* 120*  --   
INR  --   --   --  1.5 Recent Labs 12/13/20 
0343 12/12/20 
0411 12/11/20 
0448 12/10/20 
1046 12/10/20 
1045  144 150*  --  150*  
K 5.5* 4.9 5.6*  --  5.3*  
 111* 122*  --  123* CO2 29 24 20*  --  20* * 106* 147*  --  156* BUN 64* 85* 142*  --  120* CREA 2.73* 2.45* 3.99*  --  3.70* MG  --   --  3.8*  --   --   
PHOS  --  4.6* 6.4*  --  6.4*  
CA 8.3 8.0* 8.4  --  8.4 ALB  --  1.6* 1.6* 1.6* 1.6* TBILI  --   --   --  0.6  --   
ALT  --   --   --  19  --   
 
 
 
 
Radiology A/P:  
Patient Active Problem List  
Diagnosis Code  Acute respiratory failure with hypoxia ([facility]) J96.01  
 COVID-19 U07.1  Acute hypoxemic respiratory failure due to COVID-19 (HCC) U07.1, J96.01  
 Acute respiratory distress syndrome (ARDS) due to COVID-19 virus (HCC) U07.1, J80  Delirium R41.0  Pneumomediastinum ([address] J98.2  Subcutaneous emphysema (HCC) T79. 7XXA  Hypernatremia E87.0  Pneumothorax on left J93.9  Pneumothorax on right J93.9  CAREN (acute kidney injury) ([address] N17.9  Thrombocytopenia ([address] D69.6 CAREN - Will plan on SLED again tomorrow. Will not go for as much fluid as he is getting more tachycardic. Resp Failure - on vent COVID 19 - s/p remdiesivir and decadrom AMS Sharonda Douglas MD

## 2020-12-13 NOTE — PROGRESS NOTES
Tati Antoine Admission Date: 12/2/2020 Daily Progress Note: 12/13/2020 The patient's chart is reviewed and the patient is discussed with the staff. 80 yo WM who presented with acute hypoxemia respiratory failure secondary to COVID-19. The patient arrived via EMS from home with reports of acute onset of dyspnea. He was recently in the ED on 11/27/2020 secondary to gait instability and subsequent fall. He is currently being worked up for [employer]. Upon arrival to the ED, the patient's SPO2 levels were in the 50's on RA. He was placed on oxygen therapy with escalation to BiPAP. Reports no known COVID exposure. The patient has minimal medical history because he does not routinely see a PCP and only had has first office appointment on 10/8/2020 with Dr. Gagandeep Martin due to neurological changes. He is a previous smoker but quit in 2007. The patient's son is the primary historian. The family wasn't aware that the patient could have COVID. The son suggested that the patient was having some dyspnea x 1 week. Also suggested he has been bedridden for nearly a week due to neurological changes. The rapid COVID-19 test was completed in the ED and was positive. Lactic acid level is 1.3, procal 0.28, WBC 7.3  
Intubated 12/8/20 Bilateral 28F chest tubes 12/8/20 S/p 5 days remdesivir, last dose 12/7. S/p convalescent plasma 12/3. Subjective:  
 
Patient remains intubated. Day 6 on vent. Worsening hypoxia yesterday. Was started on paralytics. O2 needs worsened even on this and is now on 100% FiO2. CXR without acute change. Chest tubes still in place and air leak present on the left. HIT panel negative. Sedated with propofol and fentanyl. Duskiness seems better in fingers on right hand. Current Facility-Administered Medications Medication Dose Route Frequency  docusate (COLACE) 50 mg/5 mL oral liquid 100 mg  100 mg Per NG tube DAILY  white petrolatum-mineral oiL (AKWA TEARS) 83-15 % ophthalmic ointment   Both Eyes Q4H PRN  
 atracurium (TRACRIUM) 1,000 mg in 0.9% sodium chloride 250 mL infusion  0-15 mcg/kg/min IntraVENous TITRATE  polyethylene glycol (MIRALAX) packet 17 g  17 g Per NG tube DAILY  argatroban 50 mg in 0.9% sodium chloride 50 mL (1000 mcg/mL) infusion  0.5-10 mcg/kg/min IntraVENous TITRATE  famotidine (PF) (PEPCID) 20 mg in 0.9% sodium chloride 10 mL injection  20 mg IntraVENous Q24H  
 metoclopramide HCl (REGLAN) injection 10 mg  10 mg IntraVENous Q6H  
 NUTRITIONAL SUPPORT ELECTROLYTE PRN ORDERS   Does Not Apply PRN  
 fentaNYL in normal saline (pf) 25 mcg/mL infusion  0-200 mcg/hr IntraVENous TITRATE  propofol (DIPRIVAN) 10 mg/mL infusion  0-50 mcg/kg/min IntraVENous TITRATE  acetaminophen (TYLENOL) tablet 650 mg  650 mg Per NG tube Q4H PRN  
 insulin lispro (HUMALOG) injection   SubCUTAneous Q6H  
 morphine 10 mg/ml injection 5 mg  5 mg IntraVENous Q4H PRN  
 influenza vaccine 2020-21 (6 mos+)(PF) (FLUARIX/FLULAVAL/FLUZONE QUAD) injection 0.5 mL  0.5 mL IntraMUSCular PRIOR TO DISCHARGE  dexmedeTOMidine (PRECEDEX) 400 mcg in 0.9% sodium chloride 100 mL infusion  0.1-1.5 mcg/kg/hr IntraVENous TITRATE  sodium chloride (NS) flush 5-40 mL  5-40 mL IntraVENous Q8H  
 sodium chloride (NS) flush 5-40 mL  5-40 mL IntraVENous PRN  
 0.9% sodium chloride infusion 250 mL  250 mL IntraVENous PRN Review of Systems Unobtainable due to patient status. Objective:  
 
Vitals:  
 12/13/20 0731 12/13/20 0745 12/13/20 0800 12/13/20 3625 BP: 105/60 (!) 104/55 (!) 104/57 Pulse: (!) 111 (!) 111 (!) 110 (!) 110 Resp: (!) 31 (!) 31 (!) 31 (!) 31 Temp:      
SpO2: 95% 95% 95% 95% Weight:      
Height:      
 
Intake and Output:  
12/11 1901 - 12/13 0700 In: 3865.7 [I.V.:754.7] Out: 3341 [FJDVI:8003] No intake/output data recorded. Physical Exam:  
Constitution:  the patient is ill appearing. EENMT:  Sclera clear, pupils equal, oral mucosa moist 
Respiratory: Audible sub q air diffusely. B chest tubes. Intermittent L air leak, no R air leak. Cardiovascular:  RRR without M,G,R 
Gastrointestinal: soft and non-tender; with positive bowel sounds. Musculoskeletal: warm without cyanosis. There is no lower leg edema. Skin:  no jaundice or rashes, no wounds. R middle finger dusky. 4th toe on left foot mildly dusky. Neurologic: sedated Psychiatric:  sedated CHEST XRAY:  
CXR Results  (Last 48 hours)  
          
 12/13/20 0630  XR CHEST PORT Final result Impression:  IMPRESSION:  
   
1. Extensive subcutaneous emphysema. Tiny bilateral apical pneumothoraces. Stable chest tubes. 2. Bilateral airspace disease, similar to prior exam.  
  
 Narrative:  Portable chest xray COMPARISON: Yesterday CLINICAL HISTORY: Respiratory failure. FINDINGS:  
   
Extensive subcutaneous emphysema. There are small bilateral apical  
pneumothoraces. Bilateral chest tubes are stable. Endotracheal and enteric tubes  
are stable. There are bilateral diffuse airspace densities. Cardiac mediastinal  
contour and the surrounding bones are stable. Left-sided PICC is stable. 12/12/20 0447  XR CHEST PORT Final result Impression:  IMPRESSION:  
Left lung opacities may have improved in the interim. Otherwise, stable  
radiographic findings. Narrative:  EXAM: CHEST X-RAY, 1 VIEW INDICATION: respiratory failure. COMPARISON: Chest x-ray 12/11/2020 TECHNIQUE: Single AP view of the chest was obtained. FINDINGS: Bilateral thoracostomy tubes are in place. Mixed interstitial and  
airspace opacities are again demonstrated bilaterally, which may have improved  
in the left lung in the interim. No pneumothorax is appreciated. . The  
cardiomediastinal silhouette is within normal limits. . Extensive chest wall  
subcutaneous emphysema. 12/12/20 1425  XR CHEST SNGL V Final result Impression:  IMPRESSION:  
Stable intrathoracic findings. There is no clear interval worsening of a  
pneumothorax. Fluid may be a small right apical pneumothorax appears  
retrospectively stable. No pneumothorax clearly appreciated on the left. Narrative:  EXAM: CHEST X-RAY, 1 VIEW INDICATION: evaluate for worsening pneumo. COMPARISON: Chest x-ray from earlier today at 4:45 AM  
   
TECHNIQUE: Single AP view of the chest was obtained. FINDINGS: Bilateral thoracostomy tubes remain in place. There is no clear  
interval worsening of a pneumothorax. There may be a small right apical  
pneumothorax, likely retrospectively stable. Stable appearance of the  
mediastinum. Endotracheal tube terminates 8.3 cm above the eden. Stable mixed  
interstitial and airspace opacities bilaterally. Bones are unchanged. 12/13 LAB No lab exists for component: [place] Recent Labs 12/13/20 
0342 12/12/20 
0408 12/11/20 
0447 12/10/20 
1045 WBC 17.3* 15.6* 18.1*  --   
HGB 10.6* 9.9* 10.5*  --   
HCT 33.6* 30.9* 34.1*  --   
PLT 84* 97* 120*  --   
INR  --   --   --  1.5 Recent Labs 12/13/20 
0343 12/12/20 
0411 12/11/20 
0448 12/10/20 
1046 12/10/20 
1045  144 150*  --  150*  
K 5.5* 4.9 5.6*  --  5.3*  
 111* 122*  --  123* CO2 29 24 20*  --  20* * 106* 147*  --  156* BUN 64* 85* 142*  --  120* CREA 2.73* 2.45* 3.99*  --  3.70* MG  --   --  3.8*  --   --   
CA 8.3 8.0* 8.4  --  8.4 PHOS  --  4.6* 6.4*  --  6.4* ALB  --  1.6* 1.6* 1.6* 1.6* ABG:   
Lab Results Component Value Date/Time PHI 7.25 (L) 12/13/2020 03:37 AM  
 PCO2I 66.4 (HH) 12/13/2020 03:37 AM  
 PO2I 95 12/13/2020 03:37 AM  
 HCO3I 29.2 (H) 12/13/2020 03:37 AM  
 FIO2I 100 12/13/2020 03:37 AM  
 
 
MICRO 
 
SARS-CoV-2 LAB Results [facility] Test: No results found for: COV2NT  
[facility] Test: No results found for: EDPR 
 [facility] Test: No components found for: [id number] Blood - negative Assessment:  (Medical Decision Making) Hospital Problems  Date Reviewed: 10/9/2020 Codes Class Noted POA CAREN (acute kidney injury) ([facility] ICD-10-CM: N17.9 ICD-9-CM: 584.9  12/10/2020 Unknown Thrombocytopenia ([facility] ICD-10-CM: D69.6 ICD-9-CM: 287.5  12/10/2020 Unknown Pneumothorax on left ICD-10-CM: J93.9 ICD-9-CM: 512.89  12/7/2020 Unknown Pneumothorax on right ICD-10-CM: J93.9 ICD-9-CM: 512.89  12/7/2020 Unknown Hypernatremia ICD-10-CM: E87.0 ICD-9-CM: 276.0  12/6/2020 Unknown Delirium ICD-10-CM: R41.0 ICD-9-CM: 780.09  12/5/2020 Yes Pneumomediastinum ([facility] ICD-10-CM: J98.2 ICD-9-CM: 518.1  12/5/2020 No  
   
 Subcutaneous emphysema (HCC) ICD-10-CM: T79. 7XXA ICD-9-CM: 958.7  12/5/2020 No  
   
 Acute respiratory failure with hypoxia [facility]) ICD-10-CM: J96.01 
ICD-9-CM: 518.81  12/2/2020 Yes COVID-19 ICD-10-CM: U07.1 ICD-9-CM: 079.89  12/2/2020 Yes * (Principal) Acute hypoxemic respiratory failure due to COVID-19 [facility]) ICD-10-CM: U07.1, J96.01 
ICD-9-CM: 518.81, 079.89, 799.02  12/2/2020 Yes Acute respiratory distress syndrome (ARDS) due to COVID-19 virus [facility]) ICD-10-CM: U07.1, J80 
ICD-9-CM: 518.82, 079.89  12/2/2020 Yes Patient with severe acute hypoxic respiratory failure, intubated, with resultant B ptx s/p B chest tubes. Severe sub q air despite this intervention. Worsening O2 needs, up to 100% FiO2. Now with worsening renal failure. S/p HD catheter 12/11 and initiation of SLED 12/11. Plts have dropped to around 50% of starting point and now having duskiness of digits. HIT panel sent, changed to argatroban. HIT now negative. Plan:  (Medical Decision Making) -FiO2 100% on vent. Would be difficult to prone without risk of dislodging one of his B chest tubes. Try flolan next.  
-already on paralytics. Continue -check d dimer and then change argatroban to appropriate heparin regimen based on below protocol. -HD per nephrology for worsening renal failure.  
-daily cxr's to monitor PTX requiring B chest tubes. -using low tidal volume vent strategy. Started at 8cc/kg. Dropped to 7cc/kg - 540cc and ABG just acceptable. Continue for now.  
-trying to minimize inspiratory pressures on vent with B Ptxs 
-completed courses of dexa, remdesivir and convalescent plasma already.  
-completed course of rocephin/azithro 
-tube feeds 
-pepcid 
-heparin as above. Critically ill patient with complicated medical issues and high risk of further decompensation. Total critical care time spent thus far (exclusive of procedures): 37 minutes Floresita Abdi MD

## 2020-12-14 NOTE — PROGRESS NOTES
[facility] Subjective: CAREN   Intubated, sedated. Review of Systems - Can not be obtained. Objective: 
 
Vitals:  
 12/14/20 0715 12/14/20 0730 12/14/20 0746 12/14/20 0848 BP: (!) 89/53 (!) 91/50 (!) 100/59 Pulse: (!) 104 (!) 105 (!) 105 (!) 102 Resp: (!) 33 (!) 33 (!) 33 (!) 33 Temp: 98.6 °F (37 °C) SpO2: (!) 88% 90% 91% 90% Weight:      
Height:      
 
 
PE from Dr Isauro Hill Constitution:  the patient is ill appearing. EENMT:  Sclera clear, pupils equal, oral mucosa moist 
Respiratory: Audible sub q air diffusely. B chest tubes. Intermittent L air leak, no R air leak. Cardiovascular:  RRR without M,G,R 
Gastrointestinal: soft and non-tender; with positive bowel sounds. Musculoskeletal: warm without cyanosis. There is no lower leg edema. Skin:  no jaundice or rashes, no wounds. R middle finger dusky. 4th toe on left foot mildly dusky. Neurologic: sedated Psychiatric:  sedated [facility] Recent Labs 12/14/20 
3942 12/13/20 
0342 12/12/20 
0408 WBC 12.5* 17.3* 15.6* HGB 8.7* 10.6* 9.9*  
HCT 27.2* 33.6* 30.9*  
* 84* 97* Recent Labs 12/14/20 
1249 12/13/20 
0343 12/12/20 
0411 * 137 144 K 6.2* 5.5* 4.9  
CL 97* 103 111* CO2 23 29 24 * 118* 106* BUN 98* 64* 85* CREA 4.41* 2.73* 2.45* MG 3.1*  --   --   
PHOS >9.0*  --  4.6*  
CA 7.8* 8.3 8.0* ALB 1.3*  --  1.6* Radiology A/P:  
Patient Active Problem List  
Diagnosis Code  Acute respiratory failure with hypoxia ([facility]) J96.01  
 COVID-19 U07.1  Acute hypoxemic respiratory failure due to COVID-19 (HCC) U07.1, J96.01  
 Acute respiratory distress syndrome (ARDS) due to COVID-19 virus (HCC) U07.1, J80  Delirium R41.0  Pneumomediastinum ([address] J98.2  Subcutaneous emphysema (HCC) T79. 7XXA  Hypernatremia E87.0  Pneumothorax on left J93.9  Pneumothorax on right J93.9  CAREN (acute kidney injury) ([address] N17.9  Thrombocytopenia ([facility] D69.6 CAREN - SED today for primarily clearance. Resp Failure - on vent COVID 19 AMS Car Kenny MD

## 2020-12-14 NOTE — DIALYSIS
12 HR SLED initiated using  Right femoral catheter. Aspirated and flushed both catheter ports without problem. Machine settings per MD order. 150  DFR  100 UFR No Heparin Reported to primary nurse. Dialysis nurse available as needed.

## 2020-12-14 NOTE — PROGRESS NOTES
Bedside, Verbal and Written shift change report given to Gisele Shipman RN (oncoming nurse) by Ilya Castillo RN (offgoing nurse). Report included the following information SBAR, Kardex, ED Summary, Intake/Output, MAR, Med Rec Status and Cardiac Rhythm st.  
 
Fent, flolan and heparin signed off at this time.

## 2020-12-14 NOTE — PROGRESS NOTES
Updated pt son on pt current condition. Due to patient spouse being hospitalized for covid, son requests that we list him as first contact. Harper Mercedes: [phone]

## 2020-12-14 NOTE — PROGRESS NOTES
Pt with large bloody bm, occult blood sent. Penny consistency liquid, flexiseal placed and heparin gtt stopped per MD. After cleaning patient, peak pressures elevated and on vent, Osat 79-81%. MD informed and RT called to bedside.

## 2020-12-14 NOTE — PROGRESS NOTES
Chart reviewed and pt discussed in am IDR. Remains CCU/Covid positive isolation. Continues intubated/vent- 7 days, paralyzed, flolan, fentanyl, levo, and propofol gtts. SLED per Nephrology. CM continues to follow for any assist and d/c POC when stable. LOS 11 days.

## 2020-12-14 NOTE — PROGRESS NOTES
Critical Care Daily Progress Note: 12/14/2020 Admission Date: 12/2/2020 The patient's chart is reviewed and the patient is discussed with the staff. [address] yo WM who presented with acute hypoxemia respiratory failure secondary to COVID-19. The patient arrived via EMS from home with reports of acute onset of dyspnea. He was recently in the ED on 11/27/2020 secondary to gait instability and subsequent fall. He is currently being worked up for [employer]. Upon arrival to the ED, the patient's SPO2 levels were in the 50's on RA. He was placed on oxygen therapy with escalation to BiPAP. Reports no known COVID exposure. The patient has minimal medical history because he does not routinely see a PCP and only had has first office appointment on 10/8/2020 with Dr. Yobani Bermeo due to neurological changes. He is a previous smoker but quit in 2007. The patient's son is the primary historian. The family wasn't aware that the patient could have COVID. The son suggested that the patient was having some dyspnea x 1 week. Also suggested he has been bedridden for nearly a week due to neurological changes. The rapid COVID-19 test was completed in the ED and was positive. Lactic acid level is 1.3, procal 0.28, WBC 7.3  
Intubated 12/8/20 Bilateral 28F chest tubes 12/8/20  
S/p 5 days remdesivir, last dose 12/7. S/p convalescent plasma 12/3. Subjective:  
 
Sedated on vent On CRRT On pressors Had 2  Bloody BM today P/F ratio    139 yesterday >>>> 72 today Current Facility-Administered Medications Medication Dose Route Frequency  albumin human 25% (BUMINATE) solution 25 g  25 g IntraVENous Q6H  
 NOREPINephrine (LEVOPHED) 4 mg in 5% dextrose 250 mL infusion  0.5-30 mcg/min IntraVENous TITRATE  epoprostenol (FLOLAN) 30,000 ng/mL in diluent for epoprostenol (gly) 50 mL solution  10-50 ng/kg/min (Ideal) Nebulization TITRATE  
 0.9% Sodium Chloride for Flolan Infusion  0-8 mL/hr Nebulization TITRATE  heparin 25,000 units in dextrose 500 mL infusion  12-25 Units/kg/hr IntraVENous TITRATE  docusate (COLACE) 50 mg/5 mL oral liquid 100 mg  100 mg Per NG tube DAILY  white petrolatum-mineral oiL (AKWA TEARS) 83-15 % ophthalmic ointment   Both Eyes Q4H PRN  
 atracurium (TRACRIUM) 1,000 mg in 0.9% sodium chloride 250 mL infusion  0-15 mcg/kg/min IntraVENous TITRATE  polyethylene glycol (MIRALAX) packet 17 g  17 g Per NG tube DAILY  famotidine (PF) (PEPCID) 20 mg in 0.9% sodium chloride 10 mL injection  20 mg IntraVENous Q24H  
 metoclopramide HCl (REGLAN) injection 10 mg  10 mg IntraVENous Q6H  
 NUTRITIONAL SUPPORT ELECTROLYTE PRN ORDERS   Does Not Apply PRN  
 fentaNYL in normal saline (pf) 25 mcg/mL infusion  0-200 mcg/hr IntraVENous TITRATE  propofol (DIPRIVAN) 10 mg/mL infusion  0-50 mcg/kg/min IntraVENous TITRATE  acetaminophen (TYLENOL) tablet 650 mg  650 mg Per NG tube Q4H PRN  
 insulin lispro (HUMALOG) injection   SubCUTAneous Q6H  
 morphine 10 mg/ml injection 5 mg  5 mg IntraVENous Q4H PRN  
 influenza vaccine 2020-21 (6 mos+)(PF) (FLUARIX/FLULAVAL/FLUZONE QUAD) injection 0.5 mL  0.5 mL IntraMUSCular PRIOR TO DISCHARGE  dexmedeTOMidine (PRECEDEX) 400 mcg in 0.9% sodium chloride 100 mL infusion  0.1-1.5 mcg/kg/hr IntraVENous TITRATE  sodium chloride (NS) flush 5-40 mL  5-40 mL IntraVENous Q8H  
 sodium chloride (NS) flush 5-40 mL  5-40 mL IntraVENous PRN  
 0.9% sodium chloride infusion 250 mL  250 mL IntraVENous PRN Review of Systems Constitutional:  negative for fever, chills, sweats Cardiovascular:  negative for chest pain, palpitations, syncope, edema Gastrointestinal:  negative for dysphagia, reflux, vomiting, diarrhea, abdominal pain, or melena Neurologic:  negative for focal weakness, numbness, headache Objective:  
 
Vitals:  
 12/14/20 0946 12/14/20 1000 12/14/20 1006 12/14/20 1015 BP: (!) 90/48 (!) 91/53 (!) 91/53 (!) 91/50 Pulse: 99 99 (!) 101 100 Resp: (!) 35 (!) 35  (!) 35 Temp:      
SpO2: (!) 85% (!) 85%  (!) 84% Weight:      
Height:      
 
 
 
Intake/Output Summary (Last 24 hours) at 12/14/2020 1109 Last data filed at 12/14/2020 1006 Gross per 24 hour Intake 3696.07 ml Output 125 ml Net 3571.07 ml Physical Exam:         
Constitutional:  intubated and mechanically ventilated. EENMT:  Sclera clear, pupils equal, oral mucosa moist 
Respiratory: coarse ,lot of subcutaneous air Cardiovascular:  RRR with no M,G,R; 
Gastrointestinal:  soft with no tenderness; positive bowel sounds present Musculoskeletal:  warm with no cyanosis, plus 1  lower extremity edema Skin:  no jaundice or ecchymosis Neurologic: no gross neuro deficits Psychiatric:  Sedated, paralyzed LINES:   
ETT- ( 12/8-  
CT behzad ( 12/8-  
 L arm PICC 
 HD catheter R femoral  Vein Heparin gtt DRIPS:   
Levophed gtt- 16 Propofol gtt - 5  
- Fentanyl gtt-25  
- Flolan  
 -tractrium gtt CXR:   
 
 
Ventilator Settings Mode FIO2 Rate Tidal Volume Pressure PEEP  
VC+  99 %    480 ml(decreased volume due to high peak pressures)     11 cm H20 Peak airway pressure: 48 cm H2O Minute ventilation: 17.1 l/min ABG:  
Recent Labs 12/14/20 
0219 12/13/20 
1507 12/13/20 
1223 PHI 7.21* 7.26* 7.25* PCO2I 56.3* 56.9* 58.7* PO2I 72* 139* 104* HCO3I 22.4 25.2 25.5 LAB Recent Labs 12/14/20 
0529 12/13/20 
2346 12/13/20 
1708 12/13/20 
1215 12/13/20 
0522 GLUCPOC 111* 125* 159* 156* 143* Recent Labs 12/14/20 
6074 12/13/20 
0342 12/12/20 
0408 WBC 12.5* 17.3* 15.6* HGB 8.7* 10.6* 9.9*  
HCT 27.2* 33.6* 30.9*  
* 84* 97* Recent Labs 12/14/20 
7607 12/13/20 
0343 12/12/20 
0411 * 137 144 K 6.2* 5.5* 4.9  
CL 97* 103 111* CO2 23 29 24 * 118* 106* BUN 98* 64* 85* CREA 4.41* 2.73* 2.45* MG 3.1*  --   --   
PHOS >9.0*  --  4.6*  
CA 7.8* 8.3 8.0*  
 ALB 1.3*  --  1.6* No results for input(s): LCAD, LAC in the last 72 hours. Patient Active Problem List  
Diagnosis Code  Acute respiratory failure with hypoxia (HCC) J96.01  
 COVID-19 U07.1  Acute hypoxemic respiratory failure due to COVID-19 (HCC) U07.1, J96.01  
 Acute respiratory distress syndrome (ARDS) due to COVID-19 virus (HCC) U07.1, J80  Delirium R41.0  Pneumomediastinum ([facility] J98.2  Subcutaneous emphysema ([facility]) T79. 7XXA  Hypernatremia E87.0  Pneumothorax on left J93.9  Pneumothorax on right J93.9  CAREN (acute kidney injury) ([facility] N17.9  Thrombocytopenia ([facility] D69.6 Patient with severe acute hypoxic respiratory failure, intubated, with resultant B ptx s/p B chest tubes. Severe sub q air despite this intervention. Worsening O2 needs, up to 100% FiO2. Now with worsening renal failure. S/p HD catheter 12/11 and initiation of SLED 12/11. Plts have dropped to around 50% of starting point and now having duskiness of digits. HIT panel sent, changed to argatroban. HIT now negative. Assessment:  (Medical Decision Making) Hospital Problems  Date Reviewed: 10/9/2020 Codes Class Noted POA CAREN (acute kidney injury) ([facility] ICD-10-CM: N17.9 ICD-9-CM: 584.9  12/10/2020 Unknown Thrombocytopenia ([facility] ICD-10-CM: D69.6 ICD-9-CM: 287.5  12/10/2020 Unknown Pneumothorax on left ICD-10-CM: J93.9 ICD-9-CM: 512.89  12/7/2020 Unknown Pneumothorax on right ICD-10-CM: J93.9 ICD-9-CM: 512.89  12/7/2020 Unknown Hypernatremia ICD-10-CM: E87.0 ICD-9-CM: 276.0  12/6/2020 Unknown Delirium ICD-10-CM: R41.0 ICD-9-CM: 780.09  12/5/2020 Yes Pneumomediastinum ([address] ICD-10-CM: J98.2 ICD-9-CM: 518.1  12/5/2020 No  
   
 Subcutaneous emphysema (HCC) ICD-10-CM: T79. 7XXA ICD-9-CM: 958.7  12/5/2020 No  
   
 Acute respiratory failure with hypoxia [facility]) ICD-10-CM: J96.01 
ICD-9-CM: 518.81  12/2/2020 Yes COVID-19 ICD-10-CM: U07.1 ICD-9-CM: 079.89  12/2/2020 Yes * (Principal) Acute hypoxemic respiratory failure due to COVID-19 [facility]) ICD-10-CM: U07.1, J96.01 
ICD-9-CM: 518.81, 079.89, 799.02  12/2/2020 Yes Acute respiratory distress syndrome (ARDS) due to COVID-19 virus [facility]) ICD-10-CM: U07.1, J80 
ICD-9-CM: 518.82, 079.89  12/2/2020 Yes  
   
  
 
 
 
  
Patient with severe acute hypoxic respiratory failure, intubated, with resultant B ptx s/p B chest tubes. Severe sub q air despite this intervention. Worsening O2 needs, up to 100% FiO2. Now with worsening renal failure. S/p HD catheter 12/11 and initiation of SLED 12/11. Plts have dropped to around 50% of starting point and now having duskiness of digits. HIT panel sent, changed to argatroban. HIT now negative. Plan:  (Medical Decision Making)  
- not proning as too unstable and too risky given his CT , on Flolan 
--stop heparin gtt due to bleeding 
- on pressors, increased needs while on CRRT 
--completed courses of dexa, remdesivir and convalescent plasma already.  
-completed course of rocephin/azithro 
- critically ill, spent 35 min His son Tracy Capone- [phone] More than 50% of the time documented was spent in face-to-face contact with the patient and in the care of the patient on the floor/unit where the patient is located.  
 
Pam Mix MD

## 2020-12-14 NOTE — PROGRESS NOTES
Ventilator check complete; patient has a #8. 0 ET tube secured at the 26 at the lip. Patient is sedated. Patient is not able to follow commands. Breath sounds are coarse and diminished. Trachea is midline, Positive for subcutaneous air, and chest excursion is symmetric. Patient is also Negative for cyanosis and is Negative for pitting edema. All alarms are set and audible. Resuscitation bag is at the head of the bed. Ventilator Settings Mode FIO2 Rate Tidal Volume PEEP I:E Ratio VC+  90 %   31 bpm 540 ml  10 cm H20  1:1.5 Peak airway pressure: 36 cm H2O Minute ventilation: 16.8 l/min Felipe Mujica, RT

## 2020-12-14 NOTE — PROGRESS NOTES
Comprehensive Nutrition Assessment [facility]) Nutrition Recommendations/Plan:  
? Continue Nepro @ 40 ml/hr, continue 3 packets ProSource TF daily, decrease water flush to 125 ml every 3 hours - 1848 calories/day (100% calorie goal), 111 gm protein/day (100% protein goal) in ~1700 ml water/day. Malnutrition Assessment: 
Malnutrition Status: At risk for malnutrition (specify)(NPO x 5 days. 9% weight loss within 3 months PTA.) Nutrition Assessment:  
Nutrition History: Unable to obtain nutrition history d/t isolation status. Nutrition Background: Pt admitted with acute respiratory failure secondary to covid 19. No significant PMH. It is noted that pt was undergoing workup for potential Parkinson's disease PTA. Pt intubated 12/7 and TF began 12/8. Daily Update: 
Remains ventilated, sedated, paralyzed and TF-dependent. RN reports good TF tolerance with low-moderate gastric residuals. Edema: 2+ generalized, 2+ all extremities Abdomen: semi-soft with active bowel sounds Last reported bowel movement: 12/14/2020 described as bloody. Labs: sodium 131, potassium 6.2, phosphorus 9, BUN 98, creatinine 4.41 Drips: Tracrium, Flolan, Fentanyl, Levophed, Diprivan Pertinent Meds: Reglan Nutrition Related Findings:  
NFPE deferred d/t isolation status. Current Nutrition Therapies: DIET NPO 
DIET TUBE FEEDING Provide 3 packets of prosource tube feeding daily at 0900. Flush with 50 ml free water after administration. Open order for details. Current Tube Feeding (TF) Orders: · Feeding Route: Orogastric · Formula: Nepro · Schedule:Continuous · Regimen: 40ml/hr · Additives/Modulars: Protein(3 packets prosource per day) · Water Flushes: 200 ml q 3 hours · Current TF & Flush Orders Provides: 8151 calories/day, 111 gm protein/day in 2296 ml water/day Current Intake: Additional Caloric Sources: Diprivan is contributing <100 calories/day at its current infusion rate. Anthropometric Measures: 
Height: 6' (182.9 cm) Current Body Wt: 98.8 kg (217 lb 13 oz)(12/14/2020), Weight source: Bed scale BMI: 29.5, Overweight (BMI 25.0-29. 9) Ideal Body Wt: 178 lbs (81 kg), 98.6 % Usual Body Wt: (222 lbs 10/2020 - potential 20 lb, 9% weight loss w) Estimated Daily Nutrient Needs: 
Energy (kcal): 0049-7283(20-25 kcal/d) (Kcal/kg, Weight Used: Current) Protein (g): 110-184 (1.2-2 gm/d ) Weight Used: (Current) Fluid (ml/day): 7746-5965 (Standard renal) Nutrition Diagnosis:  
· Inadequate oral intake related to impaired respiratory function as evidenced by (NPO, intubated) Nutrition Interventions:  
Food and/or Nutrient Delivery: Continue NPO, Modify tube feeding Coordination of Nutrition Care: Continue to monitor while inpatient Plan of Care discussed with Eleno Zheng RN. Goals:   
Meet >75% of estimated needs within 7 days. Nutrition Monitoring and Evaluation:  
  
Food/Nutrient Intake Outcomes: Enteral nutrition intake/tolerance Physical Signs/Symptoms Outcomes: Biochemical data, GI status, Hemodynamic status Discharge Planning: Too soon to determine Irvin Rogelio. Emma Kumar RD, LD on 12/14/2020 at 1:21 PM 
Contact: [phone]

## 2020-12-14 NOTE — PROGRESS NOTES
Ventilator check complete; patient has a #8. 0 ET tube secured at the 26 at the lip. Patient is not able to follow commands. Breath sounds are coarse. Trachea is midline, Positive for subcutaneous air, and chest excursion is symmetric. Patient is also Negative for cyanosis and is Negative for pitting edema. All alarms are set and audible. Resuscitation bag is at the head of the bed. Ventilator Settings Mode FIO2 Rate Tidal Volume Pressure PEEP I:E Ratio VC+  99 %   33 540 ml     10 cm H20  1:1.6 Peak airway pressure: 43 cm H2O Minute ventilation: 17.8 l/min ABG: No results for input(s): PH, PCO2, PO2, HCO3 in the last 72 hours.  
 
 
Elida Deondre Gould, RT

## 2020-12-14 NOTE — PROGRESS NOTES
Bedside and Verbal shift change report given to Braxton (oncoming nurse) by Orly Valles (offgoing nurse). Report included the following information SBAR, Kardex, ED Summary, Procedure Summary, Intake/Output, MAR, Recent Results, Med Rec Status and Cardiac Rhythm Sinus tach.

## 2020-12-14 NOTE — INTERDISCIPLINARY ROUNDS
Interdisciplinary team rounds were held 12/14/2020 with the following team members:Care Management, Nursing, Nurse Practitioner, Nutrition, Palliative Care, Pastoral Care, Pharmacy, Physician's Assistant, Respiratory Therapy and Clinical Coordinator and the patient. Plan of care discussed. See clinical pathway and/or care plan for interventions and desired outcomes.

## 2020-12-15 NOTE — PROGRESS NOTES
Called that patient coded ,CPR ,started, EPi, Bicarg,  epig tt amiodarone gtt started ,also glucose given . ALso patient shocked mulitiple times for VF. His pulse returned but then again lost and went to asystoly, CPR, resumed but unable to have return of pulse and then patient was pronounced dead at 12.19. Family on the way Jovana Yeung MD

## 2020-12-15 NOTE — PROGRESS NOTES
Approximately 0020, pt vent alarming severe occulusion \"Safety Valve\". Pt was bagged until RT could make changes/adjustments in vent for proper support. Pt O2 and BP did drop during this process, Dr. Brayden Snell was called to come assess pt, pt was maxed on Levo @ 30 and started on vaso at .01. pt is slow to recover but currently O2 is 85%, CXR ordered and reviewed by Dr. Brand Clubs decreased to 40 per verbal order. Morning labs all drawn early d/t recent events.

## 2020-12-15 NOTE — PROGRESS NOTES
Critical Care Daily Progress Note: 12/15/2020 Admission Date: 12/2/2020 The patient's chart is reviewed and the patient is discussed with the staff. 80 yo WM who presented with acute hypoxemia respiratory failure secondary to COVID-19. The patient arrived via EMS from home with reports of acute onset of dyspnea. He was recently in the ED on 11/27/2020 secondary to gait instability and subsequent fall. He is currently being worked up for Sooqini. Upon arrival to the ED, the patient's SPO2 levels were in the 50's on RA. He was placed on oxygen therapy with escalation to BiPAP. Reports no known COVID exposure. The patient has minimal medical history because he does not routinely see a PCP and only had has first office appointment on 10/8/2020 with Dr. Gagandeep Martin due to neurological changes. He is a previous smoker but quit in 2007. The patient's son is the primary historian. The family wasn't aware that the patient could have COVID. The son suggested that the patient was having some dyspnea x 1 week. Also suggested he has been bedridden for nearly a week due to neurological changes. The rapid COVID-19 test was completed in the ED and was positive. Lactic acid level is 1.3, procal 0.28, WBC 7.3  
Intubated 12/8/20 Bilateral 28F chest tubes 12/8/20  
S/p 5 days remdesivir, last dose 12/7. S/p convalescent plasma 12/3. Subjective:  
 
Overnight events reviewed -patient Sat dropped to 40 % ,when apparently vent oclusion occurred on vent, patient also mally down and took a while for his Sat to come up to mid 80's ABG this AM- 7.11/86/61 Sedated on vent On CRRT- not today On  3 pressors Sat in mid [de-identified] 's  
P/F ratio    139 >>>> 63 >>>> 61  today Current Facility-Administered Medications Medication Dose Route Frequency  vasopressin (VASOSTRICT) 20 Units in 0.9% sodium chloride 100 mL infusion  0-0.04 Units/min IntraVENous TITRATE  0.9% sodium chloride infusion 250 mL  250 mL IntraVENous PRN  
 NOREPINephrine (LEVOPHED) 16,000 mcg in dextrose 5% 250 mL infusion  0.5-16 mcg/min IntraVENous TITRATE  PHENYLephrine (BERNABE-SYNEPHRINE) 30 mg in 0.9% sodium chloride 250 mL infusion   mcg/min IntraVENous TITRATE  
 0.9% sodium chloride infusion 250 mL  250 mL IntraVENous PRN  
 albumin human 25% (BUMINATE) solution 25 g  25 g IntraVENous Q6H  
 pantoprazole (PROTONIX) 40 mg in 0.9% sodium chloride 10 mL injection  40 mg IntraVENous Q12H  
 epoprostenol (FLOLAN) 30,000 ng/mL in diluent for epoprostenol (gly) 50 mL solution  10-50 ng/kg/min (Ideal) Nebulization TITRATE  
 0.9% Sodium Chloride for Flolan Infusion  0-8 mL/hr Nebulization TITRATE  heparin 25,000 units in dextrose 500 mL infusion  12-25 Units/kg/hr IntraVENous TITRATE  docusate (COLACE) 50 mg/5 mL oral liquid 100 mg  100 mg Per NG tube DAILY  white petrolatum-mineral oiL (AKWA TEARS) 83-15 % ophthalmic ointment   Both Eyes Q4H PRN  
 atracurium (TRACRIUM) 1,000 mg in 0.9% sodium chloride 250 mL infusion  0-15 mcg/kg/min IntraVENous TITRATE  polyethylene glycol (MIRALAX) packet 17 g  17 g Per NG tube DAILY  metoclopramide HCl (REGLAN) injection 10 mg  10 mg IntraVENous Q6H  
 NUTRITIONAL SUPPORT ELECTROLYTE PRN ORDERS   Does Not Apply PRN  
 fentaNYL in normal saline (pf) 25 mcg/mL infusion  0-200 mcg/hr IntraVENous TITRATE  propofol (DIPRIVAN) 10 mg/mL infusion  0-50 mcg/kg/min IntraVENous TITRATE  acetaminophen (TYLENOL) tablet 650 mg  650 mg Per NG tube Q4H PRN  
 insulin lispro (HUMALOG) injection   SubCUTAneous Q6H  
 morphine 10 mg/ml injection 5 mg  5 mg IntraVENous Q4H PRN  
 influenza vaccine 2020-21 (6 mos+)(PF) (FLUARIX/FLULAVAL/FLUZONE QUAD) injection 0.5 mL  0.5 mL IntraMUSCular PRIOR TO DISCHARGE  dexmedeTOMidine (PRECEDEX) 400 mcg in 0.9% sodium chloride 100 mL infusion  0.1-1.5 mcg/kg/hr IntraVENous TITRATE  sodium chloride (NS) flush 5-40 mL  5-40 mL IntraVENous Q8H  
 sodium chloride (NS) flush 5-40 mL  5-40 mL IntraVENous PRN  
 0.9% sodium chloride infusion 250 mL  250 mL IntraVENous PRN Review of Systems Constitutional:  negative for fever, chills, sweats Cardiovascular:  negative for chest pain, palpitations, syncope, edema Gastrointestinal:  negative for dysphagia, reflux, vomiting, diarrhea, abdominal pain, or melena Neurologic:  negative for focal weakness, numbness, headache Objective:  
 
Vitals:  
 12/15/20 0846 12/15/20 0900 12/15/20 0915 12/15/20 0931 BP: (!) 93/51 (!) 97/52 (!) 102/53 (!) 96/51 Pulse: (!) 115 (!) 115 (!) 115 (!) 114 Resp: (!) 32 (!) 32 (!) 32 (!) 32 Temp:      
SpO2: (!) 88% (!) 87% (!) 88% (!) 88% Weight:      
Height:      
 
 
 
Intake/Output Summary (Last 24 hours) at 12/15/2020 1036 Last data filed at 12/15/2020 0900 Gross per 24 hour Intake 4535.28 ml Output 2441 ml Net 2094.28 ml Physical Exam:         
Constitutional:  intubated and mechanically ventilated. EENMT:  Sclera clear, pupils equal, oral mucosa moist 
Respiratory: coarse ,lot of subcutaneous air Cardiovascular:  RRR with no M,G,R; 
Gastrointestinal:  soft with no tenderness; positive bowel sounds present Musculoskeletal:  warm with no cyanosis, plus 1  lower extremity edema Skin:  no jaundice or ecchymosis Neurologic: no gross neuro deficits Psychiatric:  Sedated, paralyzed LINES:   
ETT- ( 12/8-  
CT behzad ( 12/8-  
 L arm PICC 
 HD catheter R femoral  Vein DRIPS:   
Propofol gtt - 5  
- Fentanyl gtt-25  
- Flolan  
 -tractrium gtt - Levophed gtt- 30 
- Vasopresin gt 0.04 Prem gtt 75 CXR:   
 
 
Ventilator Settings Mode FIO2 Rate Tidal Volume Pressure PEEP Pressure control  98 %    480 ml     14 cm H20 Peak airway pressure: 50 cm H2O Minute ventilation: 17.8 l/min ABG:  
Recent Labs 12/15/20 
0408 12/15/20 
0202 12/15/20 
0144 PHI 7.11* 7.11* 7.11* PCO2I 86.5* 91.4* 94.3*  
PO2I 61* 63* 62* HCO3I 27.3* 29.2* 29.6* LAB Recent Labs 12/15/20 
7343 12/15/20 
0016 12/14/20 
1708 12/14/20 
1111 12/14/20 
2980 GLUCPOC 108* 92 76 106* 111* Recent Labs 12/15/20 
0104 12/14/20 2010 12/14/20 
1220 12/14/20 
0415 12/13/20 
9603 WBC 14.4*  --   --  12.5* 17.3* HGB 7.4* 7.1* 8.1* 8.7* 10.6* HCT 24.3* 22.5* 26.4* 27.2* 33.6*  
*  --   --  120* 84* Recent Labs 12/15/20 
0104 12/14/20 
0415 12/13/20 
8076  131* 137  
K 4.8 6.2* 5.5*  
 97* 103 CO2 31 23 29 GLU 90 107* 118* BUN 36* 98* 64* CREA 2.23* 4.41* 2.73* MG  --  3.1*  --   
PHOS 7.1* >9.0*  --   
CA 7.7* 7.8* 8.3 ALB 2.0* 1.3*  -- No results for input(s): LCAD, LAC in the last 72 hours. Patient Active Problem List  
Diagnosis Code  Acute respiratory failure with hypoxia ([facility]) J96.01  
 COVID-19 U07.1  Acute hypoxemic respiratory failure due to COVID-19 ([facility]) U07.1, J96.01  
 Acute respiratory distress syndrome (ARDS) due to COVID-19 virus ([facility]) U07.1, J80  Delirium R41.0  Pneumomediastinum ([address] J98.2  Subcutaneous emphysema ([facility]) T79. 7XXA  Hypernatremia E87.0  Pneumothorax on left J93.9  Pneumothorax on right J93.9  CAREN (acute kidney injury) ([address] N17.9  Thrombocytopenia ([address] D69.6 Patient with severe acute hypoxic respiratory failure, intubated, with resultant B ptx s/p B chest tubes. Severe sub q air despite this intervention. Worsening O2 needs, up to 100% FiO2. Now with worsening renal failure. S/p HD catheter 12/11 and initiation of SLED 12/11. Plts have dropped to around 50% of starting point and now having duskiness of digits. HIT panel sent, changed to argatroban. HIT now negative. Assessment:  (Medical Decision Making) Hospital Problems  Date Reviewed: 10/9/2020 Codes Class Noted POA CAREN (acute kidney injury) ([facility] ICD-10-CM: N17.9 ICD-9-CM: 584.9  12/10/2020 Unknown Thrombocytopenia ([facility] ICD-10-CM: D69.6 ICD-9-CM: 287.5  12/10/2020 Unknown Pneumothorax on left ICD-10-CM: J93.9 ICD-9-CM: 512.89  12/7/2020 Unknown Pneumothorax on right ICD-10-CM: J93.9 ICD-9-CM: 512.89  12/7/2020 Unknown Hypernatremia ICD-10-CM: E87.0 ICD-9-CM: 276.0  12/6/2020 Unknown Delirium ICD-10-CM: R41.0 ICD-9-CM: 780.09  12/5/2020 Yes Pneumomediastinum ([facility] ICD-10-CM: J98.2 ICD-9-CM: 518.1  12/5/2020 No  
   
 Subcutaneous emphysema (HCC) ICD-10-CM: T79. 7XXA ICD-9-CM: 958.7  12/5/2020 No  
   
 Acute respiratory failure with hypoxia [facility]) ICD-10-CM: J96.01 
ICD-9-CM: 518.81  12/2/2020 Yes COVID-19 ICD-10-CM: U07.1 ICD-9-CM: 079.89  12/2/2020 Yes * (Principal) Acute hypoxemic respiratory failure due to COVID-19 [facility]) ICD-10-CM: U07.1, J96.01 
ICD-9-CM: 518.81, 079.89, 799.02  12/2/2020 Yes Acute respiratory distress syndrome (ARDS) due to COVID-19 virus [facility]) ICD-10-CM: U07.1, J80 
ICD-9-CM: 518.82, 079.89  12/2/2020 Yes  
   
  
 
 
 
  
Patient with severe acute hypoxic respiratory failure, intubated, with resultant B ptx s/p B chest tubes. Severe sub q air despite this intervention. Worsening O2 needs, up to 100% FiO2. Now with worsening renal failure. S/p HD catheter 12/11 and initiation of SLED 12/11. Plts have dropped to around 50% of starting point and now having duskiness of digits. HIT panel sent, changed to argatroban. HIT now negative. Plan:  (Medical Decision Making)  
- not proning as too unstable and too risky given his CT , on Flolan- on 100 % for days, worsening P/F ratio, now also worsening respiratory acidosis 
- on multiple  pressors, increased needs while on CRRT 
--completed courses of dexa, remdesivir and convalescent plasma already.  
-completed course of rocephin/azithro 
- monitor subcutaneous air, has 2 CT and small apical PTX for days ,not worse - critically ill, spent 60 min patient care, I have talked to his son ,also his mother and they wish to pursue to transfer patient to [facility] for  ECMO, I have expressed concerned about his stability to be able to be moved but they asked me to called the hospital and talked to doctor and see if they would decide to accept him His son Jorje Babcock- [phone] More than 50% of the time documented was spent in face-to-face contact with the patient and in the care of the patient on the floor/unit where the patient is located.  
 
Nakul Saenz MD

## 2020-12-15 NOTE — PROGRESS NOTES
Patient  after receiving several rounds of CPR.  provided comfort, a spiritual presence, emotional support and prayer. Nava Dial MDIV

## 2020-12-15 NOTE — PROGRESS NOTES
Pt finished CRRT w/o issueSpoke to Dr. Alejandra Ferrari about Hgb 7.1, O2 85% while on 100% FiO2. Per Dr. Alejandra Ferrari change H/H to q12 and draw with morning labs d/c pepcid and add protonix 40mg IV BID to start at 0600 and RT may make adjusts to help with Oxygen but keep PEEP at 10. Orders placed notified RT.

## 2020-12-15 NOTE — PROGRESS NOTES
11:36: HR 76, no blood pressure reading, Prem maxed at 300, pads on pt, zoll at bedside, MD called 11:53: Pt asystole, cpr started, MD at bedside. CODE BLUE called. 11:54: Epi given 11:54: Blood at 999ml/hr per MD, fent, prop, and trac stopped 11:55: Bicarb admin 11:56: Calcium admin 11:56 Pulse check, pulse present, vtach on monitor. 11:57: Pt shocked, 200J, vtach with pulse 11:58: Pt shocked, 200J, vtach with pulse, 150 amio admin, NSR 70's 11:59: Bicarb admin, amio gtt at 1 
1207: Bradycardic rate 58, bs 22, dextrose amp admin, bicarb admin 12:14: Asystole, Epi admin, Epi gtt started at 10 
12:15: Bicarb admin 12:16: pulse check, cpr resumed 12:17: Bs 21, dextrose amp admin 12:19: pulse check, asystole. Efforts stopped per Dr. Omer LOZADA 12:19 Family, , and house supervisor contacted.

## 2020-12-15 NOTE — PROGRESS NOTES
Spoke to Dr. Alejo Vazquez about ABG and morning Labs, orders to increase RR on vent to 32. Notified RT.

## 2020-12-15 NOTE — PROGRESS NOTES
Ventilator check complete; patient has a #8. 0 ET tube secured at the 26 at the lip. Patient is sedated. Patient is not able to follow commands. Breath sounds are coarse and diminished. Trachea is midline, Positive for subcutaneous air, and chest excursion is symmetric. Patient is also Positive for cyanosis and is Negative for pitting edema. All alarms are set and audible. Resuscitation bag is at the head of the bed. Ventilator Settings Mode FIO2 Rate Tidal Volume Pressure PEEP I:E Ratio VC+  99 %   35 480 ml     10 cm H20  1:1.4 Peak airway pressure: 43 cm H2O Minute ventilation: 16.9 l/min ABG:  
 
 
Grisel Waldron, RT

## 2020-12-15 NOTE — PROGRESS NOTES
Care Management Interventions PCP Verified by CM: Venancio Garcia MD) Mode of Transport at Discharge: Other (see comment) Transition of Care Consult (CM Consult): Discharge Planning Current Support Network: Own Home, Lives with Spouse Confirm Follow Up Transport: Family The Patient and/or Patient Representative was Provided with a Choice of Provider and Agrees with the Discharge Plan?: No 
Freedom of Choice List was Provided with Basic Dialogue that Supports the Patient's Individualized Plan of Care/Goals, Treatment Preferences and Shares the Quality Data Associated with the Providers?: No 
[facility] Resource Information Provided?: No 
Discharge Location Discharge Placement:

## 2020-12-15 NOTE — PROGRESS NOTES
Ventilator check complete; patient has a #8. 0 ET tube secured at the 26 at the lip. Patient is not able to follow commands. Breath sounds are coarse. Trachea is midline, Positive for subcutaneous air, and chest excursion is symmetric. Patient is also Negative for cyanosis and is Negative for pitting edema. All alarms are set and audible. Resuscitation bag is at the head of the bed. Ventilator Settings Mode FIO2 Rate Tidal Volume Pressure PEEP I:E Ratio Pressure control  98 %   32    20 14 cm H20  1:1.4 Peak airway pressure: 50 cm H2O Minute ventilation: 17.8 l/min ABG: No results for input(s): PH, PCO2, PO2, HCO3 in the last 72 hours.  
 
 
Tania Gould, RT

## 2020-12-15 NOTE — PROGRESS NOTES
Bedside, Verbal and Written shift change report given to Lakesha Greenfield RN (oncoming nurse) by Judy Infante RN (offgoing nurse). Report included the following information SBAR, Kardex, Intake/Output, MAR, Cardiac Rhythm st and Alarm Parameters . Fent and flolan gtts signed off at bedside.

## 2020-12-15 NOTE — PROGRESS NOTES
[facility] Nephrology Subjective: CAREN   Intubated, sedated. Not directly seen. Seen through door d/t COVID 19 and preservation of PPE. Discussed with staff. Review of Systems - Can not be obtained. Objective: 
 
Vitals:  
 12/15/20 0846 12/15/20 0900 12/15/20 0915 12/15/20 0931 BP: (!) 93/51 (!) 97/52 (!) 102/53 (!) 96/51 Pulse: (!) 115 (!) 115 (!) 115 (!) 114 Resp: (!) 32 (!) 32 (!) 32 (!) 32 Temp:      
SpO2: (!) 88% (!) 87% (!) 88% (!) 88% Weight:      
Height:      
 
 
PE from Dr Nica Aldana Constitution:  the patient is ill appearing. EENMT:  Sclera clear, pupils equal, oral mucosa moist 
Respiratory: Audible sub q air diffusely. B chest tubes. Intermittent L air leak, no R air leak. Cardiovascular:  RRR without M,G,R 
Gastrointestinal: soft and non-tender; with positive bowel sounds. Musculoskeletal: warm without cyanosis. There is no lower leg edema. Skin:  no jaundice or rashes, no wounds. R middle finger dusky. 4th toe on left foot mildly dusky. Neurologic: sedated Psychiatric:  sedated [facility] [place] LAB Recent Labs 12/15/20 
0104 12/14/20 
2010 12/14/20 
1220 12/14/20 0415 12/13/20 
7755 WBC 14.4*  --   --  12.5* 17.3* HGB 7.4* 7.1* 8.1* 8.7* 10.6* HCT 24.3* 22.5* 26.4* 27.2* 33.6*  
*  --   --  120* 84* Recent Labs 12/15/20 
0104 12/14/20 
0415 12/13/20 
6265  131* 137  
K 4.8 6.2* 5.5*  
 97* 103 CO2 31 23 29 GLU 90 107* 118* BUN 36* 98* 64* CREA 2.23* 4.41* 2.73* MG  --  3.1*  --   
PHOS 7.1* >9.0*  --   
CA 7.7* 7.8* 8.3 ALB 2.0* 1.3*  --   
 
 
 
 
Radiology A/P:  
Patient Active Problem List  
Diagnosis Code  Acute respiratory failure with hypoxia (HCC) J96.01  
 COVID-19 U07.1  Acute hypoxemic respiratory failure due to COVID-19 (HCC) U07.1, J96.01  
 Acute respiratory distress syndrome (ARDS) due to COVID-19 virus (HCC) U07.1, J80  Delirium R41.0  Pneumomediastinum ([address] J98.2  Subcutaneous emphysema (HCC) T79. 7XXA  Hypernatremia E87.0  Pneumothorax on left J93.9  Pneumothorax on right J93.9  CAREN (acute kidney injury) ([address] N17.9  Thrombocytopenia ([address] D69.6 CAREN - Will hold dialysis today and plan SED tomorrow. Resp Failure - on vent COVID 19 AMS Cindy Gotti MD

## 2020-12-15 NOTE — PROGRESS NOTES
I have talked to Dr. Perri Hui from [facility], at [facility] and discussed the case with him and he declined to accept the patient as not a candidate for ECMO .  
 
Jamir Canales MD

## 2020-12-16 LAB
ABO + RH BLD: NORMAL
BLD PROD TYP BPU: NORMAL
BLOOD GROUP ANTIBODIES SERPL: NORMAL
BPU ID: NORMAL
CROSSMATCH RESULT,%XM: NORMAL
SPECIMEN EXP DATE BLD: NORMAL
STATUS OF UNIT,%ST: NORMAL
UNIT DIVISION, %UDIV: 0

## 2020-12-22 NOTE — PROGRESS NOTES
Physician Progress Note PATIENT:               Katheryn Valdes 
CSN #:                  [id number] :                       1950 ADMIT DATE:       2020 1:41 PM 
Cami Plascencia DATE:        12/15/2020 12:19 PM 
RESPONDING 
PROVIDER #:        Darvin HOUSE MD 
 
 
 
 
QUERY TEXT: 
 
Pt admitted with COVID. Pt noted to have increased WBC 18.1, tachycardia. If possible, please document in the progress notes and discharge summary if you are evaluating and /or treating any of the following: The medical record reflects the following: 
Risk Factors: + COVID Clinical Indicators: WBC 18.1 on , WBC 17.3 on , WBC 12.5 on , temp 101.6 on 12/15, tachycardia, UA +2 bacteria Treatment: IV Zithromax, IV Rocephin,  IVF @ 125ml/hr x 3days, Options provided: 
-- Sepsis, not present on admission 
-- Sepsis was ruled out 
-- Other - I will add my own diagnosis -- Disagree - Not applicable / Not valid -- Disagree - Clinically unable to determine / Unknown 
-- Refer to Clinical Documentation Reviewer PROVIDER RESPONSE TEXT: 
 
This patient has sepsis that was not present on admission. Query created by: Petar Garcia on 2020 10:49 AM 
 
 
QUERY TEXT: 
 
Pt admitted with COVID and noted to have bilateral infiltrates on cxr. Please document in progress notes and discharge summary if you are evaluating or treating any of the following: The medical record reflects the following: 
Risk Factors: 79 yr, + COVID Clinical Indicators: CXR on  showed Unchanged bilateral lung infiltrates, tachypnea, tachycardia, 
Treatment: IV Zithromax, IV Rocephin, IV cefepime, bipap, intubation, ICU Options provided: 
-- Acute bronchitis due to COVID-19 
-- Acute lower respiratory infection due to COVID-19 
-- ARDS due to COVID-19 
-- Pneumonia due to COVID-19 
-- Other - I will add my own diagnosis -- Disagree - Not applicable / Not valid -- Disagree - Clinically unable to determine / Unknown 
-- Refer to Clinical Documentation Reviewer PROVIDER RESPONSE TEXT: 
 
This patient has pneumonia due to COVID-19. Query created by: Samir Rojas on 12/16/2020 11:05 AM 
 
 
QUERY TEXT: 
 
Pt admitted with COVID. Pt noted to have ***. If possible, please document in the progress notes and discharge summary if you are evaluating and/or treating any of the following: The medical record reflects the following: 
Risk Factors: + COVID Clinical Indicators:  blood pressure 82/46 map of 58, 77/48 map of 57, per documentation Duskiness noted on R middle finger and L toes Treatment: Vasopressin infusion, stacy-synephrine infusion, Levophed infusion, Options provided: 
-- Cardiogenic Shock 
-- Septic Shock -- Hypovolemic Shock 
-- Other - I will add my own diagnosis -- Disagree - Not applicable / Not valid -- Disagree - Clinically unable to determine / Unknown 
-- Refer to Clinical Documentation Reviewer PROVIDER RESPONSE TEXT: 
 
This patient has Septic Shock.  
 
Query created by: Samir Rojas on 12/16/2020 11:29 AM 
 
 
Electronically signed by:  Adeola Sofia MD 12/22/2020 2:32 PM

## 2020-12-22 NOTE — DEATH NOTE
Death Summary [facility] Admission date:  12/2/2020 Discharge date:  12/15/2020 Admitting Diagnosis:  Acute hypoxemic respiratory failure due to COVID-19 ([facility] [U07.1, J96.01] Discharge Diagnosis:   
Problem List as of 12/15/2020 Date Reviewed: 10/9/2020 Codes Class Noted - Resolved CAREN (acute kidney injury) ([facility] ICD-10-CM: N17.9 ICD-9-CM: 584.9  12/10/2020 - Present Thrombocytopenia ([facility] ICD-10-CM: D69.6 ICD-9-CM: 287.5  12/10/2020 - Present Pneumothorax on left ICD-10-CM: J93.9 ICD-9-CM: 512.89  12/7/2020 - Present Pneumothorax on right ICD-10-CM: J93.9 ICD-9-CM: 512.89  12/7/2020 - Present Hypernatremia ICD-10-CM: E87.0 ICD-9-CM: 276.0  12/6/2020 - Present Delirium ICD-10-CM: R41.0 ICD-9-CM: 780.09  12/5/2020 - Present Pneumomediastinum ([facility] ICD-10-CM: J98.2 ICD-9-CM: 518.1  12/5/2020 - Present Subcutaneous emphysema ([facility] ICD-10-CM: T79. 7XXA ICD-9-CM: 958.7  12/5/2020 - Present Acute respiratory failure with hypoxia [facility]) ICD-10-CM: J96.01 
ICD-9-CM: 518.81  12/2/2020 - Present COVID-19 ICD-10-CM: U07.1 ICD-9-CM: 079.89  12/2/2020 - Present * (Principal) Acute hypoxemic respiratory failure due to COVID-19 [facility]) ICD-10-CM: U07.1, J96.01 
ICD-9-CM: 518.81, 079.89, 799.02  12/2/2020 - Present Acute respiratory distress syndrome (ARDS) due to COVID-19 virus [facility]) ICD-10-CM: U07.1, J80 
ICD-9-CM: 518.82, 079.89  12/2/2020 - Present Consultants:nephrology ID Studies/Procedures: intubation R radial artery catheter R  CT  
L CT 
 
 Hospital course:Patient is a 79 y.o.  male presents with acute hypoxemia respiratory  failure secondary to COVID-19. The patient arrived via EMS from home with reports on acute onset of dyspnea. He was recently in the ED on 11/27/2020 secondary to gait instability and subsequent fall. He is currently being worked up for [facility]. Upon arrival to the Ed, the patient's SPO2 levels were in the 50's on RA. He was placed on oxygent therapy with escalation to BiPAP. Reports no known COVID exposure. The patient has minimal medical history because he does not routinely see a PCP and only had has first office appointment on 10/8/2020 with Dr. Angela Longo due to neurological changes. He is a previous smoker but quit in 2007. The patient's son is the primary historian. The family wasn't aware that the patient could have COVID. The son suggested that the patient was having some dyspnea x 1 week. Also suggested he has been bedridden for nearly a week due to neurological changes. The rapid COVID-19 test was completed in the ED and was positive. Patient was admitted to COVID-19 unit,initially on BIPAP, received convalescent plasma, decadron ,remdesivir and Zithromax, Rocephin . He decompensated on was intubated on 12/7. Also developed behzad PTX and had behzad CT placed. He developed renal failure and had to be placed on HD. He wad paralyzed. He became hypotensive and was eventually placed on 3 pressors. Was also placed on FLolan, He continued to deteriorated and on 12/15 he coded multiple times and eventually was pronounced dead at 12.19 Final: 
--Pronounced dead at 12.19 . 
--Total discharge greater than 30 minutes in duration.  
 
Pam Mix MD